# Patient Record
Sex: FEMALE | Race: WHITE | URBAN - METROPOLITAN AREA
[De-identification: names, ages, dates, MRNs, and addresses within clinical notes are randomized per-mention and may not be internally consistent; named-entity substitution may affect disease eponyms.]

---

## 2021-04-27 ENCOUNTER — INPATIENT (INPATIENT)
Facility: HOSPITAL | Age: 87
LOS: 5 days | Discharge: HOME HEALTH | DRG: 175 | End: 2021-05-03
Attending: HOSPITALIST | Admitting: FAMILY MEDICINE
Payer: MEDICARE

## 2021-04-27 VITALS
HEART RATE: 91 BPM | TEMPERATURE: 97 F | DIASTOLIC BLOOD PRESSURE: 62 MMHG | WEIGHT: 138.89 LBS | SYSTOLIC BLOOD PRESSURE: 113 MMHG | HEIGHT: 61 IN | RESPIRATION RATE: 18 BRPM | OXYGEN SATURATION: 91 %

## 2021-04-27 DIAGNOSIS — I26.99 OTHER PULMONARY EMBOLISM WITHOUT ACUTE COR PULMONALE: ICD-10-CM

## 2021-04-27 DIAGNOSIS — R77.8 OTHER SPECIFIED ABNORMALITIES OF PLASMA PROTEINS: ICD-10-CM

## 2021-04-27 LAB
ALBUMIN SERPL ELPH-MCNC: 3 G/DL — LOW (ref 3.3–5)
ALP SERPL-CCNC: 57 U/L — SIGNIFICANT CHANGE UP (ref 40–120)
ALT FLD-CCNC: 16 U/L — SIGNIFICANT CHANGE UP (ref 12–78)
ANION GAP SERPL CALC-SCNC: 8 MMOL/L — SIGNIFICANT CHANGE UP (ref 5–17)
APPEARANCE UR: CLEAR — SIGNIFICANT CHANGE UP
APTT BLD: 22.7 SEC — LOW (ref 27.5–35.5)
APTT BLD: >200 SEC — CRITICAL HIGH (ref 27.5–35.5)
AST SERPL-CCNC: 23 U/L — SIGNIFICANT CHANGE UP (ref 15–37)
BASOPHILS # BLD AUTO: 0.04 K/UL — SIGNIFICANT CHANGE UP (ref 0–0.2)
BASOPHILS NFR BLD AUTO: 0.4 % — SIGNIFICANT CHANGE UP (ref 0–2)
BILIRUB SERPL-MCNC: 0.4 MG/DL — SIGNIFICANT CHANGE UP (ref 0.2–1.2)
BILIRUB UR-MCNC: NEGATIVE — SIGNIFICANT CHANGE UP
BUN SERPL-MCNC: 31 MG/DL — HIGH (ref 7–23)
CALCIUM SERPL-MCNC: 10.1 MG/DL — SIGNIFICANT CHANGE UP (ref 8.5–10.1)
CHLORIDE SERPL-SCNC: 107 MMOL/L — SIGNIFICANT CHANGE UP (ref 96–108)
CO2 SERPL-SCNC: 27 MMOL/L — SIGNIFICANT CHANGE UP (ref 22–31)
COLOR SPEC: YELLOW — SIGNIFICANT CHANGE UP
CREAT SERPL-MCNC: 1.45 MG/DL — HIGH (ref 0.5–1.3)
DIFF PNL FLD: ABNORMAL
EOSINOPHIL # BLD AUTO: 0.13 K/UL — SIGNIFICANT CHANGE UP (ref 0–0.5)
EOSINOPHIL NFR BLD AUTO: 1.4 % — SIGNIFICANT CHANGE UP (ref 0–6)
GLUCOSE SERPL-MCNC: 105 MG/DL — HIGH (ref 70–99)
GLUCOSE UR QL: NEGATIVE MG/DL — SIGNIFICANT CHANGE UP
HCT VFR BLD CALC: 38.2 % — SIGNIFICANT CHANGE UP (ref 34.5–45)
HCT VFR BLD CALC: 39.6 % — SIGNIFICANT CHANGE UP (ref 34.5–45)
HGB BLD-MCNC: 12.5 G/DL — SIGNIFICANT CHANGE UP (ref 11.5–15.5)
HGB BLD-MCNC: 13 G/DL — SIGNIFICANT CHANGE UP (ref 11.5–15.5)
IMM GRANULOCYTES NFR BLD AUTO: 0.4 % — SIGNIFICANT CHANGE UP (ref 0–1.5)
INR BLD: 1.11 RATIO — SIGNIFICANT CHANGE UP (ref 0.88–1.16)
KETONES UR-MCNC: NEGATIVE — SIGNIFICANT CHANGE UP
LEUKOCYTE ESTERASE UR-ACNC: NEGATIVE — SIGNIFICANT CHANGE UP
LYMPHOCYTES # BLD AUTO: 3.49 K/UL — HIGH (ref 1–3.3)
LYMPHOCYTES # BLD AUTO: 37.6 % — SIGNIFICANT CHANGE UP (ref 13–44)
MAGNESIUM SERPL-MCNC: 1.8 MG/DL — SIGNIFICANT CHANGE UP (ref 1.6–2.6)
MCHC RBC-ENTMCNC: 32.5 PG — SIGNIFICANT CHANGE UP (ref 27–34)
MCHC RBC-ENTMCNC: 32.6 PG — SIGNIFICANT CHANGE UP (ref 27–34)
MCHC RBC-ENTMCNC: 32.7 GM/DL — SIGNIFICANT CHANGE UP (ref 32–36)
MCHC RBC-ENTMCNC: 32.8 GM/DL — SIGNIFICANT CHANGE UP (ref 32–36)
MCV RBC AUTO: 99 FL — SIGNIFICANT CHANGE UP (ref 80–100)
MCV RBC AUTO: 99.7 FL — SIGNIFICANT CHANGE UP (ref 80–100)
MONOCYTES # BLD AUTO: 0.72 K/UL — SIGNIFICANT CHANGE UP (ref 0–0.9)
MONOCYTES NFR BLD AUTO: 7.8 % — SIGNIFICANT CHANGE UP (ref 2–14)
NEUTROPHILS # BLD AUTO: 4.87 K/UL — SIGNIFICANT CHANGE UP (ref 1.8–7.4)
NEUTROPHILS NFR BLD AUTO: 52.4 % — SIGNIFICANT CHANGE UP (ref 43–77)
NITRITE UR-MCNC: NEGATIVE — SIGNIFICANT CHANGE UP
PH UR: 5 — SIGNIFICANT CHANGE UP (ref 5–8)
PLATELET # BLD AUTO: 124 K/UL — LOW (ref 150–400)
PLATELET # BLD AUTO: 127 K/UL — LOW (ref 150–400)
POTASSIUM SERPL-MCNC: 4.1 MMOL/L — SIGNIFICANT CHANGE UP (ref 3.5–5.3)
POTASSIUM SERPL-SCNC: 4.1 MMOL/L — SIGNIFICANT CHANGE UP (ref 3.5–5.3)
PROT SERPL-MCNC: 6.1 GM/DL — SIGNIFICANT CHANGE UP (ref 6–8.3)
PROT UR-MCNC: NEGATIVE MG/DL — SIGNIFICANT CHANGE UP
PROTHROM AB SERPL-ACNC: 12.9 SEC — SIGNIFICANT CHANGE UP (ref 10.6–13.6)
RAPID RVP RESULT: SIGNIFICANT CHANGE UP
RBC # BLD: 3.83 M/UL — SIGNIFICANT CHANGE UP (ref 3.8–5.2)
RBC # BLD: 4 M/UL — SIGNIFICANT CHANGE UP (ref 3.8–5.2)
RBC # FLD: 12.9 % — SIGNIFICANT CHANGE UP (ref 10.3–14.5)
RBC # FLD: 12.9 % — SIGNIFICANT CHANGE UP (ref 10.3–14.5)
SARS-COV-2 RNA SPEC QL NAA+PROBE: SIGNIFICANT CHANGE UP
SODIUM SERPL-SCNC: 142 MMOL/L — SIGNIFICANT CHANGE UP (ref 135–145)
SP GR SPEC: 1.01 — SIGNIFICANT CHANGE UP (ref 1.01–1.02)
TROPONIN I SERPL-MCNC: 0.13 NG/ML — HIGH (ref 0.01–0.04)
TROPONIN I SERPL-MCNC: 0.98 NG/ML — HIGH (ref 0.01–0.04)
TROPONIN I SERPL-MCNC: 1.06 NG/ML — HIGH (ref 0.01–0.04)
TROPONIN I SERPL-MCNC: 1.22 NG/ML — HIGH (ref 0.01–0.04)
TSH SERPL-MCNC: 4.32 UU/ML — SIGNIFICANT CHANGE UP (ref 0.34–4.82)
UROBILINOGEN FLD QL: NEGATIVE MG/DL — SIGNIFICANT CHANGE UP
WBC # BLD: 12.56 K/UL — HIGH (ref 3.8–10.5)
WBC # BLD: 9.29 K/UL — SIGNIFICANT CHANGE UP (ref 3.8–10.5)
WBC # FLD AUTO: 12.56 K/UL — HIGH (ref 3.8–10.5)
WBC # FLD AUTO: 9.29 K/UL — SIGNIFICANT CHANGE UP (ref 3.8–10.5)

## 2021-04-27 PROCEDURE — 99223 1ST HOSP IP/OBS HIGH 75: CPT

## 2021-04-27 PROCEDURE — 86140 C-REACTIVE PROTEIN: CPT

## 2021-04-27 PROCEDURE — 85652 RBC SED RATE AUTOMATED: CPT

## 2021-04-27 PROCEDURE — 99291 CRITICAL CARE FIRST HOUR: CPT | Mod: CS

## 2021-04-27 PROCEDURE — 85027 COMPLETE CBC AUTOMATED: CPT

## 2021-04-27 PROCEDURE — 85730 THROMBOPLASTIN TIME PARTIAL: CPT

## 2021-04-27 PROCEDURE — 97163 PT EVAL HIGH COMPLEX 45 MIN: CPT | Mod: GP

## 2021-04-27 PROCEDURE — 97110 THERAPEUTIC EXERCISES: CPT | Mod: GP

## 2021-04-27 PROCEDURE — 84484 ASSAY OF TROPONIN QUANT: CPT

## 2021-04-27 PROCEDURE — U0005: CPT

## 2021-04-27 PROCEDURE — 93970 EXTREMITY STUDY: CPT

## 2021-04-27 PROCEDURE — 85025 COMPLETE CBC W/AUTO DIFF WBC: CPT

## 2021-04-27 PROCEDURE — 73560 X-RAY EXAM OF KNEE 1 OR 2: CPT | Mod: LT

## 2021-04-27 PROCEDURE — 97116 GAIT TRAINING THERAPY: CPT | Mod: GP

## 2021-04-27 PROCEDURE — 93306 TTE W/DOPPLER COMPLETE: CPT

## 2021-04-27 PROCEDURE — 85018 HEMOGLOBIN: CPT

## 2021-04-27 PROCEDURE — 71275 CT ANGIOGRAPHY CHEST: CPT | Mod: 26

## 2021-04-27 PROCEDURE — 93005 ELECTROCARDIOGRAM TRACING: CPT

## 2021-04-27 PROCEDURE — 86769 SARS-COV-2 COVID-19 ANTIBODY: CPT

## 2021-04-27 PROCEDURE — 85014 HEMATOCRIT: CPT

## 2021-04-27 PROCEDURE — 71045 X-RAY EXAM CHEST 1 VIEW: CPT | Mod: 26

## 2021-04-27 PROCEDURE — 80048 BASIC METABOLIC PNL TOTAL CA: CPT

## 2021-04-27 PROCEDURE — 80053 COMPREHEN METABOLIC PANEL: CPT

## 2021-04-27 PROCEDURE — 36415 COLL VENOUS BLD VENIPUNCTURE: CPT

## 2021-04-27 PROCEDURE — U0003: CPT

## 2021-04-27 RX ORDER — TRAVOPROST 0.04 MG/ML
1 SOLUTION/ DROPS OPHTHALMIC
Qty: 0 | Refills: 0 | DISCHARGE

## 2021-04-27 RX ORDER — SIMVASTATIN 20 MG/1
20 TABLET, FILM COATED ORAL AT BEDTIME
Refills: 0 | Status: DISCONTINUED | OUTPATIENT
Start: 2021-04-27 | End: 2021-05-03

## 2021-04-27 RX ORDER — ACETAMINOPHEN 500 MG
650 TABLET ORAL EVERY 6 HOURS
Refills: 0 | Status: DISCONTINUED | OUTPATIENT
Start: 2021-04-27 | End: 2021-05-03

## 2021-04-27 RX ORDER — PANTOPRAZOLE SODIUM 20 MG/1
1 TABLET, DELAYED RELEASE ORAL
Qty: 0 | Refills: 0 | DISCHARGE

## 2021-04-27 RX ORDER — SIMVASTATIN 20 MG/1
1 TABLET, FILM COATED ORAL
Qty: 0 | Refills: 0 | DISCHARGE

## 2021-04-27 RX ORDER — ASPIRIN/CALCIUM CARB/MAGNESIUM 324 MG
325 TABLET ORAL ONCE
Refills: 0 | Status: COMPLETED | OUTPATIENT
Start: 2021-04-27 | End: 2021-04-27

## 2021-04-27 RX ORDER — HEPARIN SODIUM 5000 [USP'U]/ML
5000 INJECTION INTRAVENOUS; SUBCUTANEOUS EVERY 6 HOURS
Refills: 0 | Status: DISCONTINUED | OUTPATIENT
Start: 2021-04-27 | End: 2021-04-29

## 2021-04-27 RX ORDER — METOPROLOL TARTRATE 50 MG
1 TABLET ORAL
Qty: 0 | Refills: 0 | DISCHARGE

## 2021-04-27 RX ORDER — ASPIRIN/CALCIUM CARB/MAGNESIUM 324 MG
81 TABLET ORAL DAILY
Refills: 0 | Status: DISCONTINUED | OUTPATIENT
Start: 2021-04-28 | End: 2021-04-30

## 2021-04-27 RX ORDER — HEPARIN SODIUM 5000 [USP'U]/ML
5000 INJECTION INTRAVENOUS; SUBCUTANEOUS ONCE
Refills: 0 | Status: COMPLETED | OUTPATIENT
Start: 2021-04-27 | End: 2021-04-27

## 2021-04-27 RX ORDER — CHOLECALCIFEROL (VITAMIN D3) 125 MCG
1000 CAPSULE ORAL DAILY
Refills: 0 | Status: DISCONTINUED | OUTPATIENT
Start: 2021-04-27 | End: 2021-05-03

## 2021-04-27 RX ORDER — METOPROLOL TARTRATE 50 MG
25 TABLET ORAL
Refills: 0 | Status: DISCONTINUED | OUTPATIENT
Start: 2021-04-27 | End: 2021-05-03

## 2021-04-27 RX ORDER — POTASSIUM CHLORIDE 20 MEQ
10 PACKET (EA) ORAL DAILY
Refills: 0 | Status: DISCONTINUED | OUTPATIENT
Start: 2021-04-27 | End: 2021-04-28

## 2021-04-27 RX ORDER — MELOXICAM 15 MG/1
1 TABLET ORAL
Qty: 0 | Refills: 0 | DISCHARGE

## 2021-04-27 RX ORDER — HEPARIN SODIUM 5000 [USP'U]/ML
2500 INJECTION INTRAVENOUS; SUBCUTANEOUS EVERY 6 HOURS
Refills: 0 | Status: DISCONTINUED | OUTPATIENT
Start: 2021-04-27 | End: 2021-04-29

## 2021-04-27 RX ORDER — TAMOXIFEN CITRATE 20 MG/1
10 TABLET, FILM COATED ORAL
Refills: 0 | Status: DISCONTINUED | OUTPATIENT
Start: 2021-04-27 | End: 2021-04-28

## 2021-04-27 RX ORDER — POTASSIUM CHLORIDE 20 MEQ
1 PACKET (EA) ORAL
Qty: 0 | Refills: 0 | DISCHARGE

## 2021-04-27 RX ORDER — PANTOPRAZOLE SODIUM 20 MG/1
40 TABLET, DELAYED RELEASE ORAL
Refills: 0 | Status: DISCONTINUED | OUTPATIENT
Start: 2021-04-27 | End: 2021-05-03

## 2021-04-27 RX ORDER — SENNA PLUS 8.6 MG/1
2 TABLET ORAL AT BEDTIME
Refills: 0 | Status: DISCONTINUED | OUTPATIENT
Start: 2021-04-27 | End: 2021-05-03

## 2021-04-27 RX ORDER — LATANOPROST 0.05 MG/ML
1 SOLUTION/ DROPS OPHTHALMIC; TOPICAL AT BEDTIME
Refills: 0 | Status: DISCONTINUED | OUTPATIENT
Start: 2021-04-27 | End: 2021-05-03

## 2021-04-27 RX ORDER — HEPARIN SODIUM 5000 [USP'U]/ML
INJECTION INTRAVENOUS; SUBCUTANEOUS
Qty: 25000 | Refills: 0 | Status: DISCONTINUED | OUTPATIENT
Start: 2021-04-27 | End: 2021-04-29

## 2021-04-27 RX ORDER — CHOLECALCIFEROL (VITAMIN D3) 125 MCG
1 CAPSULE ORAL
Qty: 0 | Refills: 0 | DISCHARGE

## 2021-04-27 RX ADMIN — Medication 650 MILLIGRAM(S): at 19:03

## 2021-04-27 RX ADMIN — Medication 325 MILLIGRAM(S): at 11:37

## 2021-04-27 RX ADMIN — Medication 650 MILLIGRAM(S): at 18:14

## 2021-04-27 RX ADMIN — TAMOXIFEN CITRATE 10 MILLIGRAM(S): 20 TABLET, FILM COATED ORAL at 22:14

## 2021-04-27 RX ADMIN — HEPARIN SODIUM 0 UNIT(S)/HR: 5000 INJECTION INTRAVENOUS; SUBCUTANEOUS at 20:50

## 2021-04-27 RX ADMIN — LATANOPROST 1 DROP(S): 0.05 SOLUTION/ DROPS OPHTHALMIC; TOPICAL at 22:14

## 2021-04-27 RX ADMIN — HEPARIN SODIUM 5000 UNIT(S): 5000 INJECTION INTRAVENOUS; SUBCUTANEOUS at 12:01

## 2021-04-27 RX ADMIN — HEPARIN SODIUM 1100 UNIT(S)/HR: 5000 INJECTION INTRAVENOUS; SUBCUTANEOUS at 12:01

## 2021-04-27 RX ADMIN — SIMVASTATIN 20 MILLIGRAM(S): 20 TABLET, FILM COATED ORAL at 21:55

## 2021-04-27 RX ADMIN — HEPARIN SODIUM 900 UNIT(S)/HR: 5000 INJECTION INTRAVENOUS; SUBCUTANEOUS at 22:02

## 2021-04-27 NOTE — ED PROVIDER NOTE - CARE PLAN
Principal Discharge DX:	NSTEMI (non-ST elevated myocardial infarction)   Principal Discharge DX:	Pulmonary embolism

## 2021-04-27 NOTE — ED ADULT NURSE REASSESSMENT NOTE - NS ED NURSE REASSESS COMMENT FT1
Pt alert and oriented with periods of forgetfulness- pt unaware she is admitted and asking if she will need surgery. Pt reoriented. Vitals taken and stable. Heparin drip infusing at correct rate. Dinner ordered for patient. All needs addressed-call bell in reach. Bed assignment pending.

## 2021-04-27 NOTE — PHARMACOTHERAPY INTERVENTION NOTE - COMMENTS
med history complete, reviewed medications with patient and confirmed with doctor first med profile, all medications related questions answered med history complete, patient unable to provide history, reviewed with patient provided med list and confirmed with doctor first med profile, all medications related questions answered

## 2021-04-27 NOTE — H&P ADULT - HISTORY OF PRESENT ILLNESS
CC:  Patient is a 89y old  Female who presents with a chief complaint of chest pain.    HPI:  89F.  admitted 04/27/21.  presented to ED c/o CP.  sudden onset.  a/w weakness and dizzy as if she was going to pass out.  patient's son lowered her to the ground.  LOC or other seizure like activity was not observed.     in ED, patient's O2 sat 89-90% RA.  O2 via NC @ 2LPM was placed.  CTA (+) saddle PE w/ R heart strain.  [facility] was contacted and advised against TPA, start UFH gtt and admit to telemetry.  Cardiology consulted.  pulse-ox-100 on 2L    ROS:      all other review of systems are negative unless indicated above.    PAST MEDICAL & SURGICAL HISTORY:  ·	HTN.  ·	HLD.  ·	colon CA-partial colectomy w/ colostomy.  ·	ovarian CA-ORA.  ·	glaucoma.  ·	subthyroidectomy.    Allergies    No Known Drug Allergies  Originally Entered as [Rash] reaction to [adhesive tape] (Unknown)    Intolerances        Home Medications:  LORazepam 1 mg oral tablet: 1 tab(s) orally 2 times a day, As Needed (27 Apr 2021 13:15)  meloxicam 7.5 mg oral tablet: 1 tab(s) orally once a day, As Needed (27 Apr 2021 13:15)  Metoprolol Tartrate 25 mg oral tablet: 1 tab(s) orally 2 times a day (27 Apr 2021 13:15)  pantoprazole 40 mg oral delayed release tablet: 1 tab(s) orally once a day (27 Apr 2021 13:15)  potassium chloride 10 mEq oral tablet, extended release: 1 tab(s) orally 2 times a day (27 Apr 2021 13:15)  simvastatin 20 mg oral tablet: 1 tab(s) orally once a day (at bedtime) (27 Apr 2021 13:15)  tamoxifen 10 mg oral tablet: 1 tab(s) orally 2 times a day (27 Apr 2021 13:15)  Travatan Z 0.004% ophthalmic solution: 1 drop(s) in the right eye once a day (in the evening) (27 Apr 2021 13:15)  Vitamin D3 1000 intl units (25 mcg) oral tablet: 1 tab(s) orally once a day (27 Apr 2021 13:15)      Social History:  lives in the community in her own home.    FAMILY HISTORY:  no significant hx.    Vital Signs Last 24 Hrs  T(C): 36.6 (27 Apr 2021 11:07), Max: 36.6 (27 Apr 2021 11:07)  T(F): 97.8 (27 Apr 2021 11:07), Max: 97.8 (27 Apr 2021 11:07)  HR: 81 (27 Apr 2021 13:00) (81 - 91)  BP: 152/82 (27 Apr 2021 13:00) (113/62 - 163/90)  BP(mean): 107 (27 Apr 2021 12:02) (99 - 107)  RR: 22 (27 Apr 2021 13:00) (18 - 23)  SpO2: 100% (27 Apr 2021 13:00) (91% - 100%)    Constitutional: NAD.   HEENT: PERRL, EOMI, MMM.  Neck: Soft and supple, No carotid bruit, No JVD  Respiratory: Breath sounds are clear bilaterally, No wheezing, rales or rhonchi  Cardiovascular: S1 and S2, regular rate and rhythm, no murmur, rub or gallop.  Gastrointestinal: Bowel Sounds present, soft, nontender, nondistended, no guarding, no rebound, no mass.  Extremities: No peripheral edema  Vascular: 2+ peripheral pulses  Neurological: A/O x 3, no focal deficits  Musculoskeletal: 5/5 strength b/l upper and lower extremities  Skin:  no visible rashes.                             12.5   9.29  )-----------( 127      ( 27 Apr 2021 08:53 )             38.2       PT/INR - ( 27 Apr 2021 08:53 )   PT: 12.9 sec;   INR: 1.11 ratio         PTT - ( 27 Apr 2021 08:53 )  PTT:22.7 sec    04-27    142  |  107  |  31<H>  ----------------------------<  105<H>  4.1   |  27  |  1.45<H>    Ca    10.1      27 Apr 2021 08:53  Mg     1.8     04-27    TPro  6.1  /  Alb  3.0<L>  /  TBili  0.4  /  DBili  x   /  AST  23  /  ALT  16  /  AlkPhos  57  04-27      LIVER FUNCTIONS - ( 27 Apr 2021 08:53 )  Alb: 3.0 g/dL / Pro: 6.1 gm/dL / ALK PHOS: 57 U/L / ALT: 16 U/L / AST: 23 U/L / GGT: x             CARDIAC MARKERS ( 27 Apr 2021 14:34 )  1.220 ng/mL / x     / x     / x     / x      CARDIAC MARKERS ( 27 Apr 2021 11:48 )  0.980 ng/mL / x     / x     / x     / x      CARDIAC MARKERS ( 27 Apr 2021 08:53 )  0.128 ng/mL / x     / x     / x     / x        < from: CT Angio Chest PE Protocol w/ IV Cont (04.27.21 @ 11:22) >  IMPRESSION:  Massive pulmonary embolism and evidence of right heart strain.    Pleural-based opacity in the left lower lobe suspect for pulmonary infarction. CT follow-up to resolution is recommended to exclude the possibility of mas    < end of copied text >  < from: Xray Chest 1 View- PORTABLE-Urgent (Xray Chest 1 View- PORTABLE-Urgent .) (04.27.21 @ 09:12) >  IMPRESSION: No acute finding or change.    < end of copied text >  < from: 12 Lead ECG (04.27.21 @ 09:57) >    Diagnosis Line Sinus rhythm with 1st degree A-V block  Minimal voltage criteria for LVH, may be normal variant    Interference obscures  Otherwise normal limits    When compared with ECG of 27-APR-2021 08:25, Similar to prior ekg    < end of copied text >

## 2021-04-27 NOTE — ED PROVIDER NOTE - PROGRESS NOTE DETAILS
Ashia Lugo for attending Dr. Lane: Pt found to be hypoxic, 89 to 90% on RA. Placed on 2L O2 by nursing staff. Ashia Lugo for attending Dr. Lane: Spoke to cardio, Dr. Palla on call for Dr. Soto. Will come see pt. Ariana WILHELM: Patient with saddle PE, right heart strain- I spoke to [facility]- Dr. Friedman- no indication for TPA at this time; recommends admitting to [place]- anticoagulation- heparin gtt/bolus ordered; Dr. D'Amico made aware.

## 2021-04-27 NOTE — CONSULT NOTE ADULT - SUBJECTIVE AND OBJECTIVE BOX
89 y/o w/ h/o HTN, HLP  no prior hx of CAD    came to ED after feeling weak & lightheaded  then had loss of consciousness.  Seconds in duration.  Son brought her to ED. Despite prior admitting records  which describe chest pain, pt denies any chest pain on my history.  Denies dyspnea, palptiations, slightheadness.    CT in ED positive for saddle embolus w/ RV strain.  [facility] contacted, advised against TPA  & reccomended Hep gtt (possibly 2/2 advanced age?)  Trop mildly elevated at 1.4.  ECG w/ no ischemic changes.    PAST MEDICAL AND SURGICAL HISTORY:  	HTN.  ·	HLD.  ·	colon CA-partial colectomy w/ colostomy.  ·	ovarian CA-ORA.  ·	glaucoma.  ·	subthyroidectomy.        ALLERGIES:  Allergies    No Known Drug Allergies  Originally Entered as [Rash] reaction to [adhesive tape] (Unknown)Intolerances    SOCIAL HISTORY:  neg x 3      FAMILY  HISTORY:  noncontributory given age.      MEDICATIONS:  OUTPATIENT:  Home Medications:  LORazepam 1 mg oral tablet: 1 tab(s) orally 2 times a day, As Needed (27 Apr 2021 13:15)  meloxicam 7.5 mg oral tablet: 1 tab(s) orally once a day, As Needed (27 Apr 2021 13:15)  Metoprolol Tartrate 25 mg oral tablet: 1 tab(s) orally 2 times a day (27 Apr 2021 13:15)  pantoprazole 40 mg oral delayed release tablet: 1 tab(s) orally once a day (27 Apr 2021 13:15)  potassium chloride 10 mEq oral tablet, extended release: 1 tab(s) orally 2 times a day (27 Apr 2021 13:15)  simvastatin 20 mg oral tablet: 1 tab(s) orally once a day (at bedtime) (27 Apr 2021 13:15)  tamoxifen 10 mg oral tablet: 1 tab(s) orally 2 times a day (27 Apr 2021 13:15)  Travatan Z 0.004% ophthalmic solution: 1 drop(s) in the right eye once a day (in the evening) (27 Apr 2021 13:15)  Vitamin D3 1000 intl units (25 mcg) oral tablet: 1 tab(s) orally once a day (27 Apr 2021 13:15)        INPATIENT:  MEDICATIONS  (STANDING):  cholecalciferol 1000 Unit(s) Oral daily  heparin  Infusion.  Unit(s)/Hr (11 mL/Hr) IV Continuous <Continuous>  latanoprost 0.005% Ophthalmic Solution 1 Drop(s) Right EYE at bedtime  metoprolol tartrate 25 milliGRAM(s) Oral two times a day  pantoprazole    Tablet 40 milliGRAM(s) Oral before breakfast  potassium chloride    Tablet ER 10 milliEquivalent(s) Oral daily  simvastatin 20 milliGRAM(s) Oral at bedtime  tamoxifen 10 milliGRAM(s) Oral two times a day    MEDICATIONS  (PRN):  acetaminophen   Tablet .. 650 milliGRAM(s) Oral every 6 hours PRN Mild Pain (1 - 3)  heparin   Injectable 5000 Unit(s) IV Push every 6 hours PRN For aPTT less than 40  heparin   Injectable 2500 Unit(s) IV Push every 6 hours PRN For aPTT between 40 - 57  LORazepam     Tablet 1 milliGRAM(s) Oral two times a day PRN Anxiety  senna 2 Tablet(s) Oral at bedtime PRN Constipation    MEDICATIONS  (PRN):  acetaminophen   Tablet .. 650 milliGRAM(s) Oral every 6 hours PRN Mild Pain (1 - 3)  heparin   Injectable 5000 Unit(s) IV Push every 6 hours PRN For aPTT less than 40  heparin   Injectable 2500 Unit(s) IV Push every 6 hours PRN For aPTT between 40 - 57  LORazepam     Tablet 1 milliGRAM(s) Oral two times a day PRN Anxiety  senna 2 Tablet(s) Oral at bedtime PRN Constipation    REVIEW OF SYSTEMS:    CONSTITUTIONAL: No weakness, fevers or chills  EYES/ENT: No visual changes;  No vertigo or throat pain   NECK: No pain or stiffness  RESPIRATORY: No cough, wheezing, hemoptysis; No shortness of breath  CARDIOVASCULAR: No chest pain or palpitations  GASTROINTESTINAL: No abdominal or epigastric pain. No nausea, vomiting, or hematemesis; No diarrhea or constipation. No melena or hematochezia.  GENITOURINARY: No dysuria, frequency or hematuria  NEUROLOGICAL: No numbness or weakness  SKIN: No itching, burning, rashes, or lesions   All other review of systems is negative unless indicated above    Vital Signs Last 24 Hrs  T(C): 36.4 (27 Apr 2021 17:20), Max: 36.7 (27 Apr 2021 16:02)  T(F): 97.6 (27 Apr 2021 17:20), Max: 98 (27 Apr 2021 16:02)  HR: 83 (27 Apr 2021 18:00) (81 - 91)  BP: 146/68 (27 Apr 2021 18:00) (113/62 - 166/77)  BP(mean): 88 (27 Apr 2021 18:00) (88 - 107)  RR: 19 (27 Apr 2021 18:00) (18 - 23)  SpO2: 95% (27 Apr 2021 18:00) (91% - 100%)    I&O's Summary    27 Apr 2021 07:01  -  27 Apr 2021 18:42  --------------------------------------------------------  IN: 67 mL / OUT: 0 mL / NET: 67 mL        I&O's Detail    27 Apr 2021 07:01  -  27 Apr 2021 18:42  --------------------------------------------------------  IN:    Heparin Infusion: 67 mL  Total IN: 67 mL    OUT:  Total OUT: 0 mL    Total NET: 67 mL          PHYSICAL EXAM:    Constitutional: NAD, awake and alert, frail  HEENT: PERR, EOMI,  No oral cyananosis.  Neck:  supple,  No JVD  Respiratory: Breath sounds are clear bilaterally, No wheezing, rales or rhonchi  Cardiovascular: S1 and S2, regular rate and rhythm, no Murmurs, gallops or rubs  Gastrointestinal: Bowel Sounds present, soft, nontender.   Extremities: No peripheral edema. No clubbing or cyanosis.  Vascular: 2+ peripheral pulses  Neurological: A/O x 3, no focal deficits      LABS: All Labs Reviewed:                        13.0   12.56 )-----------( 124      ( 27 Apr 2021 18:05 )             39.6                         12.5   9.29  )-----------( 127      ( 27 Apr 2021 08:53 )             38.2     27 Apr 2021 08:53    142    |  107    |  31     ----------------------------<  105    4.1     |  27     |  1.45     Ca    10.1       27 Apr 2021 08:53  Mg     1.8       27 Apr 2021 08:53    TPro  6.1    /  Alb  3.0    /  TBili  0.4    /  DBili  x      /  AST  23     /  ALT  16     /  AlkPhos  57     27 Apr 2021 08:53    PT/INR - ( 27 Apr 2021 08:53 )   PT: 12.9 sec;   INR: 1.11 ratio         PTT - ( 27 Apr 2021 08:53 )  PTT:22.7 sec  CARDIAC MARKERS ( 27 Apr 2021 14:34 )  1.220 ng/mL / x     / x     / x     / x      CARDIAC MARKERS ( 27 Apr 2021 11:48 )  0.980 ng/mL / x     / x     / x     / x      CARDIAC MARKERS ( 27 Apr 2021 08:53 )  0.128 ng/mL / x     / x     / x     / x          Blood Culture:     04-27 @ 08:53  TSH: 4.32    ===============================  EKG:  NSR, no ischemic changes.  ===============================  RADIOLOGY:  < from: CT Angio Chest PE Protocol w/ IV Cont (04.27.21 @ 11:22) >  FINDINGS:    LUNGS AND AIRWAYS: Patent central airways.  Posterior left lower lobe pleural based opacity.  PLEURA: No pleural effusion.  MEDIASTINUM AND RASTA: No lymphadenopathy.  VESSELS:  *  Sagittal pulmonary embolism extending into the right interlobar pulmonary artery, lobar arteries of all lobes and multiple subsegmental and segmental vessels.  *  Atherosclerotic aortic calcifications. 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  HEART: Cardiomegaly. Right ventricular dilatation and slight posterior bowing of the interventricular septum No pericardial effusion.  CHEST WALL AND LOWER NECK: Within normal limits.  VISUALIZED UPPER ABDOMEN: Cholelithiasis.  BONES: No acute bony abnormality.        IMPRESSION:  Massive pulmonary embolism and evidence of right heart strain.    Pleural-based opacity in the left lower lobe suspect for pulmonary infarction. CT follow-up to resolution is recommended to exclude the possibility of mass.     Findings were discussed with Dr. Lane on April 27, 2021, 11:30 AM.    ===============================    Sunny Cox M.D.  Cardiology, [facility]  Cell: [phone]  Office:   [phone] ([facility] Office)  [phone] ([facility] Office)

## 2021-04-27 NOTE — ED PROVIDER NOTE - OBJECTIVE STATEMENT
89 y/o female with no significant PMHx presents to the ED c/o CP. Pt was at home with son when she started to feel weak and like she was going to pass out. Pt's son lowered her to the ground. No LOC. Pt was c/o CP after. Pt still with CP upon arrival. Denies SOB. No other complaints at this time.

## 2021-04-27 NOTE — ED ADULT NURSE NOTE - OBJECTIVE STATEMENT
Pt presents to er with complaints of mid-sternal chest pain radiating to left anterior chest wall, pt reports increased chest pain with deep inspiration and palpation  only to anterior left chest wall, pt RA pulse-ox-90, respirations unlabored and able to speak in clear/full sentences, alert to person, place, situation, disoriented to time, abd soft, denies pain upon urination, states she passed out this morning and fell backwards, denies loc, bruises use of blood thinners, stretcher in lowest position, call bell at side, will continue to monitor.

## 2021-04-27 NOTE — ED ADULT NURSE REASSESSMENT NOTE - NS ED NURSE REASSESS COMMENT FT1
RN called and updated daughter Berta-made aware pt was being transferred to SICU. Receiving floor given contact info for daughter and son. RN inquired of patient about her code status-pt noticeably forgetful and confused about question and discussion of DNR/DNI. Dr. Damico made aware.

## 2021-04-27 NOTE — ED ADULT NURSE REASSESSMENT NOTE - NS ED NURSE REASSESS COMMENT FT1
Heparin administered as per MD order as per , dose checked with Maddie BOOGIE R.N, pt still complains of chest pain at this time, hr-84 nsr on cm, pulse-ox-100 on 2L at this time, respirations unlabored, bleeding precautions maintained, will continue to monitor.

## 2021-04-27 NOTE — ED ADULT TRIAGE NOTE - CHIEF COMPLAINT QUOTE
Patient comes to ED  for near syncope at home. patient began feeling lightheaded, was lowered to ground by son, - injury. patient was reporting chest pain 4/10. EKG in progress

## 2021-04-27 NOTE — CONSULT NOTE ADULT - PROBLEM SELECTOR RECOMMENDATION 9
Massive pulmonary embolus w/ R heart strain on CT scan.  Not candidate for T-lytics or catheter based therapy.  cont. Hep gtt x 24 hrs, consider changing to eliquis tommorrow (loading dose).  Check US of LE bilat.  Check Echo.  Monitor in SICU for 48 hrs.

## 2021-04-27 NOTE — CONSULT NOTE ADULT - PROBLEM SELECTOR RECOMMENDATION 2
Mild elevation in the setting of massive PE.  No chest pain or ECG changes.  Trop elevation likely due to PE not ACS.

## 2021-04-27 NOTE — ED ADULT NURSE REASSESSMENT NOTE - NS ED NURSE REASSESS COMMENT FT1
Received report from Bailey RN @ 1000. Patient comfortable in bed, tele monitored. Patient to be admitted, awaiting orders and bed placement. Assisted patient in changing of ostomy bag, patient has no complaints at this time, will continue to monitor

## 2021-04-27 NOTE — H&P ADULT - ASSESSMENT
89F.  admitted 04/27/21.  presented to ED c/o CP.  sudden onset.      saddle PE w/ R heart strain.  - ED physician d/w [facility] and advised against transfer, thrombectomy or TPA and to start AC.  - telemetry and O2 saturation monitoring.  - Dx:  b/l LE venous dopplers, r/o DVT;  TTE to further evaluate wall motion abnormalities.  - AC:  UFH gtt.  - Pulmonology.    NSTEMI.  - telemetry monitoring.  - serial TnI and EKGs.  - AC:  UFH gtt.  - AP:  ASA 325mg po x 1, then 81mg po qd.  - BB:  metoprolol tartrate 25mg po bid.  - ST:  simvastatin 20mg po qd.  - Cardiology.    advanced directive.  - GOC discussed w/ patient.  - no limits set at current time.    disposition.  - IS.    communication.  - ED RN.  - ED physician.

## 2021-04-28 LAB
ANION GAP SERPL CALC-SCNC: 3 MMOL/L — LOW (ref 5–17)
APTT BLD: 107.3 SEC — HIGH (ref 27.5–35.5)
APTT BLD: 68.7 SEC — HIGH (ref 27.5–35.5)
APTT BLD: > 200 SEC (ref 27.5–35.5)
BASOPHILS # BLD AUTO: 0.05 K/UL — SIGNIFICANT CHANGE UP (ref 0–0.2)
BASOPHILS NFR BLD AUTO: 0.5 % — SIGNIFICANT CHANGE UP (ref 0–2)
BUN SERPL-MCNC: 29 MG/DL — HIGH (ref 7–23)
CALCIUM SERPL-MCNC: 9.5 MG/DL — SIGNIFICANT CHANGE UP (ref 8.5–10.1)
CHLORIDE SERPL-SCNC: 108 MMOL/L — SIGNIFICANT CHANGE UP (ref 96–108)
CO2 SERPL-SCNC: 29 MMOL/L — SIGNIFICANT CHANGE UP (ref 22–31)
CREAT SERPL-MCNC: 1.31 MG/DL — HIGH (ref 0.5–1.3)
CULTURE RESULTS: NO GROWTH — SIGNIFICANT CHANGE UP
EOSINOPHIL # BLD AUTO: 0.12 K/UL — SIGNIFICANT CHANGE UP (ref 0–0.5)
EOSINOPHIL NFR BLD AUTO: 1.1 % — SIGNIFICANT CHANGE UP (ref 0–6)
GLUCOSE SERPL-MCNC: 106 MG/DL — HIGH (ref 70–99)
HCT VFR BLD CALC: 38.4 % — SIGNIFICANT CHANGE UP (ref 34.5–45)
HGB BLD-MCNC: 12.7 G/DL — SIGNIFICANT CHANGE UP (ref 11.5–15.5)
IMM GRANULOCYTES NFR BLD AUTO: 0.3 % — SIGNIFICANT CHANGE UP (ref 0–1.5)
LYMPHOCYTES # BLD AUTO: 27.9 % — SIGNIFICANT CHANGE UP (ref 13–44)
LYMPHOCYTES # BLD AUTO: 3.02 K/UL — SIGNIFICANT CHANGE UP (ref 1–3.3)
MCHC RBC-ENTMCNC: 32.6 PG — SIGNIFICANT CHANGE UP (ref 27–34)
MCHC RBC-ENTMCNC: 33.1 GM/DL — SIGNIFICANT CHANGE UP (ref 32–36)
MCV RBC AUTO: 98.7 FL — SIGNIFICANT CHANGE UP (ref 80–100)
MONOCYTES # BLD AUTO: 0.82 K/UL — SIGNIFICANT CHANGE UP (ref 0–0.9)
MONOCYTES NFR BLD AUTO: 7.6 % — SIGNIFICANT CHANGE UP (ref 2–14)
NEUTROPHILS # BLD AUTO: 6.8 K/UL — SIGNIFICANT CHANGE UP (ref 1.8–7.4)
NEUTROPHILS NFR BLD AUTO: 62.6 % — SIGNIFICANT CHANGE UP (ref 43–77)
PLATELET # BLD AUTO: 116 K/UL — LOW (ref 150–400)
POTASSIUM SERPL-MCNC: 4.8 MMOL/L — SIGNIFICANT CHANGE UP (ref 3.5–5.3)
POTASSIUM SERPL-SCNC: 4.8 MMOL/L — SIGNIFICANT CHANGE UP (ref 3.5–5.3)
RBC # BLD: 3.89 M/UL — SIGNIFICANT CHANGE UP (ref 3.8–5.2)
RBC # FLD: 13.2 % — SIGNIFICANT CHANGE UP (ref 10.3–14.5)
SODIUM SERPL-SCNC: 140 MMOL/L — SIGNIFICANT CHANGE UP (ref 135–145)
SPECIMEN SOURCE: SIGNIFICANT CHANGE UP
WBC # BLD: 10.84 K/UL — HIGH (ref 3.8–10.5)
WBC # FLD AUTO: 10.84 K/UL — HIGH (ref 3.8–10.5)

## 2021-04-28 PROCEDURE — 93306 TTE W/DOPPLER COMPLETE: CPT | Mod: 26

## 2021-04-28 PROCEDURE — 99233 SBSQ HOSP IP/OBS HIGH 50: CPT

## 2021-04-28 PROCEDURE — 93010 ELECTROCARDIOGRAM REPORT: CPT

## 2021-04-28 PROCEDURE — 93970 EXTREMITY STUDY: CPT | Mod: 26

## 2021-04-28 RX ADMIN — HEPARIN SODIUM 0 UNIT(S)/HR: 5000 INJECTION INTRAVENOUS; SUBCUTANEOUS at 04:43

## 2021-04-28 RX ADMIN — PANTOPRAZOLE SODIUM 40 MILLIGRAM(S): 20 TABLET, DELAYED RELEASE ORAL at 05:54

## 2021-04-28 RX ADMIN — Medication 30 MILLILITER(S): at 08:56

## 2021-04-28 RX ADMIN — Medication 1000 UNIT(S): at 09:45

## 2021-04-28 RX ADMIN — Medication 10 MILLIEQUIVALENT(S): at 09:45

## 2021-04-28 RX ADMIN — LATANOPROST 1 DROP(S): 0.05 SOLUTION/ DROPS OPHTHALMIC; TOPICAL at 21:18

## 2021-04-28 RX ADMIN — HEPARIN SODIUM 700 UNIT(S)/HR: 5000 INJECTION INTRAVENOUS; SUBCUTANEOUS at 05:54

## 2021-04-28 RX ADMIN — TAMOXIFEN CITRATE 10 MILLIGRAM(S): 20 TABLET, FILM COATED ORAL at 09:45

## 2021-04-28 RX ADMIN — Medication 25 MILLIGRAM(S): at 09:45

## 2021-04-28 RX ADMIN — HEPARIN SODIUM 600 UNIT(S)/HR: 5000 INJECTION INTRAVENOUS; SUBCUTANEOUS at 22:00

## 2021-04-28 RX ADMIN — SIMVASTATIN 20 MILLIGRAM(S): 20 TABLET, FILM COATED ORAL at 21:18

## 2021-04-28 RX ADMIN — HEPARIN SODIUM 600 UNIT(S)/HR: 5000 INJECTION INTRAVENOUS; SUBCUTANEOUS at 13:03

## 2021-04-28 RX ADMIN — Medication 81 MILLIGRAM(S): at 09:45

## 2021-04-28 RX ADMIN — Medication 650 MILLIGRAM(S): at 17:49

## 2021-04-28 RX ADMIN — Medication 25 MILLIGRAM(S): at 21:19

## 2021-04-28 NOTE — PHARMACOTHERAPY INTERVENTION NOTE - COMMENTS
Pt at home on tamoxifen 10 mg BID. She developed DVT and massive PE started on anticoagulation. Recommend to stop tamoxifen as it carry significant risk of developing VTE while pt on therapy

## 2021-04-28 NOTE — CONSULT NOTE ADULT - SUBJECTIVE AND OBJECTIVE BOX
HPI:  CC:  Patient is a 89y old  Female who presents with a chief complaint of chest pain.    HPI:  89F.  admitted 21.  presented to ED c/o CP.  sudden onset.  a/w weakness and dizzy as if she was going to pass out.  patient's son lowered her to the ground.  LOC or other seizure like activity was not observed.     in ED, patient's O2 sat 89-90% RA.  O2 via NC @ 2LPM was placed.  CTA (+) saddle PE w/ R heart strain.  [facility] was contacted and advised against TPA, start UFH gtt and admit to telemetry.  Cardiology consulted.  pulse-ox-100 on 2L    ROS:      all other review of systems are negative unless indicated above.    PAST MEDICAL & SURGICAL HISTORY:  ·	HTN.  ·	HLD.  ·	colon CA-partial colectomy w/ colostomy.  ·	ovarian CA-ORA.  ·	glaucoma.  ·	subthyroidectomy.    Allergies    No Known Drug Allergies  Originally Entered as [Rash] reaction to [adhesive tape] (Unknown)    Intolerances        Home Medications:  LORazepam 1 mg oral tablet: 1 tab(s) orally 2 times a day, As Needed (2021 13:15)  meloxicam 7.5 mg oral tablet: 1 tab(s) orally once a day, As Needed (2021 13:15)  Metoprolol Tartrate 25 mg oral tablet: 1 tab(s) orally 2 times a day (2021 13:15)  pantoprazole 40 mg oral delayed release tablet: 1 tab(s) orally once a day (2021 13:15)  potassium chloride 10 mEq oral tablet, extended release: 1 tab(s) orally 2 times a day (2021 13:15)  simvastatin 20 mg oral tablet: 1 tab(s) orally once a day (at bedtime) (2021 13:15)  tamoxifen 10 mg oral tablet: 1 tab(s) orally 2 times a day (2021 13:15)  Travatan Z 0.004% ophthalmic solution: 1 drop(s) in the right eye once a day (in the evening) (2021 13:15)  Vitamin D3 1000 intl units (25 mcg) oral tablet: 1 tab(s) orally once a day (2021 13:15)      Social History:  lives in the community in her own home.    FAMILY HISTORY:  no significant hx.    Vital Signs Last 24 Hrs  T(C): 36.6 (2021 11:07), Max: 36.6 (2021 11:07)  T(F): 97.8 (2021 11:07), Max: 97.8 (2021 11:07)  HR: 81 (2021 13:00) (81 - 91)  BP: 152/82 (2021 13:00) (113/62 - 163/90)  BP(mean): 107 (2021 12:02) (99 - 107)  RR: 22 (2021 13:00) (18 - 23)  SpO2: 100% (2021 13:00) (91% - 100%)    Constitutional: NAD.   HEENT: PERRL, EOMI, MMM.  Neck: Soft and supple, No carotid bruit, No JVD  Respiratory: Breath sounds are clear bilaterally, No wheezing, rales or rhonchi  Cardiovascular: S1 and S2, regular rate and rhythm, no murmur, rub or gallop.  Gastrointestinal: Bowel Sounds present, soft, nontender, nondistended, no guarding, no rebound, no mass.  Extremities: No peripheral edema  Vascular: 2+ peripheral pulses  Neurological: A/O x 3, no focal deficits  Musculoskeletal: 5/5 strength b/l upper and lower extremities  Skin:  no visible rashes.                             12.5   9.29  )-----------( 127      ( 2021 08:53 )             38.2       PT/INR - ( 2021 08:53 )   PT: 12.9 sec;   INR: 1.11 ratio         PTT - ( 2021 08:53 )  PTT:22.7 sec        142  |  107  |  31<H>  ----------------------------<  105<H>  4.1   |  27  |  1.45<H>    Ca    10.1      2021 08:53  Mg     1.8         TPro  6.1  /  Alb  3.0<L>  /  TBili  0.4  /  DBili  x   /  AST  23  /  ALT  16  /  AlkPhos  57        LIVER FUNCTIONS - ( 2021 08:53 )  Alb: 3.0 g/dL / Pro: 6.1 gm/dL / ALK PHOS: 57 U/L / ALT: 16 U/L / AST: 23 U/L / GGT: x             CARDIAC MARKERS ( 2021 14:34 )  1.220 ng/mL / x     / x     / x     / x      CARDIAC MARKERS ( 2021 11:48 )  0.980 ng/mL / x     / x     / x     / x      CARDIAC MARKERS ( 2021 08:53 )  0.128 ng/mL / x     / x     / x     / x        < from: CT Angio Chest PE Protocol w/ IV Cont (21 @ 11:22) >  IMPRESSION:  Massive pulmonary embolism and evidence of right heart strain.    Pleural-based opacity in the left lower lobe suspect for pulmonary infarction. CT follow-up to resolution is recommended to exclude the possibility of mas    < end of copied text >  < from: Xray Chest 1 View- PORTABLE-Urgent (Xray Chest 1 View- PORTABLE-Urgent .) (21 @ 09:12) >  IMPRESSION: No acute finding or change.    < end of copied text >  < from: 12 Lead ECG (21 @ 09:57) >    Diagnosis Line Sinus rhythm with 1st degree A-V block  Minimal voltage criteria for LVH, may be normal variant    Interference obscures  Otherwise normal limits    When compared with ECG of 2021 08:25, Similar to prior ekg    < end of copied text >       (2021 15:36)      PAST MEDICAL & SURGICAL HISTORY:  No pertinent past medical history        Home Medications:  LORazepam 1 mg oral tablet: 1 tab(s) orally 2 times a day, As Needed (2021 13:15)  meloxicam 7.5 mg oral tablet: 1 tab(s) orally once a day, As Needed (2021 13:15)  Metoprolol Tartrate 25 mg oral tablet: 1 tab(s) orally 2 times a day (2021 13:15)  pantoprazole 40 mg oral delayed release tablet: 1 tab(s) orally once a day (2021 13:15)  potassium chloride 10 mEq oral tablet, extended release: 1 tab(s) orally 2 times a day (2021 13:15)  simvastatin 20 mg oral tablet: 1 tab(s) orally once a day (at bedtime) (2021 13:15)  tamoxifen 10 mg oral tablet: 1 tab(s) orally 2 times a day (2021 13:15)  Travatan Z 0.004% ophthalmic solution: 1 drop(s) in the right eye once a day (in the evening) (2021 13:15)  Vitamin D3 1000 intl units (25 mcg) oral tablet: 1 tab(s) orally once a day (2021 13:15)      MEDICATIONS  (STANDING):  aluminum hydroxide/magnesium hydroxide/simethicone Suspension 30 milliLiter(s) Oral once  aspirin  chewable 81 milliGRAM(s) Oral daily  cholecalciferol 1000 Unit(s) Oral daily  heparin  Infusion.  Unit(s)/Hr (11 mL/Hr) IV Continuous <Continuous>  latanoprost 0.005% Ophthalmic Solution 1 Drop(s) Right EYE at bedtime  metoprolol tartrate 25 milliGRAM(s) Oral two times a day  pantoprazole    Tablet 40 milliGRAM(s) Oral before breakfast  potassium chloride    Tablet ER 10 milliEquivalent(s) Oral daily  simvastatin 20 milliGRAM(s) Oral at bedtime  tamoxifen 10 milliGRAM(s) Oral two times a day    MEDICATIONS  (PRN):  acetaminophen   Tablet .. 650 milliGRAM(s) Oral every 6 hours PRN Mild Pain (1 - 3)  heparin   Injectable 5000 Unit(s) IV Push every 6 hours PRN For aPTT less than 40  heparin   Injectable 2500 Unit(s) IV Push every 6 hours PRN For aPTT between 40 - 57  LORazepam     Tablet 1 milliGRAM(s) Oral two times a day PRN Anxiety  senna 2 Tablet(s) Oral at bedtime PRN Constipation      Allergies    No Known Drug Allergies  Originally Entered as [Rash] reaction to [adhesive tape] (Unknown)    Intolerances        SOCIAL HISTORY: Denies tobacco, etoh abuse or illicit drug use    FAMILY HISTORY:      Vital Signs Last 24 Hrs  T(C): 36.4 (2021 05:47), Max: 36.7 (2021 16:02)  T(F): 97.6 (2021 05:47), Max: 98 (2021 16:02)  HR: 72 (2021 06:00) (65 - 90)  BP: 125/70 (2021 06:00) (95/53 - 166/77)  BP(mean): 84 (2021 06:00) (64 - 107)  RR: 16 (2021 06:00) (16 - 26)  SpO2: 99% (2021 06:00) (93% - 100%)        REVIEW OF SYSTEMS:    CONSTITUTIONAL:  As per HPI.  HEENT:  Eyes:  No diplopia or blurred vision. ENT:  No earache, sore throat or runny nose.  CARDIOVASCULAR:  No pressure, squeezing, tightness, heaviness or aching about the chest, neck, axilla or epigastrium.  RESPIRATORY:  No cough, shortness of breath, PND or orthopnea.  GASTROINTESTINAL:  No nausea, vomiting or diarrhea.  GENITOURINARY:  No dysuria, frequency or urgency.  MUSCULOSKELETAL:  As per HPI.  SKIN:  No change in skin, hair or nails.  NEUROLOGIC:  No paresthesias, fasciculations, seizures or weakness.  PSYCHIATRIC:  No disorder of thought or mood.  ENDOCRINE:  No heat or cold intolerance, polyuria or polydipsia.  HEMATOLOGICAL:  No easy bruising or bleedings:  .     PHYSICAL EXAMINATION:    GENERAL APPEARANCE:  Pt. is not currently dyspneic, in no distress. Pt. is alert, oriented, and pleasant.  HEENT:  Pupils are normal and react normally. No icterus. Mucous membranes well colored.  NECK:  Supple. No lymphadenopathy. Jugular venous pressure not elevated. Carotids equal.   HEART:   The cardiac impulse has a normal quality. Regular. Normal S1 and S2. There are no murmurs, rubs or gallops noted  CHEST:  Chest is clear to auscultation. Normal respiratory effort.  ABDOMEN:  Soft and nontender.   EXTREMITIES:  There is no cyanosis, clubbing or edema.   SKIN:  No rash or significant lesions are noted.    LABS:                        12.7   10.84 )-----------( 116      ( 2021 04:00 )             38.4     04-28    140  |  108  |  29<H>  ----------------------------<  106<H>  4.8   |  29  |  1.31<H>    Ca    9.5      2021 04:00  Mg     1.8     04-    TPro  6.1  /  Alb  3.0<L>  /  TBili  0.4  /  DBili  x   /  AST  23  /  ALT  16  /  AlkPhos  57      LIVER FUNCTIONS - ( 2021 08:53 )  Alb: 3.0 g/dL / Pro: 6.1 gm/dL / ALK PHOS: 57 U/L / ALT: 16 U/L / AST: 23 U/L / GGT: x           PT/INR - ( 2021 08:53 )   PT: 12.9 sec;   INR: 1.11 ratio         PTT - ( 2021 04:00 )  PTT:> 200 sec  CARDIAC MARKERS ( 2021 18:05 )  1.060 ng/mL / x     / x     / x     / x      CARDIAC MARKERS ( 2021 14:34 )  1.220 ng/mL / x     / x     / x     / x      CARDIAC MARKERS ( 2021 11:48 )  0.980 ng/mL / x     / x     / x     / x      CARDIAC MARKERS ( 2021 08:53 )  0.128 ng/mL / x     / x     / x     / x          Urinalysis Basic - ( 2021 08:53 )    Color: Yellow / Appearance: Clear / S.015 / pH: x  Gluc: x / Ketone: Negative  / Bili: Negative / Urobili: Negative mg/dL   Blood: x / Protein: Negative mg/dL / Nitrite: Negative   Leuk Esterase: Negative / RBC: 3-5 /HPF / WBC 0-2   Sq Epi: x / Non Sq Epi: Occasional / Bacteria: Few            RADIOLOGY & ADDITIONAL STUDIES:     CT Angio Chest PE Protocol w/ IV Cont (21 @ 11:22) >  IMPRESSION:  Massive pulmonary embolism and evidence of right heart strain.    Pleural-based opacity in the left lower lobe suspect for pulmonary infarction. CT follow-up to resolution is recommended to exclude the possibility of mass.           HPI:    89 y.o.f 21 admitted with sudden onset  weakness and dizzy as if she was going to pass out.  patient's son lowered her to the ground.  No LOC or other seizure like activity, In ED, patient's O2 sat 89-90% RA.  O2 via NC @ 2LPM was placed.  CTA (+) saddle PE w/ R heart strain.  [facility] was contacted and advised against TPA, start UFH gtt and admit to telemetry.  Cardiology consulted.  pulse-ox-100 on 2L, pat seen for pulmonary eval.    ROS:      all other review of systems are negative unless indicated above.    PAST MEDICAL & SURGICAL HISTORY:  ·	HTN.  ·	HLD.  ·	colon CA-partial colectomy w/ colostomy.  ·	ovarian CA-ORA.  ·	glaucoma.  ·	subthyroidectomy.    Allergies    No Known Drug Allergies  Originally Entered as [Rash] reaction to [adhesive tape] (Unknown)    Intolerances        Home Medications:  LORazepam 1 mg oral tablet: 1 tab(s) orally 2 times a day, As Needed (2021 13:15)  meloxicam 7.5 mg oral tablet: 1 tab(s) orally once a day, As Needed (2021 13:15)  Metoprolol Tartrate 25 mg oral tablet: 1 tab(s) orally 2 times a day (2021 13:15)  pantoprazole 40 mg oral delayed release tablet: 1 tab(s) orally once a day (2021 13:15)  potassium chloride 10 mEq oral tablet, extended release: 1 tab(s) orally 2 times a day (2021 13:15)  simvastatin 20 mg oral tablet: 1 tab(s) orally once a day (at bedtime) (2021 13:15)  tamoxifen 10 mg oral tablet: 1 tab(s) orally 2 times a day (2021 13:15)  Travatan Z 0.004% ophthalmic solution: 1 drop(s) in the right eye once a day (in the evening) (2021 13:15)  Vitamin D3 1000 intl units (25 mcg) oral tablet: 1 tab(s) orally once a day (2021 13:15)      Social History:  lives in the community in her own home.    FAMILY HISTORY:  no significant hx.    Vital Signs Last 24 Hrs  T(C): 36.6 (2021 11:07), Max: 36.6 (2021 11:07)  T(F): 97.8 (2021 11:07), Max: 97.8 (2021 11:07)  HR: 81 (2021 13:00) (81 - 91)  BP: 152/82 (2021 13:00) (113/62 - 163/90)  BP(mean): 107 (2021 12:02) (99 - 107)  RR: 22 (2021 13:00) (18 - 23)  SpO2: 100% (2021 13:00) (91% - 100%)    Constitutional: NAD.   HEENT: PERRL, EOMI, MMM.  Neck: Soft and supple, No carotid bruit, No JVD  Respiratory: Breath sounds are clear bilaterally, No wheezing, rales or rhonchi  Cardiovascular: S1 and S2, regular rate and rhythm, no murmur, rub or gallop.  Gastrointestinal: Bowel Sounds present, soft, nontender, nondistended, no guarding, no rebound, no mass.  Extremities: No peripheral edema  Vascular: 2+ peripheral pulses  Neurological: A/O x 3, no focal deficits  Musculoskeletal: 5/5 strength b/l upper and lower extremities  Skin:  no visible rashes.                             12.5   9.29  )-----------( 127      ( 2021 08:53 )             38.2       PT/INR - ( 2021 08:53 )   PT: 12.9 sec;   INR: 1.11 ratio         PTT - ( 2021 08:53 )  PTT:22.7 sec        142  |  107  |  31<H>  ----------------------------<  105<H>  4.1   |  27  |  1.45<H>    Ca    10.1      2021 08:53  Mg     1.8         TPro  6.1  /  Alb  3.0<L>  /  TBili  0.4  /  DBili  x   /  AST  23  /  ALT  16  /  AlkPhos  57        LIVER FUNCTIONS - ( 2021 08:53 )  Alb: 3.0 g/dL / Pro: 6.1 gm/dL / ALK PHOS: 57 U/L / ALT: 16 U/L / AST: 23 U/L / GGT: x             CARDIAC MARKERS ( 2021 14:34 )  1.220 ng/mL / x     / x     / x     / x      CARDIAC MARKERS ( 2021 11:48 )  0.980 ng/mL / x     / x     / x     / x      CARDIAC MARKERS ( 2021 08:53 )  0.128 ng/mL / x     / x     / x     / x        < from: CT Angio Chest PE Protocol w/ IV Cont (21 @ 11:22) >  IMPRESSION:  Massive pulmonary embolism and evidence of right heart strain.    Pleural-based opacity in the left lower lobe suspect for pulmonary infarction. CT follow-up to resolution is recommended to exclude the possibility of mas    < end of copied text >  < from: Xray Chest 1 View- PORTABLE-Urgent (Xray Chest 1 View- PORTABLE-Urgent .) (21 @ 09:12) >  IMPRESSION: No acute finding or change.    < end of copied text >  < from: 12 Lead ECG (21 @ 09:57) >    Diagnosis Line Sinus rhythm with 1st degree A-V block  Minimal voltage criteria for LVH, may be normal variant    Interference obscures  Otherwise normal limits    When compared with ECG of 2021 08:25, Similar to prior ekg    < end of copied text >       (2021 15:36)      PAST MEDICAL & SURGICAL HISTORY:  No pertinent past medical history        Home Medications:  LORazepam 1 mg oral tablet: 1 tab(s) orally 2 times a day, As Needed (2021 13:15)  meloxicam 7.5 mg oral tablet: 1 tab(s) orally once a day, As Needed (2021 13:15)  Metoprolol Tartrate 25 mg oral tablet: 1 tab(s) orally 2 times a day (2021 13:15)  pantoprazole 40 mg oral delayed release tablet: 1 tab(s) orally once a day (2021 13:15)  potassium chloride 10 mEq oral tablet, extended release: 1 tab(s) orally 2 times a day (2021 13:15)  simvastatin 20 mg oral tablet: 1 tab(s) orally once a day (at bedtime) (2021 13:15)  tamoxifen 10 mg oral tablet: 1 tab(s) orally 2 times a day (2021 13:15)  Travatan Z 0.004% ophthalmic solution: 1 drop(s) in the right eye once a day (in the evening) (2021 13:15)  Vitamin D3 1000 intl units (25 mcg) oral tablet: 1 tab(s) orally once a day (2021 13:15)      MEDICATIONS  (STANDING):  aluminum hydroxide/magnesium hydroxide/simethicone Suspension 30 milliLiter(s) Oral once  aspirin  chewable 81 milliGRAM(s) Oral daily  cholecalciferol 1000 Unit(s) Oral daily  heparin  Infusion.  Unit(s)/Hr (11 mL/Hr) IV Continuous <Continuous>  latanoprost 0.005% Ophthalmic Solution 1 Drop(s) Right EYE at bedtime  metoprolol tartrate 25 milliGRAM(s) Oral two times a day  pantoprazole    Tablet 40 milliGRAM(s) Oral before breakfast  potassium chloride    Tablet ER 10 milliEquivalent(s) Oral daily  simvastatin 20 milliGRAM(s) Oral at bedtime  tamoxifen 10 milliGRAM(s) Oral two times a day    MEDICATIONS  (PRN):  acetaminophen   Tablet .. 650 milliGRAM(s) Oral every 6 hours PRN Mild Pain (1 - 3)  heparin   Injectable 5000 Unit(s) IV Push every 6 hours PRN For aPTT less than 40  heparin   Injectable 2500 Unit(s) IV Push every 6 hours PRN For aPTT between 40 - 57  LORazepam     Tablet 1 milliGRAM(s) Oral two times a day PRN Anxiety  senna 2 Tablet(s) Oral at bedtime PRN Constipation      Allergies    No Known Drug Allergies  Originally Entered as [Rash] reaction to [adhesive tape] (Unknown)    Intolerances        SOCIAL HISTORY: Denies tobacco, etoh abuse or illicit drug use    FAMILY HISTORY:      Vital Signs Last 24 Hrs  T(C): 36.4 (2021 05:47), Max: 36.7 (2021 16:02)  T(F): 97.6 (2021 05:47), Max: 98 (2021 16:02)  HR: 72 (2021 06:00) (65 - 90)  BP: 125/70 (2021 06:00) (95/53 - 166/77)  BP(mean): 84 (2021 06:00) (64 - 107)  RR: 16 (2021 06:00) (16 - 26)  SpO2: 99% (2021 06:00) (93% - 100%)        REVIEW OF SYSTEMS:    CONSTITUTIONAL:  As per HPI.  HEENT:  Eyes:  No diplopia or blurred vision. ENT:  No earache, sore throat or runny nose.  CARDIOVASCULAR:  No pressure, squeezing, tightness, heaviness or aching about the chest, neck, axilla or epigastrium.  RESPIRATORY:  No cough, shortness of breath, PND or orthopnea.  GASTROINTESTINAL:  No nausea, vomiting or diarrhea.  GENITOURINARY:  No dysuria, frequency or urgency.  MUSCULOSKELETAL:  As per HPI.  SKIN:  No change in skin, hair or nails.  NEUROLOGIC:  No paresthesias, fasciculations, seizures or weakness.  PSYCHIATRIC:  No disorder of thought or mood.  ENDOCRINE:  No heat or cold intolerance, polyuria or polydipsia.  HEMATOLOGICAL:  No easy bruising or bleedings:  .     PHYSICAL EXAMINATION:    GENERAL APPEARANCE:  Pt. is not currently dyspneic, in no distress. Pt. is alert, oriented, and pleasant.  HEENT:  Pupils are normal and react normally. No icterus. Mucous membranes well colored.  NECK:  Supple. No lymphadenopathy. Jugular venous pressure not elevated. Carotids equal.   HEART:   The cardiac impulse has a normal quality. Regular. Normal S1 and S2. There are no murmurs, rubs or gallops noted  CHEST:  Chest is clear to auscultation. Normal respiratory effort.  ABDOMEN:  Soft and nontender.   EXTREMITIES:  There is no cyanosis, clubbing or edema.   SKIN:  No rash or significant lesions are noted.    LABS:                        12.7   10.84 )-----------( 116      ( 2021 04:00 )             38.4     04-28    140  |  108  |  29<H>  ----------------------------<  106<H>  4.8   |  29  |  1.31<H>    Ca    9.5      2021 04:00  Mg     1.8     04-27    TPro  6.1  /  Alb  3.0<L>  /  TBili  0.4  /  DBili  x   /  AST  23  /  ALT  16  /  AlkPhos  57  04-27    LIVER FUNCTIONS - ( 2021 08:53 )  Alb: 3.0 g/dL / Pro: 6.1 gm/dL / ALK PHOS: 57 U/L / ALT: 16 U/L / AST: 23 U/L / GGT: x           PT/INR - ( 2021 08:53 )   PT: 12.9 sec;   INR: 1.11 ratio         PTT - ( 2021 04:00 )  PTT:> 200 sec  CARDIAC MARKERS ( 2021 18:05 )  1.060 ng/mL / x     / x     / x     / x      CARDIAC MARKERS ( 2021 14:34 )  1.220 ng/mL / x     / x     / x     / x      CARDIAC MARKERS ( 2021 11:48 )  0.980 ng/mL / x     / x     / x     / x      CARDIAC MARKERS ( 2021 08:53 )  0.128 ng/mL / x     / x     / x     / x          Urinalysis Basic - ( 2021 08:53 )    Color: Yellow / Appearance: Clear / S.015 / pH: x  Gluc: x / Ketone: Negative  / Bili: Negative / Urobili: Negative mg/dL   Blood: x / Protein: Negative mg/dL / Nitrite: Negative   Leuk Esterase: Negative / RBC: 3-5 /HPF / WBC 0-2   Sq Epi: x / Non Sq Epi: Occasional / Bacteria: Few            RADIOLOGY & ADDITIONAL STUDIES:     CT Angio Chest PE Protocol w/ IV Cont (21 @ 11:22) >  IMPRESSION:  Massive pulmonary embolism and evidence of right heart strain.    Pleural-based opacity in the left lower lobe suspect for pulmonary infarction. CT follow-up to resolution is recommended to exclude the possibility of mass.

## 2021-04-28 NOTE — CONSULT NOTE ADULT - ASSESSMENT
89F.  admitted 04/27/21.  presented to ED c/o CP.  sudden onset.      PROBLEMS;    Saddle PE w/ R heart strain  NSTEMI.     PLAN:    d/w [facility] and advised against transfer, thrombectomy or TPA and to start AC.  Telemetry and O2 saturation monitoring.  HEPARIN-UFH gtt  SUPPORTIVE CARE     89F.  admitted 04/27/21.  presented to ED c/o CP.  sudden onset.      PROBLEMS;    Saddle PE w/ R heart strain  NSTEMI.     PLAN:    d/w [facility] and advised against transfer, thrombectomy or TPA and to start AC.  Telemetry and O2 saturation monitoring.  HEPARIN-UFH gtt  SUPPORTIVE CARE  D/W staff

## 2021-04-28 NOTE — PROGRESS NOTE ADULT - SUBJECTIVE AND OBJECTIVE BOX
89 y/o female with no significant PMHx presents to the ED c/o CP. Pt was at home with son when she started to feel weak and like she was going to pass out. Pt's son lowered her to the ground. No LOC. Pt was c/o CP after. Pt still with CP upon arrival. Denies SOB. No other complaints at this time. Adm for saddle PE not a candidate for tpa; no c/o this am    Vital Signs Last 24 Hrs  T(C): 36.4 (28 Apr 2021 05:47), Max: 36.7 (27 Apr 2021 16:02)  T(F): 97.6 (28 Apr 2021 05:47), Max: 98 (27 Apr 2021 16:02)  HR: 65 (28 Apr 2021 12:00) (65 - 90)  BP: 108/66 (28 Apr 2021 12:00) (95/53 - 166/77)  BP(mean): 77 (28 Apr 2021 12:00) (64 - 104)  RR: 16 (28 Apr 2021 12:00) (16 - 26)  SpO2: 93% (28 Apr 2021 12:00) (93% - 100%)      Constitutional: NAD, awake and alert, frail  HEENT: PERR, EOMI,  No oral cyananosis.  Neck:  supple,  No JVD  Respiratory: Breath sounds are clear bilaterally, No wheezing, rales or rhonchi  Cardiovascular: S1 and S2, regular rate and rhythm, no Murmurs, gallops or rubs  Gastrointestinal: Bowel Sounds present, soft, nontender.   Extremities: No peripheral edema. No clubbing or cyanosis.  Vascular: 2+ peripheral pulses  Neurological: A/O x 3, no focal deficits                          12.7   10.84 )-----------( 116      ( 28 Apr 2021 04:00 )             38.4   04-28    140  |  108  |  29<H>  ----------------------------<  106<H>  4.8   |  29  |  1.31<H>    Ca    9.5      28 Apr 2021 04:00  Mg     1.8     04-27    TPro  6.1  /  Alb  3.0<L>  /  TBili  0.4  /  DBili  x   /  AST  23  /  ALT  16  /  AlkPhos  57  04-27      * saddle PE w/ R heart strain.  stable  change to LMWH  dvt LLE noted not a candidate for IVC- pt is stable I believe just AC should be enough, I will d/w IR    advanced directive.  DNR DNI        89 y/o female with no significant PMHx presents to the ED c/o CP. Pt was at home with son when she started to feel weak and like she was going to pass out. Pt's son lowered her to the ground. No LOC. Pt was c/o CP after. Pt still with CP upon arrival. Denies SOB. No other complaints at this time. Adm for saddle PE not a candidate for tpa; no c/o this am    Vital Signs Last 24 Hrs  T(C): 36.4 (28 Apr 2021 05:47), Max: 36.7 (27 Apr 2021 16:02)  T(F): 97.6 (28 Apr 2021 05:47), Max: 98 (27 Apr 2021 16:02)  HR: 65 (28 Apr 2021 12:00) (65 - 90)  BP: 108/66 (28 Apr 2021 12:00) (95/53 - 166/77)  BP(mean): 77 (28 Apr 2021 12:00) (64 - 104)  RR: 16 (28 Apr 2021 12:00) (16 - 26)  SpO2: 93% (28 Apr 2021 12:00) (93% - 100%)      Constitutional: NAD, awake and alert, frail  HEENT: PERR, EOMI,  No oral cyananosis.  Neck:  supple,  No JVD  Respiratory: Breath sounds are clear bilaterally, No wheezing, rales or rhonchi  Cardiovascular: S1 and S2, regular rate and rhythm, no Murmurs, gallops or rubs  Gastrointestinal: Bowel Sounds present, soft, nontender.   Extremities: No peripheral edema. No clubbing or cyanosis.  Vascular: 2+ peripheral pulses  Neurological: A/O x 3, no focal deficits                          12.7   10.84 )-----------( 116      ( 28 Apr 2021 04:00 )             38.4   04-28    140  |  108  |  29<H>  ----------------------------<  106<H>  4.8   |  29  |  1.31<H>    Ca    9.5      28 Apr 2021 04:00  Mg     1.8     04-27    TPro  6.1  /  Alb  3.0<L>  /  TBili  0.4  /  DBili  x   /  AST  23  /  ALT  16  /  AlkPhos  57  04-27      * saddle PE w/ R heart strain.  stable  c/w UFH borderline creatinine  dvt LLE noted not a candidate for IVC- pt is stable I believe just AC should be enough, I will d/w IR    advanced directive.  DNR DNI

## 2021-04-28 NOTE — PROGRESS NOTE ADULT - SUBJECTIVE AND OBJECTIVE BOX
PCP:    REQUESTING PHYSICIAN:    REASON FOR CONSULT:    CHIEF COMPLAINT:    HPI:  89 y/o w/ h/o HTN, HLP  no prior hx of CAD    came to ED after feeling weak & lightheaded  then had loss of consciousness.  Seconds in duration.  Son brought her to ED. Despite prior admitting records  which describe chest pain, pt denies any chest pain on my history.  Denies dyspnea, palptiations, slightheadness.    CT in ED positive for saddle embolus w/ RV strain.  [facility] contacted, advised against TPA  & reccomended Hep gtt (possibly 2/2 advanced age?)  Trop mildly elevated at 1.4.  ECG w/ no ischemic changes.  4/28/21: Pt awake and alert. No chest pain. She feels improved.             PAST MEDICAL & SURGICAL HISTORY:  No pertinent past medical history        SOCIAL HISTORY:    FAMILY HISTORY:      ALLERGIES:  Allergies    No Known Drug Allergies  Originally Entered as [Rash] reaction to [adhesive tape] (Unknown)    Intolerances        MEDICATIONS:    MEDICATIONS  (STANDING):  aspirin  chewable 81 milliGRAM(s) Oral daily  cholecalciferol 1000 Unit(s) Oral daily  heparin  Infusion.  Unit(s)/Hr (11 mL/Hr) IV Continuous <Continuous>  latanoprost 0.005% Ophthalmic Solution 1 Drop(s) Right EYE at bedtime  metoprolol tartrate 25 milliGRAM(s) Oral two times a day  pantoprazole    Tablet 40 milliGRAM(s) Oral before breakfast  simvastatin 20 milliGRAM(s) Oral at bedtime    MEDICATIONS  (PRN):  acetaminophen   Tablet .. 650 milliGRAM(s) Oral every 6 hours PRN Mild Pain (1 - 3)  heparin   Injectable 5000 Unit(s) IV Push every 6 hours PRN For aPTT less than 40  heparin   Injectable 2500 Unit(s) IV Push every 6 hours PRN For aPTT between 40 - 57  LORazepam     Tablet 1 milliGRAM(s) Oral two times a day PRN Anxiety  senna 2 Tablet(s) Oral at bedtime PRN Constipation      Vital Signs Last 24 Hrs  T(C): 37 (28 Apr 2021 12:18), Max: 37 (28 Apr 2021 12:18)  T(F): 98.6 (28 Apr 2021 12:18), Max: 98.6 (28 Apr 2021 12:18)  HR: 67 (28 Apr 2021 14:00) (65 - 90)  BP: 102/61 (28 Apr 2021 14:00) (95/53 - 166/77)  BP(mean): 71 (28 Apr 2021 14:00) (64 - 104)  RR: 24 (28 Apr 2021 14:00) (16 - 26)  SpO2: 96% (28 Apr 2021 14:00) (93% - 100%)Daily     Daily I&O's Summary    27 Apr 2021 07:01  -  28 Apr 2021 07:00  --------------------------------------------------------  IN: 189.9 mL / OUT: 875 mL / NET: -685.1 mL    28 Apr 2021 07:01  -  28 Apr 2021 15:52  --------------------------------------------------------  IN: 0 mL / OUT: 250 mL / NET: -250 mL        PHYSICAL EXAM:    Constitutional: NAD, awake and alert, well-developed  HEENT: PERR, EOMI,  No oral cyananosis.  Neck:  supple,  No JVD  Respiratory: Breath sounds are clear bilaterally, No wheezing, rales or rhonchi  Cardiovascular: S1 and S2, regular rate and rhythm, no Murmurs, gallops or rubs  Gastrointestinal: Bowel Sounds present, soft, nontender.   Extremities: No peripheral edema. No clubbing or cyanosis.  Vascular: 2+ peripheral pulses  Neurological: A/O x 3, no focal deficits  Musculoskeletal: no calf tenderness.  Skin: No rashes.      LABS: All Labs Reviewed:                        12.7   10.84 )-----------( 116      ( 28 Apr 2021 04:00 )             38.4                         13.0   12.56 )-----------( 124      ( 27 Apr 2021 18:05 )             39.6                         12.5   9.29  )-----------( 127      ( 27 Apr 2021 08:53 )             38.2     28 Apr 2021 04:00    140    |  108    |  29     ----------------------------<  106    4.8     |  29     |  1.31   27 Apr 2021 08:53    142    |  107    |  31     ----------------------------<  105    4.1     |  27     |  1.45     Ca    9.5        28 Apr 2021 04:00  Ca    10.1       27 Apr 2021 08:53  Mg     1.8       27 Apr 2021 08:53    TPro  6.1    /  Alb  3.0    /  TBili  0.4    /  DBili  x      /  AST  23     /  ALT  16     /  AlkPhos  57     27 Apr 2021 08:53    PT/INR - ( 27 Apr 2021 08:53 )   PT: 12.9 sec;   INR: 1.11 ratio         PTT - ( 28 Apr 2021 12:05 )  PTT:107.3 sec  CARDIAC MARKERS ( 27 Apr 2021 18:05 )  1.060 ng/mL / x     / x     / x     / x      CARDIAC MARKERS ( 27 Apr 2021 14:34 )  1.220 ng/mL / x     / x     / x     / x      CARDIAC MARKERS ( 27 Apr 2021 11:48 )  0.980 ng/mL / x     / x     / x     / x      CARDIAC MARKERS ( 27 Apr 2021 08:53 )  0.128 ng/mL / x     / x     / x     / x          Blood Culture: Organism --  Gram Stain Blood -- Gram Stain --  Specimen Source .Urine Clean Catch (Midstream)  Culture-Blood --        04-27 @ 08:53  TSH: 4.32      RADIOLOGY/EKG:< from: 12 Lead ECG (04.28.21 @ 07:53) >  Diagnosis Line Unusual P axis, possible ectopic atrial rhythm with occasional Premature ventricular complexes  Cannot rule out Inferior infarct , age undetermined  ST & T wave abnormality, consider anterolateral ischemia intramural damage    widened Q-T  R/o decreased calcium/decreased potassium or med. effects    When compared with ECG of 27-APR-2021 09:57,  Ectopic atrial rhythm has replaced Sinus rhythm  T wave inversion now evident in Anterolateral leads  Confirmed by MAIKOL HUITRON, JAKI ([id number]) on 4/28/2021 1:47:19 PM    < end of copied text >        ECHO/CARDIAC CATHTERIZATION/STRESS TEST:

## 2021-04-28 NOTE — PROVIDER CONTACT NOTE (MEDICATION) - SITUATION
Called to make PA aware patient is on a heparin drip for a (+) PE on Ct Scan. Her last two Aptt values have been greater than 200. PA advised to continue protocol and inform her of next aptt value if still high. No new orders at this time, No S/S of bleeding noted. Will continue to monitor pt.

## 2021-04-28 NOTE — PROVIDER CONTACT NOTE (OTHER) - SITUATION
answering service aware of consult.
answering service aware of consult
 spoke with Dr.Palla
Spoke with Delores in the office to inform  that patient is in [facility].  Please fax discharge papers to [phone].

## 2021-04-29 ENCOUNTER — TRANSCRIPTION ENCOUNTER (OUTPATIENT)
Age: 87
End: 2021-04-29

## 2021-04-29 LAB
APTT BLD: 58.8 SEC — HIGH (ref 27.5–35.5)
HCT VFR BLD CALC: 33.2 % — LOW (ref 34.5–45)
HGB BLD-MCNC: 11 G/DL — LOW (ref 11.5–15.5)
MCHC RBC-ENTMCNC: 32.6 PG — SIGNIFICANT CHANGE UP (ref 27–34)
MCHC RBC-ENTMCNC: 33.1 GM/DL — SIGNIFICANT CHANGE UP (ref 32–36)
MCV RBC AUTO: 98.5 FL — SIGNIFICANT CHANGE UP (ref 80–100)
PLATELET # BLD AUTO: 122 K/UL — LOW (ref 150–400)
RBC # BLD: 3.37 M/UL — LOW (ref 3.8–5.2)
RBC # FLD: 13.2 % — SIGNIFICANT CHANGE UP (ref 10.3–14.5)
WBC # BLD: 10.84 K/UL — HIGH (ref 3.8–10.5)
WBC # FLD AUTO: 10.84 K/UL — HIGH (ref 3.8–10.5)

## 2021-04-29 PROCEDURE — 99233 SBSQ HOSP IP/OBS HIGH 50: CPT

## 2021-04-29 RX ORDER — TRAMADOL HYDROCHLORIDE 50 MG/1
50 TABLET ORAL EVERY 12 HOURS
Refills: 0 | Status: DISCONTINUED | OUTPATIENT
Start: 2021-04-29 | End: 2021-05-03

## 2021-04-29 RX ORDER — SODIUM CHLORIDE 9 MG/ML
1000 INJECTION INTRAMUSCULAR; INTRAVENOUS; SUBCUTANEOUS
Refills: 0 | Status: DISCONTINUED | OUTPATIENT
Start: 2021-04-29 | End: 2021-04-30

## 2021-04-29 RX ORDER — APIXABAN 2.5 MG/1
10 TABLET, FILM COATED ORAL EVERY 12 HOURS
Refills: 0 | Status: DISCONTINUED | OUTPATIENT
Start: 2021-04-29 | End: 2021-05-03

## 2021-04-29 RX ADMIN — Medication 650 MILLIGRAM(S): at 11:36

## 2021-04-29 RX ADMIN — Medication 25 MILLIGRAM(S): at 21:52

## 2021-04-29 RX ADMIN — Medication 650 MILLIGRAM(S): at 18:05

## 2021-04-29 RX ADMIN — APIXABAN 10 MILLIGRAM(S): 2.5 TABLET, FILM COATED ORAL at 17:29

## 2021-04-29 RX ADMIN — TRAMADOL HYDROCHLORIDE 50 MILLIGRAM(S): 50 TABLET ORAL at 22:30

## 2021-04-29 RX ADMIN — TRAMADOL HYDROCHLORIDE 50 MILLIGRAM(S): 50 TABLET ORAL at 21:53

## 2021-04-29 RX ADMIN — HEPARIN SODIUM 600 UNIT(S)/HR: 5000 INJECTION INTRAVENOUS; SUBCUTANEOUS at 06:47

## 2021-04-29 RX ADMIN — LATANOPROST 1 DROP(S): 0.05 SOLUTION/ DROPS OPHTHALMIC; TOPICAL at 21:52

## 2021-04-29 RX ADMIN — Medication 81 MILLIGRAM(S): at 09:10

## 2021-04-29 RX ADMIN — SODIUM CHLORIDE 80 MILLILITER(S): 9 INJECTION INTRAMUSCULAR; INTRAVENOUS; SUBCUTANEOUS at 17:33

## 2021-04-29 RX ADMIN — Medication 1000 UNIT(S): at 09:10

## 2021-04-29 RX ADMIN — Medication 650 MILLIGRAM(S): at 10:36

## 2021-04-29 RX ADMIN — PANTOPRAZOLE SODIUM 40 MILLIGRAM(S): 20 TABLET, DELAYED RELEASE ORAL at 06:41

## 2021-04-29 RX ADMIN — SIMVASTATIN 20 MILLIGRAM(S): 20 TABLET, FILM COATED ORAL at 21:52

## 2021-04-29 NOTE — PROGRESS NOTE ADULT - SUBJECTIVE AND OBJECTIVE BOX
REASON FOR VISIT: PE    HPI:  89 y/o woman with a history of HTN, HLD, ovarian and colon cancer admitted on 4/27/21 with massive (saddle) PE.    4/28/21: Pt awake and alert. No chest pain. She feels improved.   4/20/21:  L leg pain and concern about arm ecchymoses; breathing comfortably.    MEDICATIONS  (STANDING):  aspirin  chewable 81 milliGRAM(s) Oral daily  cholecalciferol 1000 Unit(s) Oral daily  heparin  Infusion.  Unit(s)/Hr (11 mL/Hr) IV Continuous <Continuous>  latanoprost 0.005% Ophthalmic Solution 1 Drop(s) Right EYE at bedtime  metoprolol tartrate 25 milliGRAM(s) Oral two times a day  pantoprazole    Tablet 40 milliGRAM(s) Oral before breakfast  simvastatin 20 milliGRAM(s) Oral at bedtime    MEDICATIONS  (PRN):  acetaminophen   Tablet .. 650 milliGRAM(s) Oral every 6 hours PRN Mild Pain (1 - 3)  heparin   Injectable 5000 Unit(s) IV Push every 6 hours PRN For aPTT less than 40  heparin   Injectable 2500 Unit(s) IV Push every 6 hours PRN For aPTT between 40 - 57  LORazepam     Tablet 1 milliGRAM(s) Oral two times a day PRN Anxiety  senna 2 Tablet(s) Oral at bedtime PRN Constipation    Vital Signs Last 24 Hrs  T(C): 37 (28 Apr 2021 12:18), Max: 37 (28 Apr 2021 12:18)  T(F): 98.6 (28 Apr 2021 12:18), Max: 98.6 (28 Apr 2021 12:18)  HR: 67 (28 Apr 2021 14:00) (65 - 90)  BP: 102/61 (28 Apr 2021 14:00) (95/53 - 166/77)  BP(mean): 71 (28 Apr 2021 14:00) (64 - 104)  RR: 24 (28 Apr 2021 14:00) (16 - 26)  SpO2: 96% (28 Apr 2021 14:00) (93% - 100%)Daily       PHYSICAL EXAM:  Constitutional: NAD, awake and alert  Respiratory: Breath sounds are clear bilaterally, Nonlabored  Cardiovascular: S1 and S2, regular rate and rhythm,  Gastrointestinal: Bowel Sounds present, soft, nontender.   Skin: Large ecchymoses on arms; warm, dry    LABS:  CARDIAC MARKERS ( 27 Apr 2021 18:05 ) 1.060 ng/mL / x     / x     / x     / x      CARDIAC MARKERS ( 27 Apr 2021 14:34 ) 1.220 ng/mL / x     / x     / x     / x                   11.0   10.84 )-----------( 122      ( 29 Apr 2021 05:51 )             33.2     140  |  108  |  29<H>  ----------------------------<  106<H>  4.8   |  29  |  1.31<H>    Ca    9.5      28 Apr 2021 04:00    CT Angio Chest PE Protocol w/ IV Cont (04.27.21 @ 11:22):  Massive pulmonary embolism and evidence of right heart strain.  Pleural-based opacity in the left lower lobe suspect for pulmonary infarction.     US Duplex Venous Lower Ext Complete, Bilateral (04.28.21 @ 09:20):  Nonocclusive DVT venous thrombosis is noted within the LEFT femoral, popliteal and peroneal veins.    Tele:  SR, Occasional PVC    TTE Echo Complete w/o Contrast w/ Doppler (04.28.21 @ 11:59):     EA reversal of the mitral inflow consistent with reduced compliance of the left ventricle.   Trace mitral regurgitation is present.   Mild (1+) aortic regurgitation is present.   The ascending aorta appears to be mildly dilated.   The left ventricle size is normal, mild hypokinesis, and mildly reduced function. Estimated left ventricular ejection fraction is 45-50 %.   Normal appearing right atrium.   The right ventricle is normal in size. Endocardium not well visualized, likely normal function.

## 2021-04-29 NOTE — PROGRESS NOTE ADULT - SUBJECTIVE AND OBJECTIVE BOX
89 y/o female with no significant PMHx presents to the ED c/o CP. Pt was at home with son when she started to feel weak and like she was going to pass out. Pt's son lowered her to the ground. No LOC. Pt was c/o CP after. Pt still with CP upon arrival. Denies SOB. No other complaints at this time. Adm for saddle PE not a candidate for tpa; no c/o this am    Vital Signs Last 24 Hrs  T(C): 36.4 (29 Apr 2021 14:35), Max: 36.8 (28 Apr 2021 21:26)  T(F): 97.5 (29 Apr 2021 14:35), Max: 98.3 (28 Apr 2021 21:26)  HR: 70 (29 Apr 2021 12:00) (53 - 87)  BP: 110/55 (29 Apr 2021 12:00) (100/56 - 157/59)  BP(mean): 69 (29 Apr 2021 12:00) (56 - 111)  RR: 23 (29 Apr 2021 12:00) (17 - 27)  SpO2: 95% (29 Apr 2021 12:00) (91% - 100%)      Constitutional: NAD, awake and alert, frail  HEENT: PERR, EOMI,  No oral cyananosis.  Neck:  supple,  No JVD  Respiratory: Breath sounds are clear bilaterally, No wheezing, rales or rhonchi  Cardiovascular: S1 and S2, regular rate and rhythm, no Murmurs, gallops or rubs  Gastrointestinal: Bowel Sounds present, soft, nontender.   Extremities: No peripheral edema. No clubbing or cyanosis.  Vascular: 2+ peripheral pulses  Neurological: A/O x 3, no focal deficits                                            11.0   10.84 )-----------( 122      ( 29 Apr 2021 05:51 )             33.2     04-28    140  |  108  |  29<H>  ----------------------------<  106<H>  4.8   |  29  |  1.31<H>    Ca    9.5      28 Apr 2021 04:00  Mg     1.8     04-27    TPro  6.1  /  Alb  3.0<L>  /  TBili  0.4  /  DBili  x   /  AST  23  /  ALT  16  /  AlkPhos  57  04-27      * saddle PE w/ R heart strain.  stable  change to Eliquis  dvt LLE noted not a candidate for IVC- pt is stable I believe just AC should be enough, I will d/w IR    advanced directive.  DNR DNI

## 2021-04-29 NOTE — DISCHARGE NOTE PROVIDER - NSDCCPCAREPLAN_GEN_ALL_CORE_FT
PRINCIPAL DISCHARGE DIAGNOSIS  Diagnosis: Pulmonary embolism  Assessment and Plan of Treatment: on blood thinner now . Off Tamoxifen       PRINCIPAL DISCHARGE DIAGNOSIS  Diagnosis: Pulmonary embolism  Assessment and Plan of Treatment: on blood thinner now . Off Tamoxifen  return to ER if chest pain or trouble rbeathing

## 2021-04-29 NOTE — PROGRESS NOTE ADULT - SUBJECTIVE AND OBJECTIVE BOX
`Subjective:    Home Medications:  LORazepam 1 mg oral tablet: 1 tab(s) orally 2 times a day, As Needed (27 Apr 2021 13:15)  meloxicam 7.5 mg oral tablet: 1 tab(s) orally once a day, As Needed (27 Apr 2021 13:15)  Metoprolol Tartrate 25 mg oral tablet: 1 tab(s) orally 2 times a day (27 Apr 2021 13:15)  pantoprazole 40 mg oral delayed release tablet: 1 tab(s) orally once a day (27 Apr 2021 13:15)  potassium chloride 10 mEq oral tablet, extended release: 1 tab(s) orally 2 times a day (27 Apr 2021 13:15)  simvastatin 20 mg oral tablet: 1 tab(s) orally once a day (at bedtime) (27 Apr 2021 13:15)  tamoxifen 10 mg oral tablet: 1 tab(s) orally 2 times a day (27 Apr 2021 13:15)  Travatan Z 0.004% ophthalmic solution: 1 drop(s) in the right eye once a day (in the evening) (27 Apr 2021 13:15)  Vitamin D3 1000 intl units (25 mcg) oral tablet: 1 tab(s) orally once a day (27 Apr 2021 13:15)    MEDICATIONS  (STANDING):  aspirin  chewable 81 milliGRAM(s) Oral daily  cholecalciferol 1000 Unit(s) Oral daily  heparin  Infusion.  Unit(s)/Hr (11 mL/Hr) IV Continuous <Continuous>  latanoprost 0.005% Ophthalmic Solution 1 Drop(s) Right EYE at bedtime  metoprolol tartrate 25 milliGRAM(s) Oral two times a day  pantoprazole    Tablet 40 milliGRAM(s) Oral before breakfast  simvastatin 20 milliGRAM(s) Oral at bedtime    MEDICATIONS  (PRN):  acetaminophen   Tablet .. 650 milliGRAM(s) Oral every 6 hours PRN Mild Pain (1 - 3)  heparin   Injectable 5000 Unit(s) IV Push every 6 hours PRN For aPTT less than 40  heparin   Injectable 2500 Unit(s) IV Push every 6 hours PRN For aPTT between 40 - 57  LORazepam     Tablet 1 milliGRAM(s) Oral two times a day PRN Anxiety  senna 2 Tablet(s) Oral at bedtime PRN Constipation      Allergies    No Known Drug Allergies  Originally Entered as [Rash] reaction to [adhesive tape] (Unknown)    Intolerances        Vital Signs Last 24 Hrs  T(C): 36.5 (29 Apr 2021 05:48), Max: 37 (28 Apr 2021 12:18)  T(F): 97.7 (29 Apr 2021 05:48), Max: 98.6 (28 Apr 2021 12:18)  HR: 60 (29 Apr 2021 08:00) (53 - 86)  BP: 151/63 (29 Apr 2021 08:00) (100/56 - 157/59)  BP(mean): 85 (29 Apr 2021 08:00) (56 - 111)  RR: 23 (29 Apr 2021 08:00) (16 - 27)  SpO2: 100% (29 Apr 2021 07:00) (91% - 100%)      PHYSICAL EXAMINATION:    NECK:  Supple. No lymphadenopathy. Jugular venous pressure not elevated. Carotids equal.   HEART:   The cardiac impulse has a normal quality. Reg., Nl S1 and S2.  There are no murmurs, rubs or gallops noted  CHEST:  Chest is clear to auscultation. Normal respiratory effort.  ABDOMEN:  Soft and nontender.   EXTREMITIES:  There is no edema.       LABS:                        11.0   10.84 )-----------( 122      ( 29 Apr 2021 05:51 )             33.2     04-28    140  |  108  |  29<H>  ----------------------------<  106<H>  4.8   |  29  |  1.31<H>    Ca    9.5      28 Apr 2021 04:00      PTT - ( 29 Apr 2021 05:51 )  PTT:58.8 sec           Subjective:    pat better, sitting in bed, no new complaint.    Home Medications:  LORazepam 1 mg oral tablet: 1 tab(s) orally 2 times a day, As Needed (27 Apr 2021 13:15)  meloxicam 7.5 mg oral tablet: 1 tab(s) orally once a day, As Needed (27 Apr 2021 13:15)  Metoprolol Tartrate 25 mg oral tablet: 1 tab(s) orally 2 times a day (27 Apr 2021 13:15)  pantoprazole 40 mg oral delayed release tablet: 1 tab(s) orally once a day (27 Apr 2021 13:15)  potassium chloride 10 mEq oral tablet, extended release: 1 tab(s) orally 2 times a day (27 Apr 2021 13:15)  simvastatin 20 mg oral tablet: 1 tab(s) orally once a day (at bedtime) (27 Apr 2021 13:15)  tamoxifen 10 mg oral tablet: 1 tab(s) orally 2 times a day (27 Apr 2021 13:15)  Travatan Z 0.004% ophthalmic solution: 1 drop(s) in the right eye once a day (in the evening) (27 Apr 2021 13:15)  Vitamin D3 1000 intl units (25 mcg) oral tablet: 1 tab(s) orally once a day (27 Apr 2021 13:15)    MEDICATIONS  (STANDING):  aspirin  chewable 81 milliGRAM(s) Oral daily  cholecalciferol 1000 Unit(s) Oral daily  heparin  Infusion.  Unit(s)/Hr (11 mL/Hr) IV Continuous <Continuous>  latanoprost 0.005% Ophthalmic Solution 1 Drop(s) Right EYE at bedtime  metoprolol tartrate 25 milliGRAM(s) Oral two times a day  pantoprazole    Tablet 40 milliGRAM(s) Oral before breakfast  simvastatin 20 milliGRAM(s) Oral at bedtime    MEDICATIONS  (PRN):  acetaminophen   Tablet .. 650 milliGRAM(s) Oral every 6 hours PRN Mild Pain (1 - 3)  heparin   Injectable 5000 Unit(s) IV Push every 6 hours PRN For aPTT less than 40  heparin   Injectable 2500 Unit(s) IV Push every 6 hours PRN For aPTT between 40 - 57  LORazepam     Tablet 1 milliGRAM(s) Oral two times a day PRN Anxiety  senna 2 Tablet(s) Oral at bedtime PRN Constipation      Allergies    No Known Drug Allergies  Originally Entered as [Rash] reaction to [adhesive tape] (Unknown)    Intolerances        Vital Signs Last 24 Hrs  T(C): 36.5 (29 Apr 2021 05:48), Max: 37 (28 Apr 2021 12:18)  T(F): 97.7 (29 Apr 2021 05:48), Max: 98.6 (28 Apr 2021 12:18)  HR: 60 (29 Apr 2021 08:00) (53 - 86)  BP: 151/63 (29 Apr 2021 08:00) (100/56 - 157/59)  BP(mean): 85 (29 Apr 2021 08:00) (56 - 111)  RR: 23 (29 Apr 2021 08:00) (16 - 27)  SpO2: 100% (29 Apr 2021 07:00) (91% - 100%)      PHYSICAL EXAMINATION:    NECK:  Supple. No lymphadenopathy. Jugular venous pressure not elevated. Carotids equal.   HEART:   The cardiac impulse has a normal quality. Reg., Nl S1 and S2.  There are no murmurs, rubs or gallops noted  CHEST:  Chest is clear to auscultation. Normal respiratory effort.  ABDOMEN:  Soft and nontender.   EXTREMITIES:  There is no edema.       LABS:                        11.0   10.84 )-----------( 122      ( 29 Apr 2021 05:51 )             33.2     04-28    140  |  108  |  29<H>  ----------------------------<  106<H>  4.8   |  29  |  1.31<H>    Ca    9.5      28 Apr 2021 04:00      PTT - ( 29 Apr 2021 05:51 )  PTT:58.8 sec        US Duplex Venous Lower Ext Complete, Bilateral (04.28.21 @ 09:20) >  IMPRESSION:  Nonocclusive DVT venous thrombosis is noted within the LEFT femoral, popliteal and peroneal veins..

## 2021-04-29 NOTE — DISCHARGE NOTE PROVIDER - CARE PROVIDER_API CALL
Adan Schneider)  Family Medicine  [address] [place] [address]  Phone: [phone]  Fax: [phone]  Follow Up Time:    Adan Schneider)  Family Medicine  [address] [place] [address]  Phone: [phone]  Fax: [phone]  Follow Up Time:     Julien Smith)  Orthopaedic Surgery  [address] [place] [address], [place] [address]  Phone: [phone]  Fax: [phone]  Follow Up Time:

## 2021-04-29 NOTE — PROGRESS NOTE ADULT - ASSESSMENT
89F.  admitted 04/27/21.  presented to ED c/o CP.  sudden onset.      PROBLEMS;    Saddle PE w/ R heart strain  NSTEMI.     PLAN:    d/w [facility] and advised against transfer, thrombectomy or TPA and to start AC.  Telemetry and O2 saturation monitoring.  HEPARIN-UFH gtt  SUPPORTIVE CARE  D/W staff   89F.  admitted 04/27/21.  presented to ED c/o CP.  sudden onset.      PROBLEMS;    Saddle PE w/ R heart strain  NSTEMI.     PLAN:    titrate fio2 to decd  oob to chair  HEPARIN-UFH gtt  SUPPORTIVE CARE  D/W staff

## 2021-04-29 NOTE — DISCHARGE NOTE PROVIDER - NSDCMRMEDTOKEN_GEN_ALL_CORE_FT
LORazepam 1 mg oral tablet: 1 tab(s) orally 2 times a day, As Needed  meloxicam 7.5 mg oral tablet: 1 tab(s) orally once a day, As Needed  Metoprolol Tartrate 25 mg oral tablet: 1 tab(s) orally 2 times a day  pantoprazole 40 mg oral delayed release tablet: 1 tab(s) orally once a day  potassium chloride 10 mEq oral tablet, extended release: 1 tab(s) orally 2 times a day  simvastatin 20 mg oral tablet: 1 tab(s) orally once a day (at bedtime)  tamoxifen 10 mg oral tablet: 1 tab(s) orally 2 times a day  Travatan Z 0.004% ophthalmic solution: 1 drop(s) in the right eye once a day (in the evening)  Vitamin D3 1000 intl units (25 mcg) oral tablet: 1 tab(s) orally once a day   acetaminophen 325 mg oral tablet: 2 tab(s) orally every 6 hours, As needed, Mild Pain (1 - 3)  apixaban 5 mg oral tablet: 2 tab(s) orally every 12 hours til 5/4/21.  change to 1 tab every 12 hrs starting on 5/5/21  ferrous sulfate 325 mg (65 mg elemental iron) oral tablet: 1 tab(s) orally once a day  LORazepam 1 mg oral tablet: 1 tab(s) orally 2 times a day, As Needed  meloxicam 7.5 mg oral tablet: 1 tab(s) orally once a day, As Needed  Metoprolol Tartrate 25 mg oral tablet: 1 tab(s) orally 2 times a day  pantoprazole 40 mg oral delayed release tablet: 1 tab(s) orally once a day  potassium chloride 10 mEq oral tablet, extended release: 1 tab(s) orally 2 times a day  senna oral tablet: 2 tab(s) orally once a day (at bedtime), As needed, Constipation  simvastatin 20 mg oral tablet: 1 tab(s) orally once a day (at bedtime)  tamoxifen 10 mg oral tablet: 1 tab(s) orally 2 times a day  traMADol 50 mg oral tablet: 1 tab(s) orally every 12 hours, As needed, Moderate Pain (4 - 6)  Travatan Z 0.004% ophthalmic solution: 1 drop(s) in the right eye once a day (in the evening)  Vitamin D3 1000 intl units (25 mcg) oral tablet: 1 tab(s) orally once a day   acetaminophen 325 mg oral tablet: 2 tab(s) orally every 6 hours, As needed, Mild Pain (1 - 3)  apixaban 5 mg oral tablet: 2 tab(s) orally every 12 hours til 5/4/21.  change to 1 tab every 12 hrs starting on 5/5/21  ferrous sulfate 325 mg (65 mg elemental iron) oral tablet: 1 tab(s) orally once a day  LORazepam 1 mg oral tablet: 1 tab(s) orally 2 times a day, As Needed  meloxicam 7.5 mg oral tablet: 1 tab(s) orally once a day, As Needed  Metoprolol Tartrate 25 mg oral tablet: 1 tab(s) orally 2 times a day  pantoprazole 40 mg oral delayed release tablet: 1 tab(s) orally once a day  potassium chloride 10 mEq oral tablet, extended release: 1 tab(s) orally 2 times a day  senna oral tablet: 2 tab(s) orally once a day (at bedtime), As needed, Constipation  simvastatin 20 mg oral tablet: 1 tab(s) orally once a day (at bedtime)  traMADol 50 mg oral tablet: 1 tab(s) orally every 12 hours, As needed, Moderate Pain (4 - 6)  Travatan Z 0.004% ophthalmic solution: 1 drop(s) in the right eye once a day (in the evening)  Vitamin D3 1000 intl units (25 mcg) oral tablet: 1 tab(s) orally once a day

## 2021-04-29 NOTE — DISCHARGE NOTE PROVIDER - HOSPITAL COURSE
PCP Dr Schneider called the office today unable to get in touch will try 4/30    89 y/o female with no significant PMHx presents to the ED c/o CP. Pt was at home with son when she started to feel weak and like she was going to pass out. Pt's son lowered her to the ground. No LOC. Pt was c/o CP after. Pt still with CP upon arrival. Denies SOB. No other complaints at this time. Adm for saddle PE not a candidate for tpa; no c/o this am        Constitutional: NAD, awake and alert, frail  HEENT: PERR, EOMI,  No oral cyananosis.  Neck:  supple,  No JVD  Respiratory: Breath sounds are clear bilaterally, No wheezing, rales or rhonchi  Cardiovascular: S1 and S2, regular rate and rhythm, no Murmurs, gallops or rubs  Gastrointestinal: Bowel Sounds present, soft, nontender.   Extremities: No peripheral edema. No clubbing or cyanosis.  Vascular: 2+ peripheral pulses  Neurological: A/O x 3, no focal deficits                                   * saddle PE w/ R heart strain.  stable  change to Eliquis; off Tamoxifen   dvt LLE noted not a candidate for IVC- pt is stable I believe just AC should be enough, case  d/w IR    advanced directive.  DNR DNI    Son aware he will hel her f/up with the Onc that Rx Tamoxifen, I suspect she is been taking this for >10 years         PCP Dr Schneider called the office today unable to get in touch   ortho - dr mendoza     89 y/o female with no significant PMHx presents to the ED c/o CP. Pt was at home with son when she started to feel weak and like she was going to pass out. Pt's son lowered her to the ground. No LOC. Pt was c/o CP after. Pt still with CP upon arrival. Denies SOB. No other complaints at this time. Adm for saddle PE not a candidate for tpa; no c/o this am        < from: CT Angio Chest PE Protocol w/ IV Cont (04.27.21 @ 11:22) >  IMPRESSION:  Massive pulmonary embolism and evidence of right heart strain.    Pleural-based opacity in the left lower lobe suspect for pulmonary infarction. CT follow-up to resolution is recommended to exclude the possibility of mass.    < end of copied text >  < from: Xray Knee 1 or 2 Views, Left (04.30.21 @ 13:33) >  IMPRESSION:  Large effusion, partially calcified.. Further evaluation with MRI recommended to differentiate hemarthrosis versus PVNS.    < end of copied text >  < from: US Duplex Venous Lower Ext Complete, Bilateral (04.28.21 @ 09:20) >  IMPRESSION:  Nonocclusive DVT venous thrombosis is noted within the LEFT femoral, popliteal and peroneal veins..    < end of copied text >  < from: TTE Echo Complete w/o Contrast w/ Doppler (04.28.21 @ 11:59) >  Impression     Summary     Fibrocalcific changes noted to the mitral valve leaflets with preserved   leaflet excursion.   EA reversal of the mitral inflow consistent with reduced compliance of the   left ventricle.   Trace mitral regurgitation is present.   Fibrocalcific changes noted to the Aortic valve leaflets with preserved   leaflet excursion.   Mild (1+) aortic regurgitation is present.   The ascending aorta appears to be mildly dilated.   The tricuspid valve leaflets are thin and pliable; valve motion is normal.   The left atrium appears normal.   The left ventricle size is normal, mild hypokinesis, and mildly reduced   function.   Estimated left ventricular ejection fraction is 45-50 %.   Normal appearing right atrium.   The right ventricle is normal in size. Endocardium not well visualized,   likely normal function.                         #saddle PE w/ R heart strain.  stable  -dvt LLE noted not a candidate for IVC- pt is stable I believe just AC should be enough, case d/w IR  -TTE 45-50% EF   -Cardiology consult appreciated   -Recommended outpatient f/u for mild LV systolic dysfunction   -continue Eliquis 10 mg BID till may 4th, switch to 5mg BID  -Continue Metoprolol   -Pulmonary consult appreciated, pt stable from pulm standpoint     #left knee pain r/o hemarthrosis with ABL anemia.    -hgb stable at ~9.8, monitor periodically in SNF to ensure stability   -xray knee showed large effusion, calcified   -Ortho consulted, majority of effusion will self resolve  -Pt. declined therapeutic aspiration   -F/u with Dr. Mendoza's office for knee pain and swelling within 2 weeks      advanced directive.  DNR DNI    Son aware he will hel her f/up with the Onc that Rx Tamoxifen, I suspect she is been taking this for >10 years

## 2021-04-29 NOTE — DISCHARGE NOTE PROVIDER - PROVIDER TOKENS
PROVIDER:[TOKEN:[[id number]]] PROVIDER:[TOKEN:[[id number]]],PROVIDER:[TOKEN:[[id number]]]

## 2021-04-30 ENCOUNTER — TRANSCRIPTION ENCOUNTER (OUTPATIENT)
Age: 87
End: 2021-04-30

## 2021-04-30 LAB
APTT BLD: 24.9 SEC — LOW (ref 27.5–35.5)
CRP SERPL-MCNC: 170 MG/L — HIGH
ERYTHROCYTE [SEDIMENTATION RATE] IN BLOOD: 21 MM/HR — HIGH (ref 0–20)
SARS-COV-2 RNA SPEC QL NAA+PROBE: SIGNIFICANT CHANGE UP

## 2021-04-30 PROCEDURE — 73560 X-RAY EXAM OF KNEE 1 OR 2: CPT | Mod: 26,LT

## 2021-04-30 PROCEDURE — 99233 SBSQ HOSP IP/OBS HIGH 50: CPT

## 2021-04-30 RX ADMIN — Medication 650 MILLIGRAM(S): at 18:37

## 2021-04-30 RX ADMIN — APIXABAN 10 MILLIGRAM(S): 2.5 TABLET, FILM COATED ORAL at 06:09

## 2021-04-30 RX ADMIN — Medication 1000 UNIT(S): at 09:32

## 2021-04-30 RX ADMIN — Medication 25 MILLIGRAM(S): at 21:05

## 2021-04-30 RX ADMIN — PANTOPRAZOLE SODIUM 40 MILLIGRAM(S): 20 TABLET, DELAYED RELEASE ORAL at 05:25

## 2021-04-30 RX ADMIN — TRAMADOL HYDROCHLORIDE 50 MILLIGRAM(S): 50 TABLET ORAL at 11:28

## 2021-04-30 RX ADMIN — Medication 81 MILLIGRAM(S): at 09:31

## 2021-04-30 RX ADMIN — Medication 25 MILLIGRAM(S): at 09:31

## 2021-04-30 RX ADMIN — SODIUM CHLORIDE 80 MILLILITER(S): 9 INJECTION INTRAMUSCULAR; INTRAVENOUS; SUBCUTANEOUS at 06:10

## 2021-04-30 RX ADMIN — APIXABAN 10 MILLIGRAM(S): 2.5 TABLET, FILM COATED ORAL at 18:37

## 2021-04-30 RX ADMIN — SIMVASTATIN 20 MILLIGRAM(S): 20 TABLET, FILM COATED ORAL at 21:05

## 2021-04-30 RX ADMIN — LATANOPROST 1 DROP(S): 0.05 SOLUTION/ DROPS OPHTHALMIC; TOPICAL at 21:05

## 2021-04-30 NOTE — PHYSICAL THERAPY INITIAL EVALUATION ADULT - MODALITIES TREATMENT COMMENTS
Pt left OOB in chair with all above SICU Lines and tubes intact; CBIR; Pt instructed to not get up alone; RN Marlee present and aware

## 2021-04-30 NOTE — CONSULT NOTE ADULT - SUBJECTIVE AND OBJECTIVE BOX
Patient is a 89yFemale community ambulator with assistive devices who presents with left knee pain. Patient states she has a history a few years of intermittent back and knee pain for which uses a cane. She was admitted to the hospital for PE with heart strain and started on a heparin drip followed by therapeutic anticoagulation. After this time she complained of increasing left knee pain and swelling. She denies trauma. States hasnt been out of bed during her hospital stay. Denies any numbness or tingling. Denies having any other pain elsewhere. Denies any previous orthopedic history. No other orthopedic concerns at this time.    No pertinent past medical history            No Known Drug Allergies  Originally Entered as [Rash] reaction to [adhesive tape] (Unknown)      PHYSICAL EXAM:  T(C): 37.3 (04-29-21 @ 21:26), Max: 37.4 (04-29-21 @ 16:44)  HR: 80 (04-30-21 @ 12:00) (68 - 95)  BP: 109/55 (04-30-21 @ 12:00) (109/55 - 147/67)  RR: 24 (04-30-21 @ 12:00) (18 - 26)  SpO2: 97% (04-30-21 @ 12:00) (90% - 100%)    Gen: NAD, Resting comfortably    LLE:  Skin intact, no erythema or ecchymosis, palpable suprapatellar effusion  No bony tenderness to palpation  +EHL/FHL/TA/GSC  +SILT L3-S1  + DP  Compartments soft and compressible  No calf tenderness  able to SLR  negative heel strike  negative log roll    Secondary Exam: Benign, Skin intact, NTTP along axial spine, SILT throughout, motor grossly intact throughout, no other orthopedic injuries at this time, compartments soft and compressible      XR: 2 view left knee show tricompartmental degenerative changes and suprapatellar effusion    A/P: 89F w/ left knee effusion    likely 2/2 preexisting arthritis now with hemarthrosis exacerbated by recent start of therapeutic anticoagulation  offered therapeutic aspiration to pt, discussed risk and benefits of pain relief vs infection, pt declining aspiration at thi time  explained majority of this will self resolve, advised to ambulate as tolerated and range knee as much as tolerable  Analgesia  WBAT  continue therapeutic AC per primary team  PT/OT  Ice and elevate as tolerated  can follow up in Dr Smith's office as needed for knee pain, recommend making appointment within 2 weeks if continues to have pain and swelling  No acute orthopedic surgical intervention indicated at this time  Orthopedically stable, to SO  Discussed with attending who is in agreement with above plan

## 2021-04-30 NOTE — PROGRESS NOTE ADULT - ASSESSMENT
89F.  admitted 04/27/21.  presented to ED c/o CP.  sudden onset.      PROBLEMS;    Hypoxaemia  Saddle PE w/ R heart strain  NSTEMI.     PLAN:    Eliquis 10mg bid  titrate fio2 to decd  oob to chair  SUPPORTIVE CARE  D/W staff   89F.  admitted 04/27/21.  presented to ED c/o CP.  sudden onset.      PROBLEMS;    Hypoxaemia  Saddle PE w/ R heart strain  NSTEMI.     PLAN:    pulmonary stable  Eliquis 10mg bid  titrate fio2 to decd  oob to chair  SUPPORTIVE CARE  D/W staff

## 2021-04-30 NOTE — PHYSICAL THERAPY INITIAL EVALUATION ADULT - GENERAL OBSERVATIONS, REHAB EVAL
Pt rec'd supine in bed with HM, BP Cuff, Pulse Oxym, IV all intact; Pt reported 9/10 BLE Pain at rest

## 2021-04-30 NOTE — PROGRESS NOTE ADULT - SUBJECTIVE AND OBJECTIVE BOX
89 y/o female with no significant PMHx presents to the ED c/o CP. Pt was at home with son when she started to feel weak and like she was going to pass out. Pt's son lowered her to the ground. No LOC. Pt was c/o CP after. Pt still with CP upon arrival. Denies SOB. No other complaints at this time. Adm for saddle PE not a candidate for tpa  Pt was doing well but started c/o left knee pain; hemarthrosis suspected due to the acute onset and lack of pain before coming to hospital.     Vital Signs Last 24 Hrs  T(C): 37.3 (29 Apr 2021 21:26), Max: 37.4 (29 Apr 2021 16:44)  T(F): 99.2 (29 Apr 2021 21:26), Max: 99.4 (29 Apr 2021 16:44)  HR: 80 (30 Apr 2021 12:00) (68 - 95)  BP: 109/55 (30 Apr 2021 12:00) (109/55 - 147/67)  BP(mean): 68 (30 Apr 2021 12:00) (62 - 93)  RR: 24 (30 Apr 2021 12:00) (18 - 26)  SpO2: 97% (30 Apr 2021 12:00) (90% - 100%)      Constitutional: NAD, awake and alert, frail  HEENT: PERR, EOMI,  No oral cyananosis.  Neck:  supple,  No JVD  Respiratory: Breath sounds are clear bilaterally, No wheezing, rales or rhonchi  Cardiovascular: S1 and S2, regular rate and rhythm, no Murmurs, gallops or rubs  Gastrointestinal: Bowel Sounds present, soft, nontender.   Extremities: No peripheral edema. No clubbing or cyanosis. tender left knee mostly behind the knee  Vascular: 2+ peripheral pulses  Neurological: A/O x 3, no focal deficits unable to ambulate due to pain/ L knee                                                       11.8   13.62 )-----------( 112      ( 30 Apr 2021 08:06 )             34.8   04-30    137  |  107  |  24<H>  ----------------------------<  117<H>  4.4   |  25  |  1.20    Ca    8.7      30 Apr 2021 08:06          * saddle PE w/ R heart strain.  stable  changed to Eliquis  dvt LLE noted not a candidate for IVC- pt is stable I believe just AC should be enough, case d/w IR    * left knee pain r/o hemarthrosis  xray knee    advanced directive.  DNR DNI    plan for rehab case d/w son        89 y/o female with no significant PMHx presents to the ED c/o CP. Pt was at home with son when she started to feel weak and like she was going to pass out. Pt's son lowered her to the ground. No LOC. Pt was c/o CP after. Pt still with CP upon arrival. Denies SOB. No other complaints at this time. Adm for saddle PE not a candidate for tpa  Pt was doing well but started c/o left knee pain; hemarthrosis suspected due to the acute onset and lack of pain before coming to hospital.     Vital Signs Last 24 Hrs  T(C): 37.3 (29 Apr 2021 21:26), Max: 37.4 (29 Apr 2021 16:44)  T(F): 99.2 (29 Apr 2021 21:26), Max: 99.4 (29 Apr 2021 16:44)  HR: 80 (30 Apr 2021 12:00) (68 - 95)  BP: 109/55 (30 Apr 2021 12:00) (109/55 - 147/67)  BP(mean): 68 (30 Apr 2021 12:00) (62 - 93)  RR: 24 (30 Apr 2021 12:00) (18 - 26)  SpO2: 97% (30 Apr 2021 12:00) (90% - 100%)      Constitutional: NAD, awake and alert, frail  HEENT: PERR, EOMI,  No oral cyananosis.  Neck:  supple,  No JVD  Respiratory: Breath sounds are clear bilaterally, No wheezing, rales or rhonchi  Cardiovascular: S1 and S2, regular rate and rhythm, no Murmurs, gallops or rubs  Gastrointestinal: Bowel Sounds present, soft, nontender.   Extremities: No peripheral edema. No clubbing or cyanosis. tender left knee mostly behind the knee  Vascular: 2+ peripheral pulses  Neurological: A/O x 3, no focal deficits unable to ambulate due to pain/ L knee                                                       11.8   13.62 )-----------( 112      ( 30 Apr 2021 08:06 )             34.8   04-30    137  |  107  |  24<H>  ----------------------------<  117<H>  4.4   |  25  |  1.20    Ca    8.7      30 Apr 2021 08:06          * saddle PE w/ R heart strain.  stable  changed to Eliquis  dvt LLE noted not a candidate for IVC- pt is stable I believe just AC should be enough, case d/w IR    * left knee pain r/o hemarthrosis  xray knee  dc asa    advanced directive.  DNR DNI    plan for rehab case d/w son        89 y/o female with no significant PMHx presents to the ED c/o CP. Pt was at home with son when she started to feel weak and like she was going to pass out. Pt's son lowered her to the ground. No LOC. Pt was c/o CP after. Pt still with CP upon arrival. Denies SOB. No other complaints at this time. Adm for saddle PE not a candidate for tpa  Pt was doing well but started c/o left knee pain; hemarthrosis suspected due to the acute onset and lack of pain before coming to hospital.     Vital Signs Last 24 Hrs  T(C): 37.3 (29 Apr 2021 21:26), Max: 37.4 (29 Apr 2021 16:44)  T(F): 99.2 (29 Apr 2021 21:26), Max: 99.4 (29 Apr 2021 16:44)  HR: 80 (30 Apr 2021 12:00) (68 - 95)  BP: 109/55 (30 Apr 2021 12:00) (109/55 - 147/67)  BP(mean): 68 (30 Apr 2021 12:00) (62 - 93)  RR: 24 (30 Apr 2021 12:00) (18 - 26)  SpO2: 97% (30 Apr 2021 12:00) (90% - 100%)      Constitutional: NAD, awake and alert, frail  HEENT: PERR, EOMI,  No oral cyananosis.  Neck:  supple,  No JVD  Respiratory: Breath sounds are clear bilaterally, No wheezing, rales or rhonchi  Cardiovascular: S1 and S2, regular rate and rhythm, no Murmurs, gallops or rubs  Gastrointestinal: Bowel Sounds present, soft, nontender.   Extremities: No peripheral edema. No clubbing or cyanosis. tender left knee mostly behind the knee  Vascular: 2+ peripheral pulses  Neurological: A/O x 3, no focal deficits unable to ambulate due to pain/ L knee                                                       11.8   13.62 )-----------( 112      ( 30 Apr 2021 08:06 )             34.8   04-30    137  |  107  |  24<H>  ----------------------------<  117<H>  4.4   |  25  |  1.20    Ca    8.7      30 Apr 2021 08:06          * saddle PE w/ R heart strain.  stable  changed to Eliquis  dvt LLE noted not a candidate for IVC- pt is stable I believe just AC should be enough, case d/w IR    * left knee pain r/o hemarthrosis  xray knee  dc asa    * Urinary retention if persistent- Morgan    advanced directive.  DNR DNI    plan for rehab case d/w son

## 2021-04-30 NOTE — DISCHARGE NOTE NURSING/CASE MANAGEMENT/SOCIAL WORK - PATIENT PORTAL LINK FT
You can access the FollowMyHealth Patient Portal offered by [facility] by registering at the following website: [email or web address]. By joining [facility]’s FollowMyHealth portal, you will also be able to view your health information using other applications (apps) compatible with our system.

## 2021-04-30 NOTE — PHYSICAL THERAPY INITIAL EVALUATION ADULT - ACTIVE RANGE OF MOTION EXAMINATION, REHAB EVAL
Except BLE Hip Flex, Knee Flex and Ankle DF all limited due to BLE 9/10 Pain/bilateral upper extremity Active ROM was WFL (within functional limits)/bilateral  lower extremity Active ROM was WFL (within functional limits)

## 2021-04-30 NOTE — PROGRESS NOTE ADULT - SUBJECTIVE AND OBJECTIVE BOX
Subjective:    Home Medications:  LORazepam 1 mg oral tablet: 1 tab(s) orally 2 times a day, As Needed (27 Apr 2021 13:15)  meloxicam 7.5 mg oral tablet: 1 tab(s) orally once a day, As Needed (27 Apr 2021 13:15)  Metoprolol Tartrate 25 mg oral tablet: 1 tab(s) orally 2 times a day (27 Apr 2021 13:15)  pantoprazole 40 mg oral delayed release tablet: 1 tab(s) orally once a day (27 Apr 2021 13:15)  potassium chloride 10 mEq oral tablet, extended release: 1 tab(s) orally 2 times a day (27 Apr 2021 13:15)  simvastatin 20 mg oral tablet: 1 tab(s) orally once a day (at bedtime) (27 Apr 2021 13:15)  tamoxifen 10 mg oral tablet: 1 tab(s) orally 2 times a day (27 Apr 2021 13:15)  Travatan Z 0.004% ophthalmic solution: 1 drop(s) in the right eye once a day (in the evening) (27 Apr 2021 13:15)  Vitamin D3 1000 intl units (25 mcg) oral tablet: 1 tab(s) orally once a day (27 Apr 2021 13:15)    MEDICATIONS  (STANDING):  apixaban 10 milliGRAM(s) Oral every 12 hours  aspirin  chewable 81 milliGRAM(s) Oral daily  cholecalciferol 1000 Unit(s) Oral daily  latanoprost 0.005% Ophthalmic Solution 1 Drop(s) Right EYE at bedtime  metoprolol tartrate 25 milliGRAM(s) Oral two times a day  pantoprazole    Tablet 40 milliGRAM(s) Oral before breakfast  simvastatin 20 milliGRAM(s) Oral at bedtime  sodium chloride 0.9%. 1000 milliLiter(s) (80 mL/Hr) IV Continuous <Continuous>    MEDICATIONS  (PRN):  acetaminophen   Tablet .. 650 milliGRAM(s) Oral every 6 hours PRN Mild Pain (1 - 3)  LORazepam     Tablet 1 milliGRAM(s) Oral two times a day PRN Anxiety  senna 2 Tablet(s) Oral at bedtime PRN Constipation  traMADol 50 milliGRAM(s) Oral every 12 hours PRN Moderate Pain (4 - 6)      Allergies    No Known Drug Allergies  Originally Entered as [Rash] reaction to [adhesive tape] (Unknown)    Intolerances        Vital Signs Last 24 Hrs  T(C): 37.3 (29 Apr 2021 21:26), Max: 37.4 (29 Apr 2021 16:44)  T(F): 99.2 (29 Apr 2021 21:26), Max: 99.4 (29 Apr 2021 16:44)  HR: 78 (30 Apr 2021 03:20) (68 - 95)  BP: 147/57 (30 Apr 2021 03:20) (110/55 - 147/67)  BP(mean): 79 (30 Apr 2021 03:20) (69 - 90)  RR: 26 (30 Apr 2021 03:20) (18 - 26)  SpO2: 92% (30 Apr 2021 03:20) (90% - 100%)      PHYSICAL EXAMINATION:    NECK:  Supple. No lymphadenopathy. Jugular venous pressure not elevated. Carotids equal.   HEART:   The cardiac impulse has a normal quality. Reg., Nl S1 and S2.  There are no murmurs, rubs or gallops noted  CHEST:  Chest is clear to auscultation. Normal respiratory effort.  ABDOMEN:  Soft and nontender.   EXTREMITIES:  There is no edema.       LABS:                        11.8   13.62 )-----------( 112      ( 30 Apr 2021 08:06 )             34.8           PTT - ( 30 Apr 2021 06:13 )  PTT:24.9 sec           Subjective:    pat better, no new complaint, lying in bed.    Home Medications:  LORazepam 1 mg oral tablet: 1 tab(s) orally 2 times a day, As Needed (27 Apr 2021 13:15)  meloxicam 7.5 mg oral tablet: 1 tab(s) orally once a day, As Needed (27 Apr 2021 13:15)  Metoprolol Tartrate 25 mg oral tablet: 1 tab(s) orally 2 times a day (27 Apr 2021 13:15)  pantoprazole 40 mg oral delayed release tablet: 1 tab(s) orally once a day (27 Apr 2021 13:15)  potassium chloride 10 mEq oral tablet, extended release: 1 tab(s) orally 2 times a day (27 Apr 2021 13:15)  simvastatin 20 mg oral tablet: 1 tab(s) orally once a day (at bedtime) (27 Apr 2021 13:15)  tamoxifen 10 mg oral tablet: 1 tab(s) orally 2 times a day (27 Apr 2021 13:15)  Travatan Z 0.004% ophthalmic solution: 1 drop(s) in the right eye once a day (in the evening) (27 Apr 2021 13:15)  Vitamin D3 1000 intl units (25 mcg) oral tablet: 1 tab(s) orally once a day (27 Apr 2021 13:15)    MEDICATIONS  (STANDING):  apixaban 10 milliGRAM(s) Oral every 12 hours  aspirin  chewable 81 milliGRAM(s) Oral daily  cholecalciferol 1000 Unit(s) Oral daily  latanoprost 0.005% Ophthalmic Solution 1 Drop(s) Right EYE at bedtime  metoprolol tartrate 25 milliGRAM(s) Oral two times a day  pantoprazole    Tablet 40 milliGRAM(s) Oral before breakfast  simvastatin 20 milliGRAM(s) Oral at bedtime  sodium chloride 0.9%. 1000 milliLiter(s) (80 mL/Hr) IV Continuous <Continuous>    MEDICATIONS  (PRN):  acetaminophen   Tablet .. 650 milliGRAM(s) Oral every 6 hours PRN Mild Pain (1 - 3)  LORazepam     Tablet 1 milliGRAM(s) Oral two times a day PRN Anxiety  senna 2 Tablet(s) Oral at bedtime PRN Constipation  traMADol 50 milliGRAM(s) Oral every 12 hours PRN Moderate Pain (4 - 6)      Allergies    No Known Drug Allergies  Originally Entered as [Rash] reaction to [adhesive tape] (Unknown)    Intolerances        Vital Signs Last 24 Hrs  T(C): 37.3 (29 Apr 2021 21:26), Max: 37.4 (29 Apr 2021 16:44)  T(F): 99.2 (29 Apr 2021 21:26), Max: 99.4 (29 Apr 2021 16:44)  HR: 78 (30 Apr 2021 03:20) (68 - 95)  BP: 147/57 (30 Apr 2021 03:20) (110/55 - 147/67)  BP(mean): 79 (30 Apr 2021 03:20) (69 - 90)  RR: 26 (30 Apr 2021 03:20) (18 - 26)  SpO2: 92% (30 Apr 2021 03:20) (90% - 100%)      PHYSICAL EXAMINATION:    NECK:  Supple. No lymphadenopathy. Jugular venous pressure not elevated. Carotids equal.   HEART:   The cardiac impulse has a normal quality. Reg., Nl S1 and S2.  There are no murmurs, rubs or gallops noted  CHEST:  Chest crackles to auscultation. Normal respiratory effort.  ABDOMEN:  Soft and nontender.   EXTREMITIES:  There is no edema.       LABS:                        11.8   13.62 )-----------( 112      ( 30 Apr 2021 08:06 )             34.8           PTT - ( 30 Apr 2021 06:13 )  PTT:24.9 sec

## 2021-04-30 NOTE — PROGRESS NOTE ADULT - SUBJECTIVE AND OBJECTIVE BOX
REASON FOR VISIT: PE    HPI:  89 y/o woman with a history of HTN, HLD, ovarian and colon cancer admitted on 4/27/21 with massive (saddle) PE.    4/28/21: Pt awake and alert. No chest pain. She feels improved.   4/29/21:  L leg pain and concern about arm ecchymoses; breathing comfortably.  4/30/21: no complaints    MEDICATIONS:  OUTPATIENT  Home Medications:  LORazepam 1 mg oral tablet: 1 tab(s) orally 2 times a day, As Needed (27 Apr 2021 13:15)  meloxicam 7.5 mg oral tablet: 1 tab(s) orally once a day, As Needed (27 Apr 2021 13:15)  Metoprolol Tartrate 25 mg oral tablet: 1 tab(s) orally 2 times a day (27 Apr 2021 13:15)  pantoprazole 40 mg oral delayed release tablet: 1 tab(s) orally once a day (27 Apr 2021 13:15)  potassium chloride 10 mEq oral tablet, extended release: 1 tab(s) orally 2 times a day (27 Apr 2021 13:15)  simvastatin 20 mg oral tablet: 1 tab(s) orally once a day (at bedtime) (27 Apr 2021 13:15)  tamoxifen 10 mg oral tablet: 1 tab(s) orally 2 times a day (27 Apr 2021 13:15)  Travatan Z 0.004% ophthalmic solution: 1 drop(s) in the right eye once a day (in the evening) (27 Apr 2021 13:15)  Vitamin D3 1000 intl units (25 mcg) oral tablet: 1 tab(s) orally once a day (27 Apr 2021 13:15)      INPATIENT  MEDICATIONS  (STANDING):  apixaban 10 milliGRAM(s) Oral every 12 hours  cholecalciferol 1000 Unit(s) Oral daily  latanoprost 0.005% Ophthalmic Solution 1 Drop(s) Right EYE at bedtime  metoprolol tartrate 25 milliGRAM(s) Oral two times a day  pantoprazole    Tablet 40 milliGRAM(s) Oral before breakfast  simvastatin 20 milliGRAM(s) Oral at bedtime  sodium chloride 0.9%. 1000 milliLiter(s) (80 mL/Hr) IV Continuous <Continuous>    MEDICATIONS  (PRN):  acetaminophen   Tablet .. 650 milliGRAM(s) Oral every 6 hours PRN Mild Pain (1 - 3)  LORazepam     Tablet 1 milliGRAM(s) Oral two times a day PRN Anxiety  senna 2 Tablet(s) Oral at bedtime PRN Constipation  traMADol 50 milliGRAM(s) Oral every 12 hours PRN Moderate Pain (4 - 6)    Vital Signs Last 24 Hrs  T(C): 37.3 (29 Apr 2021 21:26), Max: 37.4 (29 Apr 2021 16:44)  T(F): 99.2 (29 Apr 2021 21:26), Max: 99.4 (29 Apr 2021 16:44)  HR: 80 (30 Apr 2021 12:00) (68 - 95)  BP: 109/55 (30 Apr 2021 12:00) (109/55 - 147/67)  BP(mean): 68 (30 Apr 2021 12:00) (62 - 93)  RR: 24 (30 Apr 2021 12:00) (18 - 26)  SpO2: 97% (30 Apr 2021 12:00) (90% - 100%)Daily     Daily I&O's Summary    29 Apr 2021 07:01  -  30 Apr 2021 07:00  --------------------------------------------------------  IN: 880 mL / OUT: 1100 mL / NET: -220 mL      PHYSICAL EXAM:  Constitutional: NAD, awake and alert  Respiratory: Breath sounds are clear bilaterally, Nonlabored  Cardiovascular: S1 and S2, regular rate and rhythm,  Gastrointestinal: Bowel Sounds present, soft, nontender.   Skin: Large ecchymoses on arms; warm, dry        LABS: All Labs Reviewed:                        11.8   13.62 )-----------( 112      ( 30 Apr 2021 08:06 )             34.8                         11.0   10.84 )-----------( 122      ( 29 Apr 2021 05:51 )             33.2                         12.7   10.84 )-----------( 116      ( 28 Apr 2021 04:00 )             38.4     30 Apr 2021 08:06    137    |  107    |  24     ----------------------------<  117    4.4     |  25     |  1.20   28 Apr 2021 04:00    140    |  108    |  29     ----------------------------<  106    4.8     |  29     |  1.31     Ca    8.7        30 Apr 2021 08:06  Ca    9.5        28 Apr 2021 04:00      PTT - ( 30 Apr 2021 06:13 )  PTT:24.9 sec      Blood Culture: Organism --  Gram Stain Blood -- Gram Stain --  Specimen Source .Urine Clean Catch (Midstream)  Culture-Blood --        CT Angio Chest PE Protocol w/ IV Cont (04.27.21 @ 11:22):  Massive pulmonary embolism and evidence of right heart strain.  Pleural-based opacity in the left lower lobe suspect for pulmonary infarction.     US Duplex Venous Lower Ext Complete, Bilateral (04.28.21 @ 09:20):  Nonocclusive DVT venous thrombosis is noted within the LEFT femoral, popliteal and peroneal veins.    Tele:  SR, Occasional PVC    TTE Echo Complete w/o Contrast w/ Doppler (04.28.21 @ 11:59):     EA reversal of the mitral inflow consistent with reduced compliance of the left ventricle.   Trace mitral regurgitation is present.   Mild (1+) aortic regurgitation is present.   The ascending aorta appears to be mildly dilated.   The left ventricle size is normal, mild hypokinesis, and mildly reduced function. Estimated left ventricular ejection fraction is 45-50 %.   Normal appearing right atrium.   The right ventricle is normal in size. Endocardium not well visualized, likely normal function.    Sunny Cox M.D.  Cardiology, [facility]  Cell: [phone]  Offices:   [phone] ([facility] Office)  [phone] ([facility] Office)

## 2021-05-01 LAB
ANION GAP SERPL CALC-SCNC: 5 MMOL/L — SIGNIFICANT CHANGE UP (ref 5–17)
BUN SERPL-MCNC: 22 MG/DL — SIGNIFICANT CHANGE UP (ref 7–23)
CALCIUM SERPL-MCNC: 9.1 MG/DL — SIGNIFICANT CHANGE UP (ref 8.5–10.1)
CHLORIDE SERPL-SCNC: 108 MMOL/L — SIGNIFICANT CHANGE UP (ref 96–108)
CO2 SERPL-SCNC: 25 MMOL/L — SIGNIFICANT CHANGE UP (ref 22–31)
COVID-19 SPIKE DOMAIN AB INTERP: NEGATIVE — SIGNIFICANT CHANGE UP
COVID-19 SPIKE DOMAIN ANTIBODY RESULT: 0.4 U/ML — SIGNIFICANT CHANGE UP
CREAT SERPL-MCNC: 1.2 MG/DL — SIGNIFICANT CHANGE UP (ref 0.5–1.3)
GLUCOSE SERPL-MCNC: 100 MG/DL — HIGH (ref 70–99)
HCT VFR BLD CALC: 29.2 % — LOW (ref 34.5–45)
HGB BLD-MCNC: 9.7 G/DL — LOW (ref 11.5–15.5)
MCHC RBC-ENTMCNC: 32.4 PG — SIGNIFICANT CHANGE UP (ref 27–34)
MCHC RBC-ENTMCNC: 33.2 GM/DL — SIGNIFICANT CHANGE UP (ref 32–36)
MCV RBC AUTO: 97.7 FL — SIGNIFICANT CHANGE UP (ref 80–100)
PLATELET # BLD AUTO: 122 K/UL — LOW (ref 150–400)
POTASSIUM SERPL-MCNC: 3.8 MMOL/L — SIGNIFICANT CHANGE UP (ref 3.5–5.3)
POTASSIUM SERPL-SCNC: 3.8 MMOL/L — SIGNIFICANT CHANGE UP (ref 3.5–5.3)
RBC # BLD: 2.99 M/UL — LOW (ref 3.8–5.2)
RBC # FLD: 13.4 % — SIGNIFICANT CHANGE UP (ref 10.3–14.5)
SARS-COV-2 IGG+IGM SERPL QL IA: 0.4 U/ML — SIGNIFICANT CHANGE UP
SARS-COV-2 IGG+IGM SERPL QL IA: NEGATIVE — SIGNIFICANT CHANGE UP
SODIUM SERPL-SCNC: 138 MMOL/L — SIGNIFICANT CHANGE UP (ref 135–145)
WBC # BLD: 11.83 K/UL — HIGH (ref 3.8–10.5)
WBC # FLD AUTO: 11.83 K/UL — HIGH (ref 3.8–10.5)

## 2021-05-01 PROCEDURE — 99232 SBSQ HOSP IP/OBS MODERATE 35: CPT

## 2021-05-01 PROCEDURE — 99233 SBSQ HOSP IP/OBS HIGH 50: CPT

## 2021-05-01 RX ADMIN — Medication 25 MILLIGRAM(S): at 09:16

## 2021-05-01 RX ADMIN — LATANOPROST 1 DROP(S): 0.05 SOLUTION/ DROPS OPHTHALMIC; TOPICAL at 21:00

## 2021-05-01 RX ADMIN — Medication 650 MILLIGRAM(S): at 06:06

## 2021-05-01 RX ADMIN — APIXABAN 10 MILLIGRAM(S): 2.5 TABLET, FILM COATED ORAL at 06:17

## 2021-05-01 RX ADMIN — APIXABAN 10 MILLIGRAM(S): 2.5 TABLET, FILM COATED ORAL at 18:12

## 2021-05-01 RX ADMIN — Medication 650 MILLIGRAM(S): at 19:50

## 2021-05-01 RX ADMIN — PANTOPRAZOLE SODIUM 40 MILLIGRAM(S): 20 TABLET, DELAYED RELEASE ORAL at 06:06

## 2021-05-01 RX ADMIN — SIMVASTATIN 20 MILLIGRAM(S): 20 TABLET, FILM COATED ORAL at 21:00

## 2021-05-01 RX ADMIN — Medication 1000 UNIT(S): at 09:16

## 2021-05-01 RX ADMIN — Medication 25 MILLIGRAM(S): at 21:00

## 2021-05-01 NOTE — PROGRESS NOTE ADULT - ASSESSMENT
89 y/o female with no significant PMHx presents to the ED c/o CP. Pt was at home with son when she started to feel weak and like she was going to pass out.    #saddle PE w/ R heart strain.  stable  changed to Eliquis  dvt LLE noted not a candidate for IVC- pt is stable I believe just AC should be enough, case d/w IR  -TTE 45-50% EF   -Cardiology consult appreciated   -Recommended outpatient f/u for mild LV systolic dysfunction   -Continue Metoprolol   -Pulmonary consult appreciated, pt stable from pulm standpoint     #left knee pain r/o hemarthrosis  xray knee showed large effusion, calcified   dc asa  -Ortho consulted, majority of effusion will self resolve  -Pt. declined therapeutic aspiration   -F/u with Dr. Smith's office for knee pain and swelling within 2 weeks     #Urinary retention if persistent- Morgan    #advanced directive.  DNR DNI    #plan for rehab case d/w son   89 y/o female with no significant PMHx presents to the ED c/o CP. Pt was at home with son when she started to feel weak and like she was going to pass out.    #saddle PE w/ R heart strain.  stable  changed to Eliquis  dvt LLE noted not a candidate for IVC- pt is stable I believe just AC should be enough, case d/w IR  -TTE 45-50% EF   -Cardiology consult appreciated   -Recommended outpatient f/u for mild LV systolic dysfunction   -Continue Metoprolol   -Pulmonary consult appreciated, pt stable from pulm standpoint     #left knee pain r/o hemarthrosis with ABL anemia.    - drop in h/h noted. will monitor over the weekend and if stabilizes will ambulate and dc to rehab.    xray knee showed large effusion, calcified   dc asa  -Ortho consulted, majority of effusion will self resolve  -Pt. declined therapeutic aspiration   -F/u with Dr. Smith's office for knee pain and swelling within 2 weeks     #Urinary retention if persistent- Morgan    #advanced directive.  DNR DNI    #plan for rehab on monday if h/h stable.    - called dtr at patient request.

## 2021-05-01 NOTE — PROGRESS NOTE ADULT - PROBLEM SELECTOR PLAN 1
Hemodynamically stable; tolerating unfractionated heparin - transition to DOAC.
Continue Eliquis. Hemodyamically compensated
Hemodynamically stable; now on loading dose eliquis s/p PE - cont. 10 mg BID for 7 days postPE until May 4th then reduce to 5 mg BID.
Hemodynamically stable; tolerating unfractionated heparin now on loading dose eliquis s/p PE.

## 2021-05-01 NOTE — PROGRESS NOTE ADULT - PROBLEM SELECTOR PLAN 2
Mild elevation of troponin is likely relate to PE; however, there appears to be mild LV systolic dysfunction -- recommend outpatient cardiology f/u after discharge with plans to repeat echo. Continue metoprolol.
Mild elevation of troponin is likely relate to PE; however, there appears to be mild LV systolic dysfunction -- recommend outpatient cardiology f/u after discharge with plans to repeat echo. Continue metoprolol.
Probable secondary to above.
Mild elevation of troponin is likely relate to PE; however, there appears to be mild LV systolic dysfunction -- recommend outpatient cardiology f/u after discharge with plans to repeat echo. Continue metoprolol.

## 2021-05-01 NOTE — PROGRESS NOTE ADULT - SUBJECTIVE AND OBJECTIVE BOX
REASON FOR VISIT: PE    HPI:  89 y/o woman with a history of HTN, HLD, ovarian and colon cancer admitted on 4/27/21 with massive (saddle) PE.    4/28/21: Pt awake and alert. No chest pain. She feels improved.   4/29/21:  L leg pain and concern about arm ecchymoses; breathing comfortably.  4/30/21: no complaints  5/1/'21: no dyspnea ambulating to bathroom.    Organism --  Gram Stain Blood -- Gram Stain --  Specimen Source .Urine Clean Catch (Midstream)  Culture-Blood --      MEDICATIONS:  OUTPATIENT  Home Medications:  LORazepam 1 mg oral tablet: 1 tab(s) orally 2 times a day, As Needed (27 Apr 2021 13:15)  meloxicam 7.5 mg oral tablet: 1 tab(s) orally once a day, As Needed (27 Apr 2021 13:15)  Metoprolol Tartrate 25 mg oral tablet: 1 tab(s) orally 2 times a day (27 Apr 2021 13:15)  pantoprazole 40 mg oral delayed release tablet: 1 tab(s) orally once a day (27 Apr 2021 13:15)  potassium chloride 10 mEq oral tablet, extended release: 1 tab(s) orally 2 times a day (27 Apr 2021 13:15)  simvastatin 20 mg oral tablet: 1 tab(s) orally once a day (at bedtime) (27 Apr 2021 13:15)  tamoxifen 10 mg oral tablet: 1 tab(s) orally 2 times a day (27 Apr 2021 13:15)  Travatan Z 0.004% ophthalmic solution: 1 drop(s) in the right eye once a day (in the evening) (27 Apr 2021 13:15)  Vitamin D3 1000 intl units (25 mcg) oral tablet: 1 tab(s) orally once a day (27 Apr 2021 13:15)      INPATIENT  MEDICATIONS  (STANDING):  apixaban 10 milliGRAM(s) Oral every 12 hours  cholecalciferol 1000 Unit(s) Oral daily  latanoprost 0.005% Ophthalmic Solution 1 Drop(s) Right EYE at bedtime  metoprolol tartrate 25 milliGRAM(s) Oral two times a day  pantoprazole    Tablet 40 milliGRAM(s) Oral before breakfast  simvastatin 20 milliGRAM(s) Oral at bedtime    MEDICATIONS  (PRN):  acetaminophen   Tablet .. 650 milliGRAM(s) Oral every 6 hours PRN Mild Pain (1 - 3)  LORazepam     Tablet 1 milliGRAM(s) Oral two times a day PRN Anxiety  senna 2 Tablet(s) Oral at bedtime PRN Constipation  traMADol 50 milliGRAM(s) Oral every 12 hours PRN Moderate Pain (4 - 6)    Vital Signs Last 24 Hrs  T(C): 36.9 (01 May 2021 08:16), Max: 37.2 (30 Apr 2021 20:51)  T(F): 98.5 (01 May 2021 08:16), Max: 98.9 (30 Apr 2021 20:51)  HR: 91 (01 May 2021 08:16) (87 - 94)  BP: 118/56 (01 May 2021 08:16) (113/61 - 136/117)  BP(mean): 122 (30 Apr 2021 16:00) (75 - 122)  RR: 18 (01 May 2021 08:16) (18 - 23)  SpO2: 95% (01 May 2021 08:16) (94% - 98%)Daily     Daily I&O's Summary    30 Apr 2021 07:01  -  01 May 2021 07:00  --------------------------------------------------------  IN: 0 mL / OUT: 375 mL / NET: -375 mL    01 May 2021 07:01  -  01 May 2021 13:51  --------------------------------------------------------  IN: 120 mL / OUT: 0 mL / NET: 120 mL      PHYSICAL EXAM:  Constitutional: NAD, awake and alert  Respiratory: Breath sounds are clear bilaterally, Nonlabored  Cardiovascular: S1 and S2, regular rate and rhythm,  Gastrointestinal: Bowel Sounds present, soft, nontender.       LABS: All Labs Reviewed:                        9.7    11.83 )-----------( 122      ( 01 May 2021 06:54 )             29.2                         11.8   13.62 )-----------( 112      ( 30 Apr 2021 08:06 )             34.8                         11.0   10.84 )-----------( 122      ( 29 Apr 2021 05:51 )             33.2     01 May 2021 06:54    138    |  108    |  22     ----------------------------<  100    3.8     |  25     |  1.20   30 Apr 2021 08:06    137    |  107    |  24     ----------------------------<  117    4.4     |  25     |  1.20     Ca    9.1        01 May 2021 06:54  Ca    8.7        30 Apr 2021 08:06      PTT - ( 30 Apr 2021 06:13 )  PTT:24.9 sec      Blood Culture: Organism --  Gram Stain Blood -- Gram Stain --  Specimen Source .Urine Clean Catch (Midstream)  Culture-Blood --            CT Angio Chest PE Protocol w/ IV Cont (04.27.21 @ 11:22):  Massive pulmonary embolism and evidence of right heart strain.  Pleural-based opacity in the left lower lobe suspect for pulmonary infarction.     US Duplex Venous Lower Ext Complete, Bilateral (04.28.21 @ 09:20):  Nonocclusive DVT venous thrombosis is noted within the LEFT femoral, popliteal and peroneal veins.    Tele:  SR, Occasional PVC    TTE Echo Complete w/o Contrast w/ Doppler (04.28.21 @ 11:59):     EA reversal of the mitral inflow consistent with reduced compliance of the left ventricle.   Trace mitral regurgitation is present.   Mild (1+) aortic regurgitation is present.   The ascending aorta appears to be mildly dilated.   The left ventricle size is normal, mild hypokinesis, and mildly reduced function. Estimated left ventricular ejection fraction is 45-50 %.   Normal appearing right atrium.   The right ventricle is normal in size. Endocardium not well visualized, likely normal function.    Sunny Cox M.D.  Cardiology, [facility]  Cell: [phone]  Offices:   [phone] ([facility] Office)  [phone] ([facility] Office)

## 2021-05-01 NOTE — PROGRESS NOTE ADULT - SUBJECTIVE AND OBJECTIVE BOX
Subjective:    pat better, sitting in bed, no new complaint.    Home Medications:  LORazepam 1 mg oral tablet: 1 tab(s) orally 2 times a day, As Needed (27 Apr 2021 13:15)  meloxicam 7.5 mg oral tablet: 1 tab(s) orally once a day, As Needed (27 Apr 2021 13:15)  Metoprolol Tartrate 25 mg oral tablet: 1 tab(s) orally 2 times a day (27 Apr 2021 13:15)  pantoprazole 40 mg oral delayed release tablet: 1 tab(s) orally once a day (27 Apr 2021 13:15)  potassium chloride 10 mEq oral tablet, extended release: 1 tab(s) orally 2 times a day (27 Apr 2021 13:15)  simvastatin 20 mg oral tablet: 1 tab(s) orally once a day (at bedtime) (27 Apr 2021 13:15)  tamoxifen 10 mg oral tablet: 1 tab(s) orally 2 times a day (27 Apr 2021 13:15)  Travatan Z 0.004% ophthalmic solution: 1 drop(s) in the right eye once a day (in the evening) (27 Apr 2021 13:15)  Vitamin D3 1000 intl units (25 mcg) oral tablet: 1 tab(s) orally once a day (27 Apr 2021 13:15)    MEDICATIONS  (STANDING):  apixaban 10 milliGRAM(s) Oral every 12 hours  cholecalciferol 1000 Unit(s) Oral daily  latanoprost 0.005% Ophthalmic Solution 1 Drop(s) Right EYE at bedtime  metoprolol tartrate 25 milliGRAM(s) Oral two times a day  pantoprazole    Tablet 40 milliGRAM(s) Oral before breakfast  simvastatin 20 milliGRAM(s) Oral at bedtime    MEDICATIONS  (PRN):  acetaminophen   Tablet .. 650 milliGRAM(s) Oral every 6 hours PRN Mild Pain (1 - 3)  LORazepam     Tablet 1 milliGRAM(s) Oral two times a day PRN Anxiety  senna 2 Tablet(s) Oral at bedtime PRN Constipation  traMADol 50 milliGRAM(s) Oral every 12 hours PRN Moderate Pain (4 - 6)      Allergies    No Known Drug Allergies  Originally Entered as [Rash] reaction to [adhesive tape] (Unknown)    Intolerances        Vital Signs Last 24 Hrs  T(C): 36.9 (01 May 2021 08:16), Max: 37.2 (30 Apr 2021 20:51)  T(F): 98.5 (01 May 2021 08:16), Max: 98.9 (30 Apr 2021 20:51)  HR: 91 (01 May 2021 08:16) (91 - 92)  BP: 118/56 (01 May 2021 08:16) (117/55 - 118/56)  BP(mean): --  RR: 18 (01 May 2021 08:16) (18 - 18)  SpO2: 95% (01 May 2021 08:16) (95% - 95%)      PHYSICAL EXAMINATION:    NECK:  Supple. No lymphadenopathy. Jugular venous pressure not elevated. Carotids equal.   HEART:   The cardiac impulse has a normal quality. Reg., Nl S1 and S2.  There are no murmurs, rubs or gallops noted  CHEST:  Chest crackles to auscultation. Normal respiratory effort.  ABDOMEN:  Soft and nontender.   EXTREMITIES:  There is no edema.       LABS:                        9.7    11.83 )-----------( 122      ( 01 May 2021 06:54 )             29.2     05-01    138  |  108  |  22  ----------------------------<  100<H>  3.8   |  25  |  1.20    Ca    9.1      01 May 2021 06:54      PTT - ( 30 Apr 2021 06:13 )  PTT:24.9 sec

## 2021-05-01 NOTE — PROGRESS NOTE ADULT - SUBJECTIVE AND OBJECTIVE BOX
89 y/o female with no significant PMHx presents to the ED c/o CP. Pt was at home with son when she started to feel weak and like she was going to pass out. Pt's son lowered her to the ground. No LOC. Pt was c/o CP after. Pt still with CP upon arrival. Denies SOB. No other complaints at this time. Adm for saddle PE not a candidate for tpa  Pt was doing well but started c/o left knee pain; hemarthrosis suspected due to the acute onset and lack of pain before coming to hospital.     Vital Signs Last 24 Hrs  T(C): 36.9 (01 May 2021 08:16), Max: 37.2 (30 Apr 2021 20:51)  T(F): 98.5 (01 May 2021 08:16), Max: 98.9 (30 Apr 2021 20:51)  HR: 91 (01 May 2021 08:16) (75 - 94)  BP: 118/56 (01 May 2021 08:16) (109/55 - 136/117)  BP(mean): 122 (30 Apr 2021 16:00) (62 - 122)  RR: 18 (01 May 2021 08:16) (18 - 24)  SpO2: 95% (01 May 2021 08:16) (94% - 98%)    Constitutional: NAD, awake and alert, frail  HEENT: PERR, EOMI,  No oral cyananosis.  Neck:  supple,  No JVD  Respiratory: Breath sounds are clear bilaterally, No wheezing, rales or rhonchi  Cardiovascular: S1 and S2, regular rate and rhythm, no Murmurs, gallops or rubs  Gastrointestinal: Bowel Sounds present, soft, nontender.   Extremities: No peripheral edema. No clubbing or cyanosis. tender left knee mostly behind the knee  Vascular: 2+ peripheral pulses  Neurological: A/O x 3, no focal deficits unable to ambulate due to pain/ L knee               Labs reviewed:                        9.7    11.83 )-----------( 122      ( 01 May 2021 06:54 )             29.2     05-01    138  |  108  |  22  ----------------------------<  100<H>  3.8   |  25  |  1.20    Ca    9.1      01 May 2021 06:54    PTT - ( 30 Apr 2021 06:13 )  PTT:24.9 sec    Images reviewed:    < from: CT Angio Chest PE Protocol w/ IV Cont (04.27.21 @ 11:22) >  IMPRESSION:  Massive pulmonary embolism and evidence of right heart strain.    Pleural-based opacity in the left lower lobe suspect for pulmonary infarction. CT follow-up to resolution is recommended to exclude the possibility of mass.    < end of copied text >  < from: Xray Knee 1 or 2 Views, Left (04.30.21 @ 13:33) >  IMPRESSION:  Large effusion, partially calcified.. Further evaluation with MRI recommended to differentiate hemarthrosis versus PVNS.    < end of copied text >  < from: US Duplex Venous Lower Ext Complete, Bilateral (04.28.21 @ 09:20) >  IMPRESSION:  Nonocclusive DVT venous thrombosis is noted within the LEFT femoral, popliteal and peroneal veins..    < end of copied text >  < from: TTE Echo Complete w/o Contrast w/ Doppler (04.28.21 @ 11:59) >  Impression     Summary     Fibrocalcific changes noted to the mitral valve leaflets with preserved   leaflet excursion.   EA reversal of the mitral inflow consistent with reduced compliance of the   left ventricle.   Trace mitral regurgitation is present.   Fibrocalcific changes noted to the Aortic valve leaflets with preserved   leaflet excursion.   Mild (1+) aortic regurgitation is present.   The ascending aorta appears to be mildly dilated.   The tricuspid valve leaflets are thin and pliable; valve motion is normal.   The left atrium appears normal.   The left ventricle size is normal, mild hypokinesis, and mildly reduced   function.   Estimated left ventricular ejection fraction is 45-50 %.   Normal appearing right atrium.   The right ventricle is normal in size. Endocardium not well visualized,   likely normal function.    < end of copied text >                                          89 y/o female with no significant PMHx presents to the ED c/o CP. Pt was at home with son when she started to feel weak and like she was going to pass out. Pt's son lowered her to the ground. No LOC. Pt was c/o CP after. Pt still with CP upon arrival. Denies SOB. No other complaints at this time. Adm for saddle PE not a candidate for tpa  Pt was doing well but started c/o left knee pain; hemarthrosis suspected due to the acute onset and lack of pain before coming to hospital.   5/1 - pain well controlled. no pleuritic cp. minimal SOb with exertion     Vital Signs Last 24 Hrs  T(C): 36.9 (01 May 2021 08:16), Max: 37.2 (30 Apr 2021 20:51)  T(F): 98.5 (01 May 2021 08:16), Max: 98.9 (30 Apr 2021 20:51)  HR: 91 (01 May 2021 08:16) (75 - 94)  BP: 118/56 (01 May 2021 08:16) (109/55 - 136/117)  BP(mean): 122 (30 Apr 2021 16:00) (62 - 122)  RR: 18 (01 May 2021 08:16) (18 - 24)  SpO2: 95% (01 May 2021 08:16) (94% - 98%)    GEN: lying in bed, NAD  HEENT:   NC/AT, pupils equal and reactive, EOMI  CV:  +S1, +S2, RRR  RESP:   lungs clear to auscultation bilaterally, no wheeze, rales, rhonchi   BREAST:  not examined  GI:  abdomen soft, non-tender, non-distended, normoactive BS  RECTAL:  not examined  :  not examined  MSK:   normal muscle tone  EXT: left knee pain with tenderness behind knee.  left knee larger than right   NEURO:  AAOX3, no focal neurological deficits  SKIN:  no rashes               Labs reviewed:                        9.7 11.83 )-----------( 122      ( 01 May 2021 06:54 )             29.2     05-01    138  |  108  |  22  ----------------------------<  100<H>  3.8   |  25  |  1.20    Ca    9.1      01 May 2021 06:54    PTT - ( 30 Apr 2021 06:13 )  PTT:24.9 sec    Images reviewed:    < from: CT Angio Chest PE Protocol w/ IV Cont (04.27.21 @ 11:22) >  IMPRESSION:  Massive pulmonary embolism and evidence of right heart strain.    Pleural-based opacity in the left lower lobe suspect for pulmonary infarction. CT follow-up to resolution is recommended to exclude the possibility of mass.    < end of copied text >  < from: Xray Knee 1 or 2 Views, Left (04.30.21 @ 13:33) >  IMPRESSION:  Large effusion, partially calcified.. Further evaluation with MRI recommended to differentiate hemarthrosis versus PVNS.    < end of copied text >  < from: US Duplex Venous Lower Ext Complete, Bilateral (04.28.21 @ 09:20) >  IMPRESSION:  Nonocclusive DVT venous thrombosis is noted within the LEFT femoral, popliteal and peroneal veins..    < end of copied text >  < from: TTE Echo Complete w/o Contrast w/ Doppler (04.28.21 @ 11:59) >  Impression     Summary     Fibrocalcific changes noted to the mitral valve leaflets with preserved   leaflet excursion.   EA reversal of the mitral inflow consistent with reduced compliance of the   left ventricle.   Trace mitral regurgitation is present.   Fibrocalcific changes noted to the Aortic valve leaflets with preserved   leaflet excursion.   Mild (1+) aortic regurgitation is present.   The ascending aorta appears to be mildly dilated.   The tricuspid valve leaflets are thin and pliable; valve motion is normal.   The left atrium appears normal.   The left ventricle size is normal, mild hypokinesis, and mildly reduced   function.   Estimated left ventricular ejection fraction is 45-50 %.   Normal appearing right atrium.   The right ventricle is normal in size. Endocardium not well visualized,   likely normal function.    < end of copied text >

## 2021-05-01 NOTE — PROGRESS NOTE ADULT - ATTENDING COMMENTS
patient seen and examined with PA student Angie June,  I  was physically present for the key portions of the evaluation and management (E/M) service provided.  I agree with the above history, physical, and plan which I have reviewed and edited where appropriate.  - drop in h/h noted  - given saddle embolus will not stop AC and monitor cbc in AM  - ortho input noted  - if h/h stable paln for dc monday ?rehab patient seen and examined with PA student Angie June,  I  was physically present for the key portions of the evaluation and management (E/M) service provided.  I agree with the above history, physical, and plan which I have reviewed and edited where appropriate.  - drop in h/h noted  - given saddle embolus will not stop AC and monitor cbc in AM  - ortho input noted  - if h/h stable paln for dc monday ?rehab    called [phone]

## 2021-05-02 LAB
ALBUMIN SERPL ELPH-MCNC: 2.2 G/DL — LOW (ref 3.3–5)
ALP SERPL-CCNC: 58 U/L — SIGNIFICANT CHANGE UP (ref 40–120)
ALT FLD-CCNC: 18 U/L — SIGNIFICANT CHANGE UP (ref 12–78)
ANION GAP SERPL CALC-SCNC: 7 MMOL/L — SIGNIFICANT CHANGE UP (ref 5–17)
AST SERPL-CCNC: 25 U/L — SIGNIFICANT CHANGE UP (ref 15–37)
BILIRUB SERPL-MCNC: 0.6 MG/DL — SIGNIFICANT CHANGE UP (ref 0.2–1.2)
BUN SERPL-MCNC: 22 MG/DL — SIGNIFICANT CHANGE UP (ref 7–23)
CALCIUM SERPL-MCNC: 9.1 MG/DL — SIGNIFICANT CHANGE UP (ref 8.5–10.1)
CHLORIDE SERPL-SCNC: 106 MMOL/L — SIGNIFICANT CHANGE UP (ref 96–108)
CO2 SERPL-SCNC: 24 MMOL/L — SIGNIFICANT CHANGE UP (ref 22–31)
CREAT SERPL-MCNC: 1.3 MG/DL — SIGNIFICANT CHANGE UP (ref 0.5–1.3)
GLUCOSE SERPL-MCNC: 125 MG/DL — HIGH (ref 70–99)
HCT VFR BLD CALC: 29.8 % — LOW (ref 34.5–45)
HGB BLD-MCNC: 10.1 G/DL — LOW (ref 11.5–15.5)
MCHC RBC-ENTMCNC: 33.3 PG — SIGNIFICANT CHANGE UP (ref 27–34)
MCHC RBC-ENTMCNC: 33.9 GM/DL — SIGNIFICANT CHANGE UP (ref 32–36)
MCV RBC AUTO: 98.3 FL — SIGNIFICANT CHANGE UP (ref 80–100)
PLATELET # BLD AUTO: 143 K/UL — LOW (ref 150–400)
POTASSIUM SERPL-MCNC: 3.7 MMOL/L — SIGNIFICANT CHANGE UP (ref 3.5–5.3)
POTASSIUM SERPL-SCNC: 3.7 MMOL/L — SIGNIFICANT CHANGE UP (ref 3.5–5.3)
PROT SERPL-MCNC: 5.9 GM/DL — LOW (ref 6–8.3)
RBC # BLD: 3.03 M/UL — LOW (ref 3.8–5.2)
RBC # FLD: 13.2 % — SIGNIFICANT CHANGE UP (ref 10.3–14.5)
SARS-COV-2 RNA SPEC QL NAA+PROBE: SIGNIFICANT CHANGE UP
SODIUM SERPL-SCNC: 137 MMOL/L — SIGNIFICANT CHANGE UP (ref 135–145)
WBC # BLD: 10.2 K/UL — SIGNIFICANT CHANGE UP (ref 3.8–10.5)
WBC # FLD AUTO: 10.2 K/UL — SIGNIFICANT CHANGE UP (ref 3.8–10.5)

## 2021-05-02 PROCEDURE — 99232 SBSQ HOSP IP/OBS MODERATE 35: CPT

## 2021-05-02 RX ORDER — FERROUS SULFATE 325(65) MG
325 TABLET ORAL DAILY
Refills: 0 | Status: DISCONTINUED | OUTPATIENT
Start: 2021-05-02 | End: 2021-05-03

## 2021-05-02 RX ADMIN — PANTOPRAZOLE SODIUM 40 MILLIGRAM(S): 20 TABLET, DELAYED RELEASE ORAL at 05:56

## 2021-05-02 RX ADMIN — Medication 1000 UNIT(S): at 09:16

## 2021-05-02 RX ADMIN — Medication 25 MILLIGRAM(S): at 09:16

## 2021-05-02 RX ADMIN — SIMVASTATIN 20 MILLIGRAM(S): 20 TABLET, FILM COATED ORAL at 21:55

## 2021-05-02 RX ADMIN — Medication 25 MILLIGRAM(S): at 21:55

## 2021-05-02 RX ADMIN — Medication 650 MILLIGRAM(S): at 19:42

## 2021-05-02 RX ADMIN — Medication 650 MILLIGRAM(S): at 20:15

## 2021-05-02 RX ADMIN — TRAMADOL HYDROCHLORIDE 50 MILLIGRAM(S): 50 TABLET ORAL at 21:55

## 2021-05-02 RX ADMIN — LATANOPROST 1 DROP(S): 0.05 SOLUTION/ DROPS OPHTHALMIC; TOPICAL at 21:56

## 2021-05-02 RX ADMIN — APIXABAN 10 MILLIGRAM(S): 2.5 TABLET, FILM COATED ORAL at 16:30

## 2021-05-02 RX ADMIN — APIXABAN 10 MILLIGRAM(S): 2.5 TABLET, FILM COATED ORAL at 05:54

## 2021-05-02 NOTE — PROGRESS NOTE ADULT - SUBJECTIVE AND OBJECTIVE BOX
89 y/o female with no significant PMHx presents to the ED c/o CP. Pt was at home with son when she started to feel weak and like she was going to pass out. Pt's son lowered her to the ground. No LOC. Pt was c/o CP after. Pt still with CP upon arrival. Denies SOB. No other complaints at this time. Adm for saddle PE not a candidate for tpa  Pt was doing well but started c/o left knee pain; hemarthrosis suspected due to the acute onset and lack of pain before coming to hospital.   5/1 - pain well controlled. no pleuritic cp. minimal SOb with exertion   5/2- pt. assessed at [facility]. Denies complaints of pain or discomfort.     Vital Signs Last 24 Hrs  T(C): 36.7 (02 May 2021 07:39), Max: 37 (01 May 2021 23:34)  T(F): 98.1 (02 May 2021 07:39), Max: 98.6 (01 May 2021 23:34)  HR: 74 (02 May 2021 07:39) (74 - 86)  BP: 124/56 (02 May 2021 07:39) (106/62 - 124/56)  BP(mean): --  RR: 18 (02 May 2021 07:39) (18 - 18)  SpO2: 96% (02 May 2021 07:39) (96% - 96%)    GEN: lying in bed, NAD  HEENT:   NC/AT, pupils equal and reactive, EOMI  CV:  +S1, +S2, RRR  RESP:   lungs clear to auscultation bilaterally, no wheeze, rales, rhonchi   BREAST:  not examined  GI:  abdomen soft, non-tender, non-distended, normoactive BS  RECTAL:  not examined  :  not examined  MSK:   normal muscle tone  EXT: left knee pain with tenderness behind knee.  left knee larger than right   NEURO:  AAOX3, no focal neurological deficits  SKIN:  no rashes               Labs reviewed:             10.1   10.20 )-----------( 143      ( 02 May 2021 08:32 )             29.8     05-02    137  |  106  |  22  ----------------------------<  125<H>  3.7   |  24  |  1.30    Ca    9.1      02 May 2021 08:32    TPro  5.9<L>  /  Alb  2.2<L>  /  TBili  0.6  /  DBili  x   /  AST  25  /  ALT  18  /  AlkPhos  58  05-02        LIVER FUNCTIONS - ( 02 May 2021 08:32 )  Alb: 2.2 g/dL / Pro: 5.9 gm/dL / ALK PHOS: 58 U/L / ALT: 18 U/L / AST: 25 U/L / GGT: x           Images reviewed:    < from: CT Angio Chest PE Protocol w/ IV Cont (04.27.21 @ 11:22) >  IMPRESSION:  Massive pulmonary embolism and evidence of right heart strain.    Pleural-based opacity in the left lower lobe suspect for pulmonary infarction. CT follow-up to resolution is recommended to exclude the possibility of mass.    < end of copied text >  < from: Xray Knee 1 or 2 Views, Left (04.30.21 @ 13:33) >  IMPRESSION:  Large effusion, partially calcified.. Further evaluation with MRI recommended to differentiate hemarthrosis versus PVNS.    < end of copied text >  < from: US Duplex Venous Lower Ext Complete, Bilateral (04.28.21 @ 09:20) >  IMPRESSION:  Nonocclusive DVT venous thrombosis is noted within the LEFT femoral, popliteal and peroneal veins..    < end of copied text >  < from: TTE Echo Complete w/o Contrast w/ Doppler (04.28.21 @ 11:59) >  Impression     Summary     Fibrocalcific changes noted to the mitral valve leaflets with preserved   leaflet excursion.   EA reversal of the mitral inflow consistent with reduced compliance of the   left ventricle.   Trace mitral regurgitation is present.   Fibrocalcific changes noted to the Aortic valve leaflets with preserved   leaflet excursion.   Mild (1+) aortic regurgitation is present.   The ascending aorta appears to be mildly dilated.   The tricuspid valve leaflets are thin and pliable; valve motion is normal.   The left atrium appears normal.   The left ventricle size is normal, mild hypokinesis, and mildly reduced   function.   Estimated left ventricular ejection fraction is 45-50 %.   Normal appearing right atrium.   The right ventricle is normal in size. Endocardium not well visualized,   likely normal function.    < end of copied text >

## 2021-05-02 NOTE — PROGRESS NOTE ADULT - ATTENDING COMMENTS
patient seen and examined with PA student Angie June,  I  was physically present for the key portions of the evaluation and management (E/M) service provided.  I agree with the above history, physical, and plan which I have reviewed and edited where appropriate.  - hgb improving  - check cbc in AM and if stable will dc to [facility]

## 2021-05-02 NOTE — PROGRESS NOTE ADULT - SUBJECTIVE AND OBJECTIVE BOX
Subjective:    pat sitting in chair, no respiratory complaint.    Home Medications:  LORazepam 1 mg oral tablet: 1 tab(s) orally 2 times a day, As Needed (27 Apr 2021 13:15)  meloxicam 7.5 mg oral tablet: 1 tab(s) orally once a day, As Needed (27 Apr 2021 13:15)  Metoprolol Tartrate 25 mg oral tablet: 1 tab(s) orally 2 times a day (27 Apr 2021 13:15)  pantoprazole 40 mg oral delayed release tablet: 1 tab(s) orally once a day (27 Apr 2021 13:15)  potassium chloride 10 mEq oral tablet, extended release: 1 tab(s) orally 2 times a day (27 Apr 2021 13:15)  simvastatin 20 mg oral tablet: 1 tab(s) orally once a day (at bedtime) (27 Apr 2021 13:15)  tamoxifen 10 mg oral tablet: 1 tab(s) orally 2 times a day (27 Apr 2021 13:15)  Travatan Z 0.004% ophthalmic solution: 1 drop(s) in the right eye once a day (in the evening) (27 Apr 2021 13:15)  Vitamin D3 1000 intl units (25 mcg) oral tablet: 1 tab(s) orally once a day (27 Apr 2021 13:15)    MEDICATIONS  (STANDING):  apixaban 10 milliGRAM(s) Oral every 12 hours  cholecalciferol 1000 Unit(s) Oral daily  ferrous    sulfate 325 milliGRAM(s) Oral daily  latanoprost 0.005% Ophthalmic Solution 1 Drop(s) Right EYE at bedtime  metoprolol tartrate 25 milliGRAM(s) Oral two times a day  pantoprazole    Tablet 40 milliGRAM(s) Oral before breakfast  simvastatin 20 milliGRAM(s) Oral at bedtime    MEDICATIONS  (PRN):  acetaminophen   Tablet .. 650 milliGRAM(s) Oral every 6 hours PRN Mild Pain (1 - 3)  LORazepam     Tablet 1 milliGRAM(s) Oral two times a day PRN Anxiety  senna 2 Tablet(s) Oral at bedtime PRN Constipation  traMADol 50 milliGRAM(s) Oral every 12 hours PRN Moderate Pain (4 - 6)      Allergies    No Known Drug Allergies  Originally Entered as [Rash] reaction to [adhesive tape] (Unknown)    Intolerances        Vital Signs Last 24 Hrs  T(C): 36.7 (02 May 2021 07:39), Max: 37 (01 May 2021 23:34)  T(F): 98.1 (02 May 2021 07:39), Max: 98.6 (01 May 2021 23:34)  HR: 74 (02 May 2021 07:39) (74 - 86)  BP: 124/56 (02 May 2021 07:39) (106/62 - 124/56)  BP(mean): --  RR: 18 (02 May 2021 07:39) (18 - 18)  SpO2: 96% (02 May 2021 07:39) (96% - 96%)      PHYSICAL EXAMINATION:    NECK:  Supple. No lymphadenopathy. Jugular venous pressure not elevated. Carotids equal.   HEART:   The cardiac impulse has a normal quality. Reg., Nl S1 and S2.  There are no murmurs, rubs or gallops noted  CHEST:  Chest crackles to auscultation. Normal respiratory effort.  ABDOMEN:  Soft and nontender.   EXTREMITIES:  There is no edema.       LABS:                        10.1   10.20 )-----------( 143      ( 02 May 2021 08:32 )             29.8     05-02    137  |  106  |  22  ----------------------------<  125<H>  3.7   |  24  |  1.30    Ca    9.1      02 May 2021 08:32    TPro  5.9<L>  /  Alb  2.2<L>  /  TBili  0.6  /  DBili  x   /  AST  25  /  ALT  18  /  AlkPhos  58  05-02

## 2021-05-02 NOTE — PROGRESS NOTE ADULT - ASSESSMENT
89 y/o female with no significant PMHx presents to the ED c/o CP. Pt was at home with son when she started to feel weak and like she was going to pass out.    #saddle PE w/ R heart strain.  stable  -dvt LLE noted not a candidate for IVC- pt is stable I believe just AC should be enough, case d/w IR  -TTE 45-50% EF   -Cardiology consult appreciated   -Recommended outpatient f/u for mild LV systolic dysfunction   -continue Eliquis 10 mg BID till may 4th, switch to 5mg BID  -Continue Metoprolol   -Pulmonary consult appreciated, pt stable from pulm standpoint     #left knee pain r/o hemarthrosis with ABL anemia.    -hgb 10.1, slightly improved, will continue to monitor h/h, will ambulate and if stable discharge monday   -xray knee showed large effusion, calcified   -Ortho consulted, majority of effusion will self resolve  -Pt. declined therapeutic aspiration   -F/u with Dr. Smith's office for knee pain and swelling within 2 weeks     #Urinary retention if persistent- Morgan    #advanced directive.  DNR DNI    #plan for rehab on monday if h/h stable.    - called dtr at patient request.

## 2021-05-02 NOTE — PROGRESS NOTE ADULT - ASSESSMENT
89F.  admitted 04/27/21.  presented to ED c/o CP.  sudden onset.      PROBLEMS;    Hypoxaemia  Saddle PE w/ R heart strain  NSTEMI     PLAN:    pulmonary stable-rehab placement  Eliquis 10mg bid  oob to chair  SUPPORTIVE CARE  D/W staff/ dr disla

## 2021-05-03 VITALS
HEART RATE: 65 BPM | SYSTOLIC BLOOD PRESSURE: 137 MMHG | DIASTOLIC BLOOD PRESSURE: 60 MMHG | TEMPERATURE: 99 F | OXYGEN SATURATION: 96 % | RESPIRATION RATE: 18 BRPM

## 2021-05-03 LAB
HCT VFR BLD CALC: 28.3 % — LOW (ref 34.5–45)
HCT VFR BLD CALC: 29.6 % — LOW (ref 34.5–45)
HGB BLD-MCNC: 9.4 G/DL — LOW (ref 11.5–15.5)
HGB BLD-MCNC: 9.9 G/DL — LOW (ref 11.5–15.5)
MCHC RBC-ENTMCNC: 32.4 PG — SIGNIFICANT CHANGE UP (ref 27–34)
MCHC RBC-ENTMCNC: 33.2 GM/DL — SIGNIFICANT CHANGE UP (ref 32–36)
MCV RBC AUTO: 97.6 FL — SIGNIFICANT CHANGE UP (ref 80–100)
PLATELET # BLD AUTO: 161 K/UL — SIGNIFICANT CHANGE UP (ref 150–400)
RBC # BLD: 2.9 M/UL — LOW (ref 3.8–5.2)
RBC # FLD: 12.9 % — SIGNIFICANT CHANGE UP (ref 10.3–14.5)
WBC # BLD: 8.43 K/UL — SIGNIFICANT CHANGE UP (ref 3.8–10.5)
WBC # FLD AUTO: 8.43 K/UL — SIGNIFICANT CHANGE UP (ref 3.8–10.5)

## 2021-05-03 PROCEDURE — 99239 HOSP IP/OBS DSCHRG MGMT >30: CPT

## 2021-05-03 RX ORDER — FERROUS SULFATE 325(65) MG
1 TABLET ORAL
Qty: 0 | Refills: 0 | DISCHARGE
Start: 2021-05-03

## 2021-05-03 RX ORDER — TAMOXIFEN CITRATE 20 MG/1
1 TABLET, FILM COATED ORAL
Qty: 0 | Refills: 0 | DISCHARGE

## 2021-05-03 RX ORDER — TRAMADOL HYDROCHLORIDE 50 MG/1
1 TABLET ORAL
Qty: 0 | Refills: 0 | DISCHARGE
Start: 2021-05-03

## 2021-05-03 RX ORDER — APIXABAN 2.5 MG/1
2 TABLET, FILM COATED ORAL
Qty: 0 | Refills: 0 | DISCHARGE
Start: 2021-05-03

## 2021-05-03 RX ORDER — ACETAMINOPHEN 500 MG
2 TABLET ORAL
Qty: 0 | Refills: 0 | DISCHARGE
Start: 2021-05-03

## 2021-05-03 RX ORDER — SENNA PLUS 8.6 MG/1
2 TABLET ORAL
Qty: 0 | Refills: 0 | DISCHARGE
Start: 2021-05-03

## 2021-05-03 RX ADMIN — Medication 1000 UNIT(S): at 09:25

## 2021-05-03 RX ADMIN — Medication 25 MILLIGRAM(S): at 09:25

## 2021-05-03 RX ADMIN — Medication 325 MILLIGRAM(S): at 09:25

## 2021-05-03 RX ADMIN — APIXABAN 10 MILLIGRAM(S): 2.5 TABLET, FILM COATED ORAL at 06:36

## 2021-05-03 RX ADMIN — PANTOPRAZOLE SODIUM 40 MILLIGRAM(S): 20 TABLET, DELAYED RELEASE ORAL at 06:36

## 2021-05-03 NOTE — PROGRESS NOTE ADULT - ASSESSMENT
89 y/o female with no significant PMHx presents to the ED c/o CP. Pt was at home with son when she started to feel weak and like she was going to pass out.    #saddle PE w/ R heart strain.  stable  -dvt LLE noted not a candidate for IVC- pt is stable I believe just AC should be enough, case d/w IR  -TTE 45-50% EF   -Cardiology consult appreciated   -Recommended outpatient f/u for mild LV systolic dysfunction   -continue Eliquis 10 mg BID till may 4th, switch to 5mg BID  -Continue Metoprolol   -Pulmonary consult appreciated, pt stable from pulm standpoint     #left knee pain r/o hemarthrosis with ABL anemia.    -hgb 9.4, will continue to monitor h/h, will ambulate and if stable discharge monday   -xray knee showed large effusion, calcified   -Ortho consulted, majority of effusion will self resolve  -Pt. declined therapeutic aspiration   -F/u with Dr. Smith's office for knee pain and swelling within 2 weeks     #Urinary retention if persistent- Morgan    #advanced directive.  DNR DNI    #plan for rehab on monday if h/h stable.    - called dtr at patient request.    89 y/o female with no significant PMHx presents to the ED c/o CP. Pt was at home with son when she started to feel weak and like she was going to pass out.    #saddle PE w/ R heart strain.  stable  -dvt LLE noted not a candidate for IVC- pt is stable I believe just AC should be enough, case d/w IR  -TTE 45-50% EF   -Cardiology consult appreciated   -Recommended outpatient f/u for mild LV systolic dysfunction   -continue Eliquis 10 mg BID till may 4th, switch to 5mg BID  -Continue Metoprolol   -Pulmonary consult appreciated, pt stable from pulm standpoint     #left knee pain r/o hemarthrosis with ABL anemia.    -hgb 9.4, will continue to monitor h/h, will ambulate and if stable discharge monday   -xray knee showed large effusion, calcified   -Ortho consulted, majority of effusion will self resolve  -Pt. declined therapeutic aspiration   -F/u with Dr. Smith's office for knee pain and swelling within 2 weeks     #Urinary retention if persistent- Morgan    #advanced directive.  DNR DNI    #plan for rehab on monday if h/h stable.    - called dtr at patient request. - no answer today but she is aware of plan to dc today based on conversation from y esterday

## 2021-05-03 NOTE — PROGRESS NOTE ADULT - PROVIDER SPECIALTY LIST ADULT
Hospitalist
Hospitalist
Pulmonology
Pulmonology
Cardiology
Hospitalist
Pulmonology
Pulmonology
Cardiology
Pulmonology
Cardiology
Cardiology

## 2021-05-03 NOTE — PROGRESS NOTE ADULT - NSICDXPILOT_GEN_ALL_CORE
[place]

## 2021-05-03 NOTE — PROGRESS NOTE ADULT - SUBJECTIVE AND OBJECTIVE BOX
Subjective:    Home Medications:  LORazepam 1 mg oral tablet: 1 tab(s) orally 2 times a day, As Needed (27 Apr 2021 13:15)  meloxicam 7.5 mg oral tablet: 1 tab(s) orally once a day, As Needed (27 Apr 2021 13:15)  Metoprolol Tartrate 25 mg oral tablet: 1 tab(s) orally 2 times a day (27 Apr 2021 13:15)  pantoprazole 40 mg oral delayed release tablet: 1 tab(s) orally once a day (27 Apr 2021 13:15)  potassium chloride 10 mEq oral tablet, extended release: 1 tab(s) orally 2 times a day (27 Apr 2021 13:15)  simvastatin 20 mg oral tablet: 1 tab(s) orally once a day (at bedtime) (27 Apr 2021 13:15)  tamoxifen 10 mg oral tablet: 1 tab(s) orally 2 times a day (27 Apr 2021 13:15)  Travatan Z 0.004% ophthalmic solution: 1 drop(s) in the right eye once a day (in the evening) (27 Apr 2021 13:15)  Vitamin D3 1000 intl units (25 mcg) oral tablet: 1 tab(s) orally once a day (27 Apr 2021 13:15)    MEDICATIONS  (STANDING):  apixaban 10 milliGRAM(s) Oral every 12 hours  cholecalciferol 1000 Unit(s) Oral daily  ferrous    sulfate 325 milliGRAM(s) Oral daily  latanoprost 0.005% Ophthalmic Solution 1 Drop(s) Right EYE at bedtime  metoprolol tartrate 25 milliGRAM(s) Oral two times a day  pantoprazole    Tablet 40 milliGRAM(s) Oral before breakfast  simvastatin 20 milliGRAM(s) Oral at bedtime    MEDICATIONS  (PRN):  acetaminophen   Tablet .. 650 milliGRAM(s) Oral every 6 hours PRN Mild Pain (1 - 3)  LORazepam     Tablet 1 milliGRAM(s) Oral two times a day PRN Anxiety  senna 2 Tablet(s) Oral at bedtime PRN Constipation  traMADol 50 milliGRAM(s) Oral every 12 hours PRN Moderate Pain (4 - 6)      Allergies    No Known Drug Allergies  Originally Entered as [Rash] reaction to [adhesive tape] (Unknown)    Intolerances        Vital Signs Last 24 Hrs  T(C): 37.1 (02 May 2021 21:53), Max: 37.1 (02 May 2021 21:53)  T(F): 98.8 (02 May 2021 21:53), Max: 98.8 (02 May 2021 21:53)  HR: 78 (02 May 2021 21:53) (78 - 78)  BP: 129/59 (02 May 2021 21:53) (129/59 - 139/55)  BP(mean): --  RR: 18 (02 May 2021 21:53) (17 - 18)  SpO2: 94% (02 May 2021 21:53) (94% - 96%)      PHYSICAL EXAMINATION:    NECK:  Supple. No lymphadenopathy. Jugular venous pressure not elevated. Carotids equal.   HEART:   The cardiac impulse has a normal quality. Reg., Nl S1 and S2.  There are no murmurs, rubs or gallops noted  CHEST:  Chest is clear to auscultation. Normal respiratory effort.  ABDOMEN:  Soft and nontender.   EXTREMITIES:  There is no edema.       LABS:                        9.4    8.43  )-----------( 161      ( 03 May 2021 07:27 )             28.3     05-02    137  |  106  |  22  ----------------------------<  125<H>  3.7   |  24  |  1.30    Ca    9.1      02 May 2021 08:32    TPro  5.9<L>  /  Alb  2.2<L>  /  TBili  0.6  /  DBili  x   /  AST  25  /  ALT  18  /  AlkPhos  58  05-02               Subjective:    pat better, lying in bed, discharge to rehab today.    Home Medications:  LORazepam 1 mg oral tablet: 1 tab(s) orally 2 times a day, As Needed (27 Apr 2021 13:15)  meloxicam 7.5 mg oral tablet: 1 tab(s) orally once a day, As Needed (27 Apr 2021 13:15)  Metoprolol Tartrate 25 mg oral tablet: 1 tab(s) orally 2 times a day (27 Apr 2021 13:15)  pantoprazole 40 mg oral delayed release tablet: 1 tab(s) orally once a day (27 Apr 2021 13:15)  potassium chloride 10 mEq oral tablet, extended release: 1 tab(s) orally 2 times a day (27 Apr 2021 13:15)  simvastatin 20 mg oral tablet: 1 tab(s) orally once a day (at bedtime) (27 Apr 2021 13:15)  tamoxifen 10 mg oral tablet: 1 tab(s) orally 2 times a day (27 Apr 2021 13:15)  Travatan Z 0.004% ophthalmic solution: 1 drop(s) in the right eye once a day (in the evening) (27 Apr 2021 13:15)  Vitamin D3 1000 intl units (25 mcg) oral tablet: 1 tab(s) orally once a day (27 Apr 2021 13:15)    MEDICATIONS  (STANDING):  apixaban 10 milliGRAM(s) Oral every 12 hours  cholecalciferol 1000 Unit(s) Oral daily  ferrous    sulfate 325 milliGRAM(s) Oral daily  latanoprost 0.005% Ophthalmic Solution 1 Drop(s) Right EYE at bedtime  metoprolol tartrate 25 milliGRAM(s) Oral two times a day  pantoprazole    Tablet 40 milliGRAM(s) Oral before breakfast  simvastatin 20 milliGRAM(s) Oral at bedtime    MEDICATIONS  (PRN):  acetaminophen   Tablet .. 650 milliGRAM(s) Oral every 6 hours PRN Mild Pain (1 - 3)  LORazepam     Tablet 1 milliGRAM(s) Oral two times a day PRN Anxiety  senna 2 Tablet(s) Oral at bedtime PRN Constipation  traMADol 50 milliGRAM(s) Oral every 12 hours PRN Moderate Pain (4 - 6)      Allergies    No Known Drug Allergies  Originally Entered as [Rash] reaction to [adhesive tape] (Unknown)    Intolerances        Vital Signs Last 24 Hrs  T(C): 37.1 (02 May 2021 21:53), Max: 37.1 (02 May 2021 21:53)  T(F): 98.8 (02 May 2021 21:53), Max: 98.8 (02 May 2021 21:53)  HR: 78 (02 May 2021 21:53) (78 - 78)  BP: 129/59 (02 May 2021 21:53) (129/59 - 139/55)  BP(mean): --  RR: 18 (02 May 2021 21:53) (17 - 18)  SpO2: 94% (02 May 2021 21:53) (94% - 96%)      PHYSICAL EXAMINATION:    NECK:  Supple. No lymphadenopathy. Jugular venous pressure not elevated. Carotids equal.   HEART:   The cardiac impulse has a normal quality. Reg., Nl S1 and S2.  There are no murmurs, rubs or gallops noted  CHEST:  Chest is clear to auscultation. Normal respiratory effort.  ABDOMEN:  Soft and nontender.   EXTREMITIES:  There is no edema.       LABS:                        9.4    8.43  )-----------( 161      ( 03 May 2021 07:27 )             28.3     05-02    137  |  106  |  22  ----------------------------<  125<H>  3.7   |  24  |  1.30    Ca    9.1      02 May 2021 08:32    TPro  5.9<L>  /  Alb  2.2<L>  /  TBili  0.6  /  DBili  x   /  AST  25  /  ALT  18  /  AlkPhos  58  05-02

## 2021-05-03 NOTE — PROGRESS NOTE ADULT - SUBJECTIVE AND OBJECTIVE BOX
89 y/o female with no significant PMHx presents to the ED c/o CP. Pt was at home with son when she started to feel weak and like she was going to pass out. Pt's son lowered her to the ground. No LOC. Pt was c/o CP after. Pt still with CP upon arrival. Denies SOB. No other complaints at this time. Adm for saddle PE not a candidate for tpa  Pt was doing well but started c/o left knee pain; hemarthrosis suspected due to the acute onset and lack of pain before coming to hospital.   5/1 - pain well controlled. no pleuritic cp. minimal SOb with exertion   5/2- pt. assessed at bedisde. Denies complaints of pain or discomfort.   5/3- Pt. assessed at bedside. Pt. c/o of b/l leg pain and feels too weak to walk. Otherwise pt. denies pain or discomfort.     Vital Signs Last 24 Hrs  T(C): 36.8 (03 May 2021 08:33), Max: 37.1 (02 May 2021 21:53)  T(F): 98.2 (03 May 2021 08:33), Max: 98.8 (02 May 2021 21:53)  HR: 75 (03 May 2021 08:33) (75 - 78)  BP: 143/51 (03 May 2021 08:33) (129/59 - 143/51)  BP(mean): --  RR: 18 (03 May 2021 08:33) (17 - 18)  SpO2: 94% (03 May 2021 08:33) (94% - 96%)    GEN: lying in bed, NAD  HEENT:   NC/AT, pupils equal and reactive, EOMI  CV:  +S1, +S2, RRR  RESP:   lungs clear to auscultation bilaterally, no wheeze, rales, rhonchi   BREAST:  not examined  GI:  abdomen soft, non-tender, non-distended, normoactive BS  RECTAL:  not examined  :  not examined  MSK:   normal muscle tone  EXT: left knee pain with tenderness behind knee.  left knee larger than right   NEURO:  AAOX3, no focal neurological deficits  SKIN:  no rashes               Labs reviewed:                      9.4    8.43  )-----------( 161      ( 03 May 2021 07:27 )             28.3     05-02    137  |  106  |  22  ----------------------------<  125<H>  3.7   |  24  |  1.30    Ca    9.1      02 May 2021 08:32    TPro  5.9<L>  /  Alb  2.2<L>  /  TBili  0.6  /  DBili  x   /  AST  25  /  ALT  18  /  AlkPhos  58  05-02        LIVER FUNCTIONS - ( 02 May 2021 08:32 )  Alb: 2.2 g/dL / Pro: 5.9 gm/dL / ALK PHOS: 58 U/L / ALT: 18 U/L / AST: 25 U/L / GGT: x             Images reviewed:    < from: CT Angio Chest PE Protocol w/ IV Cont (04.27.21 @ 11:22) >  IMPRESSION:  Massive pulmonary embolism and evidence of right heart strain.    Pleural-based opacity in the left lower lobe suspect for pulmonary infarction. CT follow-up to resolution is recommended to exclude the possibility of mass.    < end of copied text >  < from: Xray Knee 1 or 2 Views, Left (04.30.21 @ 13:33) >  IMPRESSION:  Large effusion, partially calcified.. Further evaluation with MRI recommended to differentiate hemarthrosis versus PVNS.    < end of copied text >  < from: US Duplex Venous Lower Ext Complete, Bilateral (04.28.21 @ 09:20) >  IMPRESSION:  Nonocclusive DVT venous thrombosis is noted within the LEFT femoral, popliteal and peroneal veins..    < end of copied text >  < from: TTE Echo Complete w/o Contrast w/ Doppler (04.28.21 @ 11:59) >  Impression     Summary     Fibrocalcific changes noted to the mitral valve leaflets with preserved   leaflet excursion.   EA reversal of the mitral inflow consistent with reduced compliance of the   left ventricle.   Trace mitral regurgitation is present.   Fibrocalcific changes noted to the Aortic valve leaflets with preserved   leaflet excursion.   Mild (1+) aortic regurgitation is present.   The ascending aorta appears to be mildly dilated.   The tricuspid valve leaflets are thin and pliable; valve motion is normal.   The left atrium appears normal.   The left ventricle size is normal, mild hypokinesis, and mildly reduced   function.   Estimated left ventricular ejection fraction is 45-50 %.   Normal appearing right atrium.   The right ventricle is normal in size. Endocardium not well visualized,   likely normal function.    < end of copied text >

## 2021-05-03 NOTE — PROGRESS NOTE ADULT - ATTENDING COMMENTS
patient seen and examined with PA student Angie June,  I  was physically present for the key portions of the evaluation and management (E/M) service provided.  I agree with the above history, physical, and plan which I have reviewed and edited where appropriate.  - pt hgb stable today  - plan for dc to rehab see dc summary for details  - eliquis dose 5mg bid starting 5/5

## 2021-05-03 NOTE — PROGRESS NOTE ADULT - ASSESSMENT
89F.  admitted 04/27/21.  presented to ED c/o CP.  sudden onset.      PROBLEMS;    Hypoxaemia  Saddle PE w/ R heart strain  NSTEMI     PLAN:    pulmonary stable-rehab placement  Eliquis 10mg bid  oob to chair  SUPPORTIVE CARE  D/W staff/ dr disla 89F.  admitted 04/27/21.  presented to ED c/o CP.  sudden onset.      PROBLEMS;    Hypoxaemia  Saddle PE w/ R heart strain  NSTEMI     PLAN:    pulmonary stable-rehab placement-discharge  Eliquis 10mg bid  oob to chair  SUPPORTIVE CARE  D/W staff/ dr disla

## 2021-05-05 PROBLEM — Z78.9 OTHER SPECIFIED HEALTH STATUS: Chronic | Status: ACTIVE | Noted: 2021-04-27

## 2021-05-07 DIAGNOSIS — M25.062 HEMARTHROSIS, LEFT KNEE: ICD-10-CM

## 2021-05-07 DIAGNOSIS — E78.5 HYPERLIPIDEMIA, UNSPECIFIED: ICD-10-CM

## 2021-05-07 DIAGNOSIS — M25.462 EFFUSION, LEFT KNEE: ICD-10-CM

## 2021-05-07 DIAGNOSIS — I26.92 SADDLE EMBOLUS OF PULMONARY ARTERY WITHOUT ACUTE COR PULMONALE: ICD-10-CM

## 2021-05-07 DIAGNOSIS — I10 ESSENTIAL (PRIMARY) HYPERTENSION: ICD-10-CM

## 2021-05-07 DIAGNOSIS — H40.9 UNSPECIFIED GLAUCOMA: ICD-10-CM

## 2021-05-07 DIAGNOSIS — Z85.038 PERSONAL HISTORY OF OTHER MALIGNANT NEOPLASM OF LARGE INTESTINE: ICD-10-CM

## 2021-05-07 DIAGNOSIS — I21.4 NON-ST ELEVATION (NSTEMI) MYOCARDIAL INFARCTION: ICD-10-CM

## 2021-05-07 DIAGNOSIS — R33.9 RETENTION OF URINE, UNSPECIFIED: ICD-10-CM

## 2021-05-07 DIAGNOSIS — Z66 DO NOT RESUSCITATE: ICD-10-CM

## 2021-05-07 DIAGNOSIS — I82.402 ACUTE EMBOLISM AND THROMBOSIS OF UNSPECIFIED DEEP VEINS OF LEFT LOWER EXTREMITY: ICD-10-CM

## 2021-05-07 DIAGNOSIS — Z85.43 PERSONAL HISTORY OF MALIGNANT NEOPLASM OF OVARY: ICD-10-CM

## 2021-05-12 ENCOUNTER — APPOINTMENT (OUTPATIENT)
Dept: CARDIOLOGY | Facility: CLINIC | Age: 87
End: 2021-05-12

## 2021-05-14 NOTE — ED PROVIDER NOTE - CHPI ED SYMPTOMS POS
Education provided on the pain management plan of care/Side effects of pain management treatment CHEST PAIN

## 2022-05-24 ENCOUNTER — EMERGENCY (EMERGENCY)
Facility: HOSPITAL | Age: 88
LOS: 0 days | Discharge: HOME | End: 2022-05-24
Attending: STUDENT IN AN ORGANIZED HEALTH CARE EDUCATION/TRAINING PROGRAM
Payer: MEDICARE

## 2022-05-24 VITALS
SYSTOLIC BLOOD PRESSURE: 165 MMHG | TEMPERATURE: 98 F | HEART RATE: 68 BPM | OXYGEN SATURATION: 97 % | RESPIRATION RATE: 18 BRPM | DIASTOLIC BLOOD PRESSURE: 78 MMHG

## 2022-05-24 VITALS
DIASTOLIC BLOOD PRESSURE: 68 MMHG | HEART RATE: 69 BPM | SYSTOLIC BLOOD PRESSURE: 182 MMHG | TEMPERATURE: 98 F | OXYGEN SATURATION: 97 % | RESPIRATION RATE: 18 BRPM | HEIGHT: 61 IN

## 2022-05-24 DIAGNOSIS — M54.9 DORSALGIA, UNSPECIFIED: ICD-10-CM

## 2022-05-24 DIAGNOSIS — Z20.822 CONTACT WITH AND (SUSPECTED) EXPOSURE TO COVID-19: ICD-10-CM

## 2022-05-24 DIAGNOSIS — N39.0 URINARY TRACT INFECTION, SITE NOT SPECIFIED: ICD-10-CM

## 2022-05-24 LAB
ALBUMIN SERPL ELPH-MCNC: 3 G/DL — LOW (ref 3.3–5)
ALP SERPL-CCNC: 103 U/L — SIGNIFICANT CHANGE UP (ref 40–120)
ALT FLD-CCNC: 16 U/L — SIGNIFICANT CHANGE UP (ref 12–78)
ANION GAP SERPL CALC-SCNC: 4 MMOL/L — LOW (ref 5–17)
APPEARANCE UR: CLEAR — SIGNIFICANT CHANGE UP
AST SERPL-CCNC: 22 U/L — SIGNIFICANT CHANGE UP (ref 15–37)
BASOPHILS # BLD AUTO: 0.04 K/UL — SIGNIFICANT CHANGE UP (ref 0–0.2)
BASOPHILS NFR BLD AUTO: 0.5 % — SIGNIFICANT CHANGE UP (ref 0–2)
BILIRUB SERPL-MCNC: 0.3 MG/DL — SIGNIFICANT CHANGE UP (ref 0.2–1.2)
BILIRUB UR-MCNC: NEGATIVE — SIGNIFICANT CHANGE UP
BUN SERPL-MCNC: 34 MG/DL — HIGH (ref 7–23)
CALCIUM SERPL-MCNC: 9.7 MG/DL — SIGNIFICANT CHANGE UP (ref 8.5–10.1)
CHLORIDE SERPL-SCNC: 109 MMOL/L — HIGH (ref 96–108)
CO2 SERPL-SCNC: 29 MMOL/L — SIGNIFICANT CHANGE UP (ref 22–31)
COLOR SPEC: YELLOW — SIGNIFICANT CHANGE UP
CREAT SERPL-MCNC: 1.68 MG/DL — HIGH (ref 0.5–1.3)
DIFF PNL FLD: ABNORMAL
EGFR: 28 ML/MIN/1.73M2 — LOW
EOSINOPHIL # BLD AUTO: 0.21 K/UL — SIGNIFICANT CHANGE UP (ref 0–0.5)
EOSINOPHIL NFR BLD AUTO: 2.9 % — SIGNIFICANT CHANGE UP (ref 0–6)
GLUCOSE SERPL-MCNC: 92 MG/DL — SIGNIFICANT CHANGE UP (ref 70–99)
GLUCOSE UR QL: NEGATIVE — SIGNIFICANT CHANGE UP
HCT VFR BLD CALC: 38.7 % — SIGNIFICANT CHANGE UP (ref 34.5–45)
HGB BLD-MCNC: 12.7 G/DL — SIGNIFICANT CHANGE UP (ref 11.5–15.5)
IMM GRANULOCYTES NFR BLD AUTO: 0.1 % — SIGNIFICANT CHANGE UP (ref 0–1.5)
KETONES UR-MCNC: NEGATIVE — SIGNIFICANT CHANGE UP
LEUKOCYTE ESTERASE UR-ACNC: ABNORMAL
LYMPHOCYTES # BLD AUTO: 2.33 K/UL — SIGNIFICANT CHANGE UP (ref 1–3.3)
LYMPHOCYTES # BLD AUTO: 31.8 % — SIGNIFICANT CHANGE UP (ref 13–44)
MCHC RBC-ENTMCNC: 32.5 PG — SIGNIFICANT CHANGE UP (ref 27–34)
MCHC RBC-ENTMCNC: 32.8 GM/DL — SIGNIFICANT CHANGE UP (ref 32–36)
MCV RBC AUTO: 99 FL — SIGNIFICANT CHANGE UP (ref 80–100)
MONOCYTES # BLD AUTO: 0.76 K/UL — SIGNIFICANT CHANGE UP (ref 0–0.9)
MONOCYTES NFR BLD AUTO: 10.4 % — SIGNIFICANT CHANGE UP (ref 2–14)
NEUTROPHILS # BLD AUTO: 3.98 K/UL — SIGNIFICANT CHANGE UP (ref 1.8–7.4)
NEUTROPHILS NFR BLD AUTO: 54.3 % — SIGNIFICANT CHANGE UP (ref 43–77)
NITRITE UR-MCNC: POSITIVE
PH UR: 5 — SIGNIFICANT CHANGE UP (ref 5–8)
PLATELET # BLD AUTO: 152 K/UL — SIGNIFICANT CHANGE UP (ref 150–400)
POTASSIUM SERPL-MCNC: 4.6 MMOL/L — SIGNIFICANT CHANGE UP (ref 3.5–5.3)
POTASSIUM SERPL-SCNC: 4.6 MMOL/L — SIGNIFICANT CHANGE UP (ref 3.5–5.3)
PROT SERPL-MCNC: 6.4 GM/DL — SIGNIFICANT CHANGE UP (ref 6–8.3)
PROT UR-MCNC: NEGATIVE — SIGNIFICANT CHANGE UP
RBC # BLD: 3.91 M/UL — SIGNIFICANT CHANGE UP (ref 3.8–5.2)
RBC # FLD: 13 % — SIGNIFICANT CHANGE UP (ref 10.3–14.5)
SODIUM SERPL-SCNC: 142 MMOL/L — SIGNIFICANT CHANGE UP (ref 135–145)
SP GR SPEC: 1.01 — SIGNIFICANT CHANGE UP (ref 1.01–1.02)
UROBILINOGEN FLD QL: NEGATIVE — SIGNIFICANT CHANGE UP
WBC # BLD: 7.33 K/UL — SIGNIFICANT CHANGE UP (ref 3.8–10.5)
WBC # FLD AUTO: 7.33 K/UL — SIGNIFICANT CHANGE UP (ref 3.8–10.5)

## 2022-05-24 PROCEDURE — 74176 CT ABD & PELVIS W/O CONTRAST: CPT | Mod: 26,MA

## 2022-05-24 PROCEDURE — U0005: CPT

## 2022-05-24 PROCEDURE — 87086 URINE CULTURE/COLONY COUNT: CPT

## 2022-05-24 PROCEDURE — 99284 EMERGENCY DEPT VISIT MOD MDM: CPT | Mod: 25

## 2022-05-24 PROCEDURE — 36415 COLL VENOUS BLD VENIPUNCTURE: CPT

## 2022-05-24 PROCEDURE — 87186 SC STD MICRODIL/AGAR DIL: CPT

## 2022-05-24 PROCEDURE — 99284 EMERGENCY DEPT VISIT MOD MDM: CPT

## 2022-05-24 PROCEDURE — 85025 COMPLETE CBC W/AUTO DIFF WBC: CPT

## 2022-05-24 PROCEDURE — 74176 CT ABD & PELVIS W/O CONTRAST: CPT | Mod: MA

## 2022-05-24 PROCEDURE — 80053 COMPREHEN METABOLIC PANEL: CPT

## 2022-05-24 PROCEDURE — U0003: CPT

## 2022-05-24 PROCEDURE — 81001 URINALYSIS AUTO W/SCOPE: CPT

## 2022-05-24 RX ORDER — CEPHALEXIN 500 MG
500 CAPSULE ORAL ONCE
Refills: 0 | Status: COMPLETED | OUTPATIENT
Start: 2022-05-24 | End: 2022-05-24

## 2022-05-24 RX ORDER — ACETAMINOPHEN 500 MG
650 TABLET ORAL ONCE
Refills: 0 | Status: COMPLETED | OUTPATIENT
Start: 2022-05-24 | End: 2022-05-24

## 2022-05-24 RX ORDER — CEPHALEXIN 500 MG
1 CAPSULE ORAL
Qty: 7 | Refills: 0
Start: 2022-05-24 | End: 2022-05-30

## 2022-05-24 RX ORDER — LIDOCAINE 4 G/100G
1 CREAM TOPICAL ONCE
Refills: 0 | Status: COMPLETED | OUTPATIENT
Start: 2022-05-24 | End: 2022-05-24

## 2022-05-24 RX ADMIN — LIDOCAINE 1 PATCH: 4 CREAM TOPICAL at 12:47

## 2022-05-24 RX ADMIN — Medication 650 MILLIGRAM(S): at 13:17

## 2022-05-24 RX ADMIN — Medication 500 MILLIGRAM(S): at 16:32

## 2022-05-24 RX ADMIN — Medication 650 MILLIGRAM(S): at 12:47

## 2022-05-24 NOTE — ED ADULT NURSE NOTE - NSIMPLEMENTINTERV_GEN_ALL_ED
Implemented All Fall with Harm Risk Interventions:  [place] to call system. Call bell, personal items and telephone within reach. Instruct patient to call for assistance. Room bathroom lighting operational. Non-slip footwear when patient is off stretcher. Physically safe environment: no spills, clutter or unnecessary equipment. Stretcher in lowest position, wheels locked, appropriate side rails in place. Provide visual cue, wrist band, yellow gown, etc. Monitor gait and stability. Monitor for mental status changes and reorient to person, place, and time. Review medications for side effects contributing to fall risk. Reinforce activity limits and safety measures with patient and family. Provide visual clues: red socks.

## 2022-05-24 NOTE — ED PROVIDER NOTE - PROGRESS NOTE DETAILS
Ariana WILHELM: Spoke to son- Yogesh- [phone]- aware of results; pending UA at this time; will come to pick patient up after UA has resulted; s/o to [facility]oe pending UA and re-eval UA positive, will tx with keflex. dc home. MD LUZMARIA

## 2022-05-24 NOTE — ED PROVIDER NOTE - NSFOLLOWUPINSTRUCTIONS_ED_ALL_ED_FT
Back Pain    WHAT YOU NEED TO KNOW:    Back pain is common. It can be caused by many conditions, such as arthritis or the breakdown of spinal discs. Your risk for back pain is increased by injuries, lack of activity, or repeated bending and twisting. You may feel sore or stiff on one or both sides of your back. The pain may spread to your buttocks or thighs.    DISCHARGE INSTRUCTIONS:    Return to the emergency department if:     You have pain, numbness, or weakness in one or both legs.      Your pain becomes so severe that you cannot walk.      You cannot control your urine or bowel movements.      You have severe back pain with chest pain.      You have severe back pain, nausea, and vomiting.      You have severe back pain that spreads to your side or genital area.    Contact your healthcare provider if:     You have back pain that does not get better with rest and pain medicine.      You have a fever.      You have pain that worsens when you are on your back or when you rest.      You have pain that worsens when you cough or sneeze.      You lose weight without trying.      You have questions or concerns about your condition or care.    Medicines:     NSAIDs help decrease swelling and pain. This medicine is available with or without a doctor's order. NSAIDs can cause stomach bleeding or kidney problems in certain people. If you take blood thinner medicine, always ask your healthcare provider if NSAIDs are safe for you. Always read the medicine label and follow directions.      Acetaminophen decreases pain and fever. It is available without a doctor's order. Ask how much to take and how often to take it. Follow directions. Read the labels of all other medicines you are using to see if they also contain acetaminophen, or ask your doctor or pharmacist. Acetaminophen can cause liver damage if not taken correctly. Do not use more than 4 grams (4,000 milligrams) total of acetaminophen in one day.       Muscle relaxers help decrease muscle spasms and back pain.      Prescription pain medicine may be given. Ask your healthcare provider how to take this medicine safely. Some prescription pain medicines contain acetaminophen. Do not take other medicines that contain acetaminophen without talking to your healthcare provider. Too much acetaminophen may cause liver damage. Prescription pain medicine may cause constipation. Ask your healthcare provider how to prevent or treat constipation.       Take your medicine as directed. Contact your healthcare provider if you think your medicine is not helping or if you have side effects. Tell him or her if you are allergic to any medicine. Keep a list of the medicines, vitamins, and herbs you take. Include the amounts, and when and why you take them. Bring the list or the pill bottles to follow-up visits. Carry your medicine list with you in case of an emergency.    How to manage your back pain:     Apply ice on your back for 15 to 20 minutes every hour or as directed. Use an ice pack, or put crushed ice in a plastic bag. Cover it with a towel before you apply it to your skin. Ice helps prevent tissue damage and decreases pain.      Apply heat on your back for 20 to 30 minutes every 2 hours for as many days as directed. Heat helps decrease pain and muscle spasms.      Stay active as much as you can without causing more pain. Bed rest could make your back pain worse. Avoid heavy lifting until your pain is gone.      Go to physical therapy as directed. A physical therapist can teach you exercises to help improve movement and strength, and to decrease pain.    Follow up with your healthcare provider in 2 weeks, or as directed: Write down your questions so you remember to ask them during your visits. Urinary Tract Infection, Adult  ImageA urinary tract infection (UTI) is an infection of any part of the urinary tract, which includes the kidneys, ureters, bladder, and urethra. These organs make, store, and get rid of urine in the body. UTI can be a bladder infection (cystitis) or kidney infection (pyelonephritis).    What are the causes?  This infection may be caused by fungi, viruses, or bacteria. Bacteria are the most common cause of UTIs. This condition can also be caused by repeated incomplete emptying of the bladder during urination.    What increases the risk?  This condition is more likely to develop if:    You ignore your need to urinate or hold urine for long periods of time.  You do not empty your bladder completely during urination.  You wipe back to front after urinating or having a bowel movement, if you are female.  You are uncircumcised, if you are male.  You are constipated.  You have a urinary catheter that stays in place (indwelling).  You have a weak defense (immune) system.  You have a medical condition that affects your bowels, kidneys, or bladder.  You have diabetes.  You take antibiotic medicines frequently or for long periods of time, and the antibiotics no longer work well against certain types of infections (antibiotic resistance).  You take medicines that irritate your urinary tract.  You are exposed to chemicals that irritate your urinary tract.  You are female.    What are the signs or symptoms?  Symptoms of this condition include:    Fever.  Frequent urination or passing small amounts of urine frequently.  Needing to urinate urgently.  Pain or burning with urination.  Urine that smells bad or unusual.  Cloudy urine.  Pain in the lower abdomen or back.  Trouble urinating.  Blood in the urine.  Vomiting or being less hungry than normal.  Diarrhea or abdominal pain.  Vaginal discharge, if you are female.    How is this diagnosed?  This condition is diagnosed with a medical history and physical exam. You will also need to provide a urine sample to test your urine. Other tests may be done, including:    Blood tests.  Sexually transmitted disease (STD) testing.    If you have had more than one UTI, a cystoscopy or imaging studies may be done to determine the cause of the infections.    How is this treated?  Treatment for this condition often includes a combination of two or more of the following:    Antibiotic medicine.  Other medicines to treat less common causes of UTI.  Over-the-counter medicines to treat pain.  Drinking enough water to stay hydrated.    Follow these instructions at home:  Take over-the-counter and prescription medicines only as told by your health care provider.  If you were prescribed an antibiotic, take it as told by your health care provider. Do not stop taking the antibiotic even if you start to feel better.  Avoid alcohol, caffeine, tea, and carbonated beverages. They can irritate your bladder.  Drink enough fluid to keep your urine clear or pale yellow.  Keep all follow-up visits as told by your health care provider. This is important.  ImageMake sure to:    Empty your bladder often and completely. Do not hold urine for long periods of time.  Empty your bladder before and after sex.  Wipe from front to back after a bowel movement if you are female. Use each tissue one time when you wipe.    Contact a health care provider if:  You have back pain.  You have a fever.  You feel nauseous or vomit.  Your symptoms do not get better after 3 days.  Your symptoms go away and then return.  Get help right away if:  You have severe back pain or lower abdominal pain.  You are vomiting and cannot keep down any medicines or water.  This information is not intended to replace advice given to you by your health care provider. Make sure you discuss any questions you have with your health care provider.               Back Pain    WHAT YOU NEED TO KNOW:    Back pain is common. It can be caused by many conditions, such as arthritis or the breakdown of spinal discs. Your risk for back pain is increased by injuries, lack of activity, or repeated bending and twisting. You may feel sore or stiff on one or both sides of your back. The pain may spread to your buttocks or thighs.    DISCHARGE INSTRUCTIONS:    Return to the emergency department if:     You have pain, numbness, or weakness in one or both legs.      Your pain becomes so severe that you cannot walk.      You cannot control your urine or bowel movements.      You have severe back pain with chest pain.      You have severe back pain, nausea, and vomiting.      You have severe back pain that spreads to your side or genital area.    Contact your healthcare provider if:     You have back pain that does not get better with rest and pain medicine.      You have a fever.      You have pain that worsens when you are on your back or when you rest.      You have pain that worsens when you cough or sneeze.      You lose weight without trying.      You have questions or concerns about your condition or care.    Medicines:     NSAIDs help decrease swelling and pain. This medicine is available with or without a doctor's order. NSAIDs can cause stomach bleeding or kidney problems in certain people. If you take blood thinner medicine, always ask your healthcare provider if NSAIDs are safe for you. Always read the medicine label and follow directions.      Acetaminophen decreases pain and fever. It is available without a doctor's order. Ask how much to take and how often to take it. Follow directions. Read the labels of all other medicines you are using to see if they also contain acetaminophen, or ask your doctor or pharmacist. Acetaminophen can cause liver damage if not taken correctly. Do not use more than 4 grams (4,000 milligrams) total of acetaminophen in one day.       Muscle relaxers help decrease muscle spasms and back pain.      Prescription pain medicine may be given. Ask your healthcare provider how to take this medicine safely. Some prescription pain medicines contain acetaminophen. Do not take other medicines that contain acetaminophen without talking to your healthcare provider. Too much acetaminophen may cause liver damage. Prescription pain medicine may cause constipation. Ask your healthcare provider how to prevent or treat constipation.       Take your medicine as directed. Contact your healthcare provider if you think your medicine is not helping or if you have side effects. Tell him or her if you are allergic to any medicine. Keep a list of the medicines, vitamins, and herbs you take. Include the amounts, and when and why you take them. Bring the list or the pill bottles to follow-up visits. Carry your medicine list with you in case of an emergency.    How to manage your back pain:     Apply ice on your back for 15 to 20 minutes every hour or as directed. Use an ice pack, or put crushed ice in a plastic bag. Cover it with a towel before you apply it to your skin. Ice helps prevent tissue damage and decreases pain.      Apply heat on your back for 20 to 30 minutes every 2 hours for as many days as directed. Heat helps decrease pain and muscle spasms.      Stay active as much as you can without causing more pain. Bed rest could make your back pain worse. Avoid heavy lifting until your pain is gone.      Go to physical therapy as directed. A physical therapist can teach you exercises to help improve movement and strength, and to decrease pain.    Follow up with your healthcare provider in 2 weeks, or as directed: Write down your questions so you remember to ask them during your visits.

## 2022-05-24 NOTE — ED PROVIDER NOTE - OBJECTIVE STATEMENT
89 yo female w/no pertinent PMH presents to the ED for lower back pain after waking up this morning. Denies any falls or trauma. States she sometimes gets back pain but no similar in past. No urinary/bowel incontinence, CP, SOB, urinary complaints, abdominal pain or N/V/D.

## 2022-05-24 NOTE — ED PROVIDER NOTE - CARE PLAN
1 Principal Discharge DX:	Back pain   Principal Discharge DX:	Back pain  Secondary Diagnosis:	Acute UTI

## 2022-05-24 NOTE — ED PROVIDER NOTE - PATIENT PORTAL LINK FT
You can access the FollowMyHealth Patient Portal offered by [facility] by registering at the following website: [email or web address]. By joining [employer]’s FollowMyHealth portal, you will also be able to view your health information using other applications (apps) compatible with our system.

## 2023-06-20 NOTE — PATIENT PROFILE ADULT - WILL THE PATIENT ACCEPT THE PFIZER COVID-19 VACCINE IF ELIGIBLE AND IT IS AVAILABLE?
E-scribed.
Neetu Kilgore is requesting a refill of Norco. Last filled 4/28. He did have a procedure 6/6. OARRS is consistent. Last appt 4/28. Next appt 8/30.
Pt notified
No

## 2023-11-09 NOTE — PHYSICAL THERAPY INITIAL EVALUATION ADULT - SITTING BALANCE: STATIC
Detail Level: Simple Price (Do Not Change): 0.00 Instructions: This plan will send the code FBSE to the PM system.  DO NOT or CHANGE the price. fair plus
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
## 2025-02-28 RX ORDER — AMOXICILLIN AND CLAVULANATE POTASSIUM 500; 125 MG/1; MG/1
1 TABLET, FILM COATED ORAL
Refills: 0 | DISCHARGE
Start: 2025-02-28 | End: 2025-03-10

## 2025-03-10 ENCOUNTER — INPATIENT (INPATIENT)
Facility: HOSPITAL | Age: 89
LOS: 0 days | Discharge: HOME | DRG: 176 | End: 2025-03-11
Attending: INTERNAL MEDICINE | Admitting: INTERNAL MEDICINE
Payer: MEDICARE

## 2025-03-10 VITALS
SYSTOLIC BLOOD PRESSURE: 117 MMHG | HEIGHT: 65 IN | RESPIRATION RATE: 19 BRPM | HEART RATE: 90 BPM | WEIGHT: 149.91 LBS | TEMPERATURE: 100 F | OXYGEN SATURATION: 91 % | DIASTOLIC BLOOD PRESSURE: 71 MMHG

## 2025-03-10 DIAGNOSIS — I26.99 OTHER PULMONARY EMBOLISM WITHOUT ACUTE COR PULMONALE: ICD-10-CM

## 2025-03-10 LAB
ADD ON TEST-SPECIMEN IN LAB: SIGNIFICANT CHANGE UP
ADD ON TEST-SPECIMEN IN LAB: SIGNIFICANT CHANGE UP
ALBUMIN SERPL ELPH-MCNC: 3.2 G/DL — LOW (ref 3.3–5)
ALP SERPL-CCNC: 75 U/L — SIGNIFICANT CHANGE UP (ref 40–120)
ALT FLD-CCNC: 12 U/L — SIGNIFICANT CHANGE UP (ref 12–78)
ANION GAP SERPL CALC-SCNC: 6 MMOL/L — SIGNIFICANT CHANGE UP (ref 5–17)
APPEARANCE UR: CLEAR — SIGNIFICANT CHANGE UP
APTT BLD: 27.2 SEC — SIGNIFICANT CHANGE UP (ref 24.5–35.6)
AST SERPL-CCNC: 15 U/L — SIGNIFICANT CHANGE UP (ref 15–37)
BASOPHILS # BLD AUTO: 0.04 K/UL — SIGNIFICANT CHANGE UP (ref 0–0.2)
BASOPHILS NFR BLD AUTO: 0.4 % — SIGNIFICANT CHANGE UP (ref 0–2)
BILIRUB SERPL-MCNC: 0.5 MG/DL — SIGNIFICANT CHANGE UP (ref 0.2–1.2)
BILIRUB UR-MCNC: NEGATIVE — SIGNIFICANT CHANGE UP
BLD GP AB SCN SERPL QL: SIGNIFICANT CHANGE UP
BUN SERPL-MCNC: 23 MG/DL — SIGNIFICANT CHANGE UP (ref 7–23)
CALCIUM SERPL-MCNC: 9.7 MG/DL — SIGNIFICANT CHANGE UP (ref 8.5–10.1)
CHLORIDE SERPL-SCNC: 109 MMOL/L — HIGH (ref 96–108)
CO2 SERPL-SCNC: 22 MMOL/L — SIGNIFICANT CHANGE UP (ref 22–31)
COLOR SPEC: SIGNIFICANT CHANGE UP
CREAT SERPL-MCNC: 1.6 MG/DL — HIGH (ref 0.5–1.3)
DIFF PNL FLD: ABNORMAL
EGFR: 29 ML/MIN/1.73M2 — LOW
EGFR: 29 ML/MIN/1.73M2 — LOW
EOSINOPHIL # BLD AUTO: 0.22 K/UL — SIGNIFICANT CHANGE UP (ref 0–0.5)
EOSINOPHIL NFR BLD AUTO: 2.3 % — SIGNIFICANT CHANGE UP (ref 0–6)
FLUAV AG NPH QL: SIGNIFICANT CHANGE UP
FLUBV AG NPH QL: SIGNIFICANT CHANGE UP
GLUCOSE SERPL-MCNC: 116 MG/DL — HIGH (ref 70–99)
GLUCOSE UR QL: NEGATIVE MG/DL — SIGNIFICANT CHANGE UP
HCT VFR BLD CALC: 42.1 % — SIGNIFICANT CHANGE UP (ref 34.5–45)
HGB BLD-MCNC: 13.8 G/DL — SIGNIFICANT CHANGE UP (ref 11.5–15.5)
IMM GRANULOCYTES # BLD AUTO: 0.03 K/UL — SIGNIFICANT CHANGE UP (ref 0–0.07)
IMM GRANULOCYTES NFR BLD AUTO: 0.3 % — SIGNIFICANT CHANGE UP (ref 0–0.9)
INR BLD: 1.15 RATIO — SIGNIFICANT CHANGE UP (ref 0.85–1.16)
KETONES UR-MCNC: ABNORMAL MG/DL
LACTATE SERPL-SCNC: 1.6 MMOL/L — SIGNIFICANT CHANGE UP (ref 0.7–2)
LEUKOCYTE ESTERASE UR-ACNC: ABNORMAL
LYMPHOCYTES # BLD AUTO: 2.05 K/UL — SIGNIFICANT CHANGE UP (ref 1–3.3)
LYMPHOCYTES NFR BLD AUTO: 21.7 % — SIGNIFICANT CHANGE UP (ref 13–44)
MCHC RBC-ENTMCNC: 32.8 G/DL — SIGNIFICANT CHANGE UP (ref 32–36)
MCHC RBC-ENTMCNC: 32.8 PG — SIGNIFICANT CHANGE UP (ref 27–34)
MCV RBC AUTO: 100 FL — SIGNIFICANT CHANGE UP (ref 80–100)
MONOCYTES # BLD AUTO: 0.72 K/UL — SIGNIFICANT CHANGE UP (ref 0–0.9)
MONOCYTES NFR BLD AUTO: 7.6 % — SIGNIFICANT CHANGE UP (ref 2–14)
NEUTROPHILS # BLD AUTO: 6.38 K/UL — SIGNIFICANT CHANGE UP (ref 1.8–7.4)
NEUTROPHILS NFR BLD AUTO: 67.7 % — SIGNIFICANT CHANGE UP (ref 43–77)
NITRITE UR-MCNC: NEGATIVE — SIGNIFICANT CHANGE UP
NRBC # BLD AUTO: 0 K/UL — SIGNIFICANT CHANGE UP (ref 0–0)
NRBC # FLD: 0 K/UL — SIGNIFICANT CHANGE UP (ref 0–0)
NRBC BLD AUTO-RTO: 0 /100 WBCS — SIGNIFICANT CHANGE UP (ref 0–0)
NT-PROBNP SERPL-SCNC: 468 PG/ML — HIGH (ref 0–450)
PH UR: 5.5 — SIGNIFICANT CHANGE UP (ref 5–8)
PLATELET # BLD AUTO: 146 K/UL — LOW (ref 150–400)
PMV BLD: 9.8 FL — SIGNIFICANT CHANGE UP (ref 7–13)
POTASSIUM SERPL-MCNC: 4.4 MMOL/L — SIGNIFICANT CHANGE UP (ref 3.5–5.3)
POTASSIUM SERPL-SCNC: 4.4 MMOL/L — SIGNIFICANT CHANGE UP (ref 3.5–5.3)
PROT SERPL-MCNC: 6.6 GM/DL — SIGNIFICANT CHANGE UP (ref 6–8.3)
PROT UR-MCNC: 30 MG/DL
PROTHROM AB SERPL-ACNC: 13.6 SEC — HIGH (ref 9.9–13.4)
RBC # BLD: 4.21 M/UL — SIGNIFICANT CHANGE UP (ref 3.8–5.2)
RBC # FLD: 13.5 % — SIGNIFICANT CHANGE UP (ref 10.3–14.5)
RSV RNA NPH QL NAA+NON-PROBE: SIGNIFICANT CHANGE UP
SARS-COV-2 RNA SPEC QL NAA+PROBE: SIGNIFICANT CHANGE UP
SODIUM SERPL-SCNC: 137 MMOL/L — SIGNIFICANT CHANGE UP (ref 135–145)
SP GR SPEC: >1.03 — HIGH (ref 1–1.03)
TROPONIN I, HIGH SENSITIVITY RESULT: 17.85 NG/L — SIGNIFICANT CHANGE UP
UROBILINOGEN FLD QL: 0.2 MG/DL — SIGNIFICANT CHANGE UP (ref 0.2–1)
WBC # BLD: 9.44 K/UL — SIGNIFICANT CHANGE UP (ref 3.8–10.5)
WBC # FLD AUTO: 9.44 K/UL — SIGNIFICANT CHANGE UP (ref 3.8–10.5)

## 2025-03-10 PROCEDURE — 85027 COMPLETE CBC AUTOMATED: CPT

## 2025-03-10 PROCEDURE — 71045 X-RAY EXAM CHEST 1 VIEW: CPT | Mod: 26

## 2025-03-10 PROCEDURE — 85025 COMPLETE CBC W/AUTO DIFF WBC: CPT

## 2025-03-10 PROCEDURE — 71275 CT ANGIOGRAPHY CHEST: CPT | Mod: 26

## 2025-03-10 PROCEDURE — 80053 COMPREHEN METABOLIC PANEL: CPT

## 2025-03-10 PROCEDURE — 85610 PROTHROMBIN TIME: CPT

## 2025-03-10 PROCEDURE — 70450 CT HEAD/BRAIN W/O DYE: CPT | Mod: 26

## 2025-03-10 PROCEDURE — 72125 CT NECK SPINE W/O DYE: CPT | Mod: 26

## 2025-03-10 PROCEDURE — 97162 PT EVAL MOD COMPLEX 30 MIN: CPT | Mod: GP

## 2025-03-10 PROCEDURE — 81001 URINALYSIS AUTO W/SCOPE: CPT

## 2025-03-10 PROCEDURE — 36415 COLL VENOUS BLD VENIPUNCTURE: CPT

## 2025-03-10 PROCEDURE — 74177 CT ABD & PELVIS W/CONTRAST: CPT | Mod: 26

## 2025-03-10 PROCEDURE — 93970 EXTREMITY STUDY: CPT

## 2025-03-10 PROCEDURE — 85730 THROMBOPLASTIN TIME PARTIAL: CPT

## 2025-03-10 PROCEDURE — 99285 EMERGENCY DEPT VISIT HI MDM: CPT

## 2025-03-10 PROCEDURE — 93010 ELECTROCARDIOGRAM REPORT: CPT

## 2025-03-10 PROCEDURE — 73502 X-RAY EXAM HIP UNI 2-3 VIEWS: CPT | Mod: 26,RT

## 2025-03-10 PROCEDURE — 73552 X-RAY EXAM OF FEMUR 2/>: CPT | Mod: 26,RT

## 2025-03-10 PROCEDURE — 93306 TTE W/DOPPLER COMPLETE: CPT

## 2025-03-10 PROCEDURE — 87086 URINE CULTURE/COLONY COUNT: CPT

## 2025-03-10 RX ORDER — ASPIRIN 325 MG
1 TABLET ORAL
Refills: 0 | DISCHARGE

## 2025-03-10 RX ORDER — HEPARIN SODIUM 1000 [USP'U]/ML
2500 INJECTION INTRAVENOUS; SUBCUTANEOUS EVERY 6 HOURS
Refills: 0 | Status: DISCONTINUED | OUTPATIENT
Start: 2025-03-10 | End: 2025-03-11

## 2025-03-10 RX ORDER — HEPARIN SODIUM 1000 [USP'U]/ML
INJECTION INTRAVENOUS; SUBCUTANEOUS
Qty: 25000 | Refills: 0 | Status: DISCONTINUED | OUTPATIENT
Start: 2025-03-10 | End: 2025-03-11

## 2025-03-10 RX ORDER — HEPARIN SODIUM 1000 [USP'U]/ML
5000 INJECTION INTRAVENOUS; SUBCUTANEOUS EVERY 6 HOURS
Refills: 0 | Status: DISCONTINUED | OUTPATIENT
Start: 2025-03-10 | End: 2025-03-11

## 2025-03-10 RX ORDER — HEPARIN SODIUM 1000 [USP'U]/ML
5000 INJECTION INTRAVENOUS; SUBCUTANEOUS ONCE
Refills: 0 | Status: COMPLETED | OUTPATIENT
Start: 2025-03-10 | End: 2025-03-10

## 2025-03-10 RX ORDER — CYST/ALA/Q10/PHOS.SER/DHA/BROC 100-20-50
2 POWDER (GRAM) ORAL
Refills: 0 | DISCHARGE

## 2025-03-10 RX ORDER — ACETAMINOPHEN 500 MG/5ML
1000 LIQUID (ML) ORAL ONCE
Refills: 0 | Status: COMPLETED | OUTPATIENT
Start: 2025-03-10 | End: 2025-03-10

## 2025-03-10 RX ADMIN — HEPARIN SODIUM 5000 UNIT(S): 1000 INJECTION INTRAVENOUS; SUBCUTANEOUS at 19:29

## 2025-03-10 RX ADMIN — Medication 400 MILLIGRAM(S): at 18:08

## 2025-03-10 RX ADMIN — HEPARIN SODIUM 1100 UNIT(S)/HR: 1000 INJECTION INTRAVENOUS; SUBCUTANEOUS at 19:30

## 2025-03-10 RX ADMIN — HEPARIN SODIUM 1100 UNIT(S)/HR: 1000 INJECTION INTRAVENOUS; SUBCUTANEOUS at 21:46
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
## 2025-03-10 NOTE — ED PROVIDER NOTE - ATTENDING CONTRIBUTION TO CARE
WDL I, Julienne Lane DO,  performed the initial face to face bedside interview with this patient regarding history of present illness, review of symptoms and relevant past medical, social and family history.  I completed an independent physical examination.  I was the initial provider who evaluated this patient. I have signed out the follow up of any pending tests (i.e. labs, radiological studies) to the resident.  I have communicated the patient’s plan of care and disposition with the resident.  The history, relevant review of systems, past medical and surgical history, medical decision making, and physical examination was documented by the scribe in my presence and I attest to the accuracy of the documentation.

## 2025-03-10 NOTE — ED PROVIDER NOTE - CLINICAL SUMMARY MEDICAL DECISION MAKING FREE TEXT BOX
89 y/o female with generalized weakness, now with fall. Screening EKG, CXR, CT, labs, urine, and reeval.

## 2025-03-10 NOTE — PATIENT PROFILE ADULT - FALL HARM RISK - HARM RISK INTERVENTIONS
Assistance with ambulation/Assistance OOB with selected safe patient handling equipment/Communicate Risk of Fall with Harm to all staff/Discuss with provider need for PT consult/Monitor gait and stability/Provide patient with walking aids - walker, cane, crutches/Reinforce activity limits and safety measures with patient and family/Tailored Fall Risk Interventions/Use of alarms - bed, chair and/or voice tab/Visual Cue: Yellow wristband and red socks/Bed in lowest position, wheels locked, appropriate side rails in place/Call bell, personal items and telephone in reach/Instruct patient to call for assistance before getting out of bed or chair/Non-slip footwear when patient is out of bed/[place] to call system/Physically safe environment - no spills, clutter or unnecessary equipment/Purposeful Proactive Rounding/Room/bathroom lighting operational, light cord in reach

## 2025-03-10 NOTE — ED ADULT NURSE NOTE - SUICIDE SCREENING QUESTION 2
[facility] Hospitalist History and Physical      Chief Complaint:      Right foot infection, nausea vomiting diarrhea      History Of Present Illness      Abdi Orr 55year old male with diabetes, hypertension, peripheral neuropathy, right second toe amputation complains of nausea vomiting and diarrhea that started yesterday. His vomiting is gone but he still has watery  stool without blood. He has had a hole in the bottom of his foot for over a year but yesterday when he took off his bandage he noticed the fourth toe on the right foot was red and swelling started to the right foot. He has felt chills and feverish although he did not take his temperature. He has felt short of breath for the past 2 days. He has had some lower abdominal pain that is mild. He denies chest pain cough and trouble urinating. He smokes a pack of cigarettes a month, denies marijuana and drugs and drinks about 10 alcoholic beverages a week. He lives with his mother. 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 EKG showed normal sinus rhythm PAC. Potassium was 3.3 and sodium 126. He was given vancomycin and cefepime. I was wearing a mask and the patient was wearing a mask.       Past Medical History  Past Medical History:   Diagnosis Date   â¢ Diabetes mellitus (CMS/[facility], [facility])    â¢ Essential (primary) hypertension    â¢ Neuropathy    â¢ Normocytic anemia 9/9/2021       Surgical History   AMPUTATION 2ND TOE RIGHT FOOT  Social History  Social History     Tobacco Use   â¢ Smoking status: Former Smoker     Packs/day: 0.50     Years: 10.00     Pack years: 5.00     Types: Cigarettes   â¢ Smokeless tobacco: Never Used   â¢ Tobacco comment: stopped 2 years ago   Substance Use Topics   â¢ Alcohol use: Not Currently   â¢ Drug use: Never       Family History    Family History   Problem Relation Age of Onset   â¢ Cancer, Esophageal Father        Review of Systems  Constitutional: Negative for fatigue   Skin: Negative for rash   HEENT: Negative for eye drainage,   Respiratory: Negative hemoptysis  Cardiovascular: Negative for indentation  Gastrointestinal: Negative for sulaiman colored stools  Genitourinary: Negative for hematuria  Extremities:  Negative tophi  Neurologic:  Negative for choreoform movements  Endocrine: Negative for  hirsuitism  Hematological: Negative petichiae  Psychiatric: Negative for suicidal ideation     Allergies  ALLERGIES:  Patient has no known allergies. Medications  (Not in a hospital admission)      Inpatient Medications  Current Facility-Administered Medications   Medication Dose Route Frequency Provider Last Rate Last Admin   â¢ vancomycin 1,500 mg in sodium chloride 0.9% 250 mL IVPB  1,500 mg Intravenous Once Adam Allen, DO       â¢ [START ON 5/21/2022] pneumococcal 20-valent vaccine (PREVNAR 20) injection 0.5 mL  0.5 mL Intramuscular Once Vance Jones MD         Current Outpatient Medications   Medication Sig Dispense Refill   â¢ NIFEdipine CC (ADALAT CC) 30 MG 24 hr tablet      â¢ gabapentin (NEURONTIN) 600 MG tablet Take 600 mg by mouth 3 times daily. â¢ ibuprofen (MOTRIN) 800 MG tablet      â¢ glipiZIDE (GLUCOTROL) 5 MG tablet Take 0.5 tablets by mouth 2 times daily (before meals). 30 tablet 0   â¢ lisinopril (ZESTRIL) 40 MG tablet Take 40 mg by mouth daily. Indications: High Blood Pressure Disorder     â¢ metformin (GLUCOPHAGE) 1000 MG tablet Take 1,000 mg by mouth 2 times daily (with meals). Indications: Type 2 Diabetes     â¢ HYDROcodone-acetaminophen (Norco) 5-325 MG per tablet Take 1 tablet by mouth every 8 hours as needed for Pain. 5 tablet 0   â¢ gabapentin (NEURONTIN) 400 MG capsule Take 1 capsule by mouth 3 times daily. Indications: Neuropathic Pain 90 capsule 0   â¢ NIFEdipine XL (PROCARDIA XL) 30 MG 24 hr tablet Take 1 tablet by mouth nightly.  30 tablet 0       In/Out  No intake or output data in the 24 hours ending "05/20/22 1945     Physical Exam  Visit Vitals  BP (!) 142/78   Pulse 89   Temp 98.3 Â°F (36.8 Â°C) (Oral)   Resp 20   Ht 5' 10"" (1.778 m)   Wt 102.1 kg (225 lb)   SpO2 98%   BMI 32.28 kg/mÂ²       Constitutional: ill appearing  Eyes: no icterus  HEENT: pinna normal, nontraumatic  Neck: no adenopathy, no neck stiffness  Respiratory: lungs clear no wheezes  Cardiovascular: s1 s2 no murmurs dorsalis pedis is palpable  Gastrointestinal: soft non tender positive bowel sounds, non distended  Musculoskeletal: no gouty tophi   Integument: Erythema and swelling with discharge right fourth toe seepage  Lymphatic: no adenopathy  Neurologic: moves all extremities,  decreased sensation in feet  Psychiatric: mood appropriate   Sepsis  focused exam perfusion normal                 Labs     Recent Results (from the past 24 hour(s))   Electrocardiogram 12-Lead    Collection Time: 05/20/22 12:35 PM   Result Value Ref Range    Ventricular Rate EKG/Min (BPM) 73     Atrial Rate (BPM) 73     IL-Interval (MSEC) 140     QRS-Interval (MSEC) 92     QT-Interval (MSEC) 412     QTc 454     P Axis (Degrees) 39     R Axis (Degrees) 35     T Axis (Degrees) 6     REPORT TEXT       Normal sinus rhythm  Normal ECG  When compared with ECG of  12-SEP-2021 15:49,  premature atrial complexes  are no longer  present  Confirmed by [facility] MD ([id number]) on 5/20/2022 2:10:30 PM     TROPONIN I, HIGH SENSITIVITY    Collection Time: 05/20/22 12:48 PM   Result Value Ref Range    Troponin I, High Sensitivity 12 <77 ng/L   CBC with Automated Differential (performable only)    Collection Time: 05/20/22 12:48 PM   Result Value Ref Range    WBC 9.3 4.2 - 11.0 K/mcL    RBC 4.84 4.50 - 5.90 mil/mcL    HGB 14.3 13.0 - 17.0 g/dL    HCT 39.8 39.0 - 51.0 %    MCV 82.2 78.0 - 100.0 fl    MCH 29.5 26.0 - 34.0 pg    MCHC 35.9 32.0 - 36.5 g/dL    RDW-CV 13.0 11.0 - 15.0 %    RDW-SD 38.5 (L) 39.0 - 50.0 fL     140 - 450 K/mcL    NRBC 0 <=0 /100 WBC    Neutrophil, Percent " 82 %    Lymphocytes, Percent 11 %    Mono, Percent 6 %    Eosinophils, Percent 1 %    Basophils, Percent 0 %    Immature Granulocytes 0 %    Absolute Neutrophils 7.5 1.8 - 7.7 K/mcL    Absolute Lymphocytes 1.0 1.0 - 4.0 K/mcL    Absolute Monocytes 0.6 0.3 - 0.9 K/mcL    Absolute Eosinophils  0.1 0.0 - 0.5 K/mcL    Absolute Basophils 0.0 0.0 - 0.3 K/mcL    Absolute Immmature Granulocytes 0.0 0.0 - 0.2 K/mcL   GLUCOSE, BEDSIDE - POINT OF CARE    Collection Time: 05/20/22 12:59 PM   Result Value Ref Range    GLUCOSE, BEDSIDE - POINT OF CARE 240 (H) 70 - 99 mg/dL   Comprehensive Metabolic Panel    Collection Time: 05/20/22  1:51 PM   Result Value Ref Range    Fasting Status      Sodium 126 (L) 135 - 145 mmol/L    Potassium 3.3 (L) 3.4 - 5.1 mmol/L    Chloride 91 (L) 97 - 110 mmol/L    Carbon Dioxide 28 21 - 32 mmol/L    Anion Gap 10 7 - 19 mmol/L    Glucose 252 (H) 70 - 99 mg/dL    BUN 21 (H) 6 - 20 mg/dL    Creatinine 0.88 0.67 - 1.17 mg/dL    Glomerular Filtration Rate >90 >=60    BUN/ Creatinine Ratio 24 7 - 25    Calcium 8.1 (L) 8.4 - 10.2 mg/dL    Bilirubin, Total 0.7 0.2 - 1.0 mg/dL    GOT/AST 19 <=37 Units/L    GPT/ALT 20 <64 Units/L    Alkaline Phosphatase 80 45 - 117 Units/L    Albumin 2.5 (L) 3.6 - 5.1 g/dL    Protein, Total 7.6 6.4 - 8.2 g/dL    Globulin 5.1 (H) 2.0 - 4.0 g/dL    A/G Ratio 0.5 (L) 1.0 - 2.4   COVID/Flu/RSV panel    Collection Time: 05/20/22  6:24 PM   Result Value Ref Range    Rapid SARS-COV-2 by PCR Not Detected Not Detected / Detected / Presumptive Positive / Inhibitors present    Influenza A by PCR Not Detected Not Detected    Influenza B by PCR Not Detected Not Detected    RSV BY PCR Not Detected Not Detected    Isolation Guidelines      Procedural Comment     Lactic Acid Venous With Reflex    Collection Time: 05/20/22  6:31 PM   Result Value Ref Range    Lactate, Venous 1.8 0.0 - 2.0 mmol/L        Imaging:  Radiology:   XR FOOT 2 VIEWS RIGHT   Final Result       1.   Status post amputation of the right 2nd toe. 2.  Large ulcer plantar to the 3rd MTP joint. 3.  Progression of osteoarthritis and spurring in the 1st metatarsal   phalangeal joint since September 2021. Electronically Signed by: Sami Silverio    Signed on: 5/20/2022 6:42 PM          XR CHEST PA OR AP 1 VIEW   Final Result       No active cardiopulmonary disease. Electronically Signed by: Martin Matamoros M.D. Signed on: 5/20/2022 12:57 PM            Admission EKG:   Encounter Date: 05/20/22   Electrocardiogram 12-Lead   Result Value    Ventricular Rate EKG/Min (BPM) 73    Atrial Rate (BPM) 73    LA-Interval (MSEC) 140    QRS-Interval (MSEC) 92    QT-Interval (MSEC) 412    QTc 454    P Axis (Degrees) 39    R Axis (Degrees) 35    T Axis (Degrees) 6    REPORT TEXT      Normal sinus rhythm  Normal ECG  When compared with ECG of  12-SEP-2021 15:49,  premature atrial complexes  are no longer  present  Confirmed by [address] ([id number]) on 5/20/2022 2:10:30 PM        Most Recent ECHO: No results found for this or any previous visit. Microbiology Results     None          Assessment/Plan:    44-year-old with diabetes, hypertension, neuropathy, right second toe amputation hospitalized for hyponatremia, hypokalemia due to gastroenteritis and diabetic foot infection    * Diabetic foot ulcer associated with type 2 diabetes mellitus (CMS/[facility])  Assessment & Plan  And cellulitis, IV vancomycin and cefepime, consult ID, consult podiatry, sliding scale insulin hold metformin    Hypokalemia  Assessment & Plan  Replace    Hyponatremia  Assessment & Plan  Hydrate    Gastroenteritis  Assessment & Plan  Hydrate    Dyslipidemia  Assessment & Plan  Check lipid    Neuropathy  Assessment & Plan  Due to diabetes type 2, gabapentin    Essential (primary) hypertension  Assessment & Plan  Lisinopril and nifedipine       I personally reviewed ecg and agree with interpretation.     The emergency room physician and I discussed different diagnoses and treatment methods. DVT prophylaxis - chemoprophylaxis    I discussed diagnosis , testing and results with patient. They have no further questions.          Primary Care Physician  Bogdan Phipps MD  sent copy        Code Status  FULL    Anastasiia Ross MD    [facility] Hospitalist  5/20/2022 7:45 PM Patient unable to complete

## 2025-03-10 NOTE — ED ADULT NURSE NOTE - OBJECTIVE STATEMENT
Pt coming in from home by EMS with c/o weakness for multiple weeks. Pt is A&OX2, GCS 14. Pt is a poor historian and no family noted at bedside. Pt placed onto monitor. pt has ostomy bag. Pt noted not to have a rectum with Resident Marek, rectal temp unable to be obtained, no pressure ulcers noted. Pt on prima fit. Pt placed onto monitor. Denies CP or SOB. Pt in gown and safety socks. Pt has new diaper on.

## 2025-03-10 NOTE — ED ADULT NURSE NOTE - NSFALLHARMRISKINTERV_ED_ALL_ED

## 2025-03-10 NOTE — ED PROVIDER NOTE - PROGRESS NOTE DETAILS
Ariana DO: heparin ordered; cts neg for traumatic injury s/p fall; endorsed to hospitalist for admission.

## 2025-03-10 NOTE — ED PROVIDER NOTE - CPE EDP GASTRO NORM
Observe hernia for now - if worsening pain - refer to general surgery but they won't typically do anything in pregnancy   normal...

## 2025-03-10 NOTE — ED PROVIDER NOTE - OBJECTIVE STATEMENT
89 y/o female with no pertinent PMHx presents to the ED c/o generalized weakness x2 weeks. Patient has been unsteady on her feet and fell today, no prolonged period on the ground. Denies chest pain or SOB but came to the hospital for evaluation.

## 2025-03-11 ENCOUNTER — TRANSCRIPTION ENCOUNTER (OUTPATIENT)
Age: 89
End: 2025-03-11

## 2025-03-11 ENCOUNTER — RESULT REVIEW (OUTPATIENT)
Age: 89
End: 2025-03-11

## 2025-03-11 ENCOUNTER — EMERGENCY (EMERGENCY)
Facility: HOSPITAL | Age: 89
LOS: 0 days | Discharge: HOME | End: 2025-03-12
Attending: STUDENT IN AN ORGANIZED HEALTH CARE EDUCATION/TRAINING PROGRAM
Payer: MEDICARE

## 2025-03-11 VITALS
SYSTOLIC BLOOD PRESSURE: 114 MMHG | WEIGHT: 179.9 LBS | RESPIRATION RATE: 16 BRPM | DIASTOLIC BLOOD PRESSURE: 80 MMHG | HEIGHT: 65 IN | OXYGEN SATURATION: 100 % | HEART RATE: 80 BPM | TEMPERATURE: 98 F

## 2025-03-11 VITALS
SYSTOLIC BLOOD PRESSURE: 126 MMHG | TEMPERATURE: 98 F | HEART RATE: 64 BPM | DIASTOLIC BLOOD PRESSURE: 52 MMHG | OXYGEN SATURATION: 97 % | RESPIRATION RATE: 18 BRPM

## 2025-03-11 DIAGNOSIS — Z01.89 ENCOUNTER FOR OTHER SPECIFIED SPECIAL EXAMINATIONS: ICD-10-CM

## 2025-03-11 DIAGNOSIS — Z79.01 LONG TERM (CURRENT) USE OF ANTICOAGULANTS: ICD-10-CM

## 2025-03-11 DIAGNOSIS — E78.5 HYPERLIPIDEMIA, UNSPECIFIED: ICD-10-CM

## 2025-03-11 DIAGNOSIS — Z85.40 PERSONAL HISTORY OF MALIGNANT NEOPLASM OF UNSPECIFIED FEMALE GENITAL ORGAN: ICD-10-CM

## 2025-03-11 DIAGNOSIS — I26.99 OTHER PULMONARY EMBOLISM WITHOUT ACUTE COR PULMONALE: ICD-10-CM

## 2025-03-11 DIAGNOSIS — Z86.711 PERSONAL HISTORY OF PULMONARY EMBOLISM: ICD-10-CM

## 2025-03-11 DIAGNOSIS — I10 ESSENTIAL (PRIMARY) HYPERTENSION: ICD-10-CM

## 2025-03-11 DIAGNOSIS — Z93.3 COLOSTOMY STATUS: ICD-10-CM

## 2025-03-11 DIAGNOSIS — Z90.710 ACQUIRED ABSENCE OF BOTH CERVIX AND UTERUS: ICD-10-CM

## 2025-03-11 DIAGNOSIS — Z86.718 PERSONAL HISTORY OF OTHER VENOUS THROMBOSIS AND EMBOLISM: ICD-10-CM

## 2025-03-11 DIAGNOSIS — H40.9 UNSPECIFIED GLAUCOMA: ICD-10-CM

## 2025-03-11 DIAGNOSIS — Z91.048 OTHER NONMEDICINAL SUBSTANCE ALLERGY STATUS: ICD-10-CM

## 2025-03-11 DIAGNOSIS — Z85.038 PERSONAL HISTORY OF OTHER MALIGNANT NEOPLASM OF LARGE INTESTINE: ICD-10-CM

## 2025-03-11 LAB
ALBUMIN SERPL ELPH-MCNC: 2.9 G/DL — LOW (ref 3.3–5)
ALP SERPL-CCNC: 65 U/L — SIGNIFICANT CHANGE UP (ref 40–120)
ALT FLD-CCNC: 12 U/L — SIGNIFICANT CHANGE UP (ref 12–78)
ANION GAP SERPL CALC-SCNC: 7 MMOL/L — SIGNIFICANT CHANGE UP (ref 5–17)
APTT BLD: 155.6 SEC — CRITICAL HIGH (ref 24.5–35.6)
APTT BLD: 168 SEC — CRITICAL HIGH (ref 24.5–35.6)
APTT BLD: > 200 SEC (ref 24.5–35.6)
AST SERPL-CCNC: 10 U/L — LOW (ref 15–37)
BASOPHILS # BLD AUTO: 0.05 K/UL — SIGNIFICANT CHANGE UP (ref 0–0.2)
BASOPHILS NFR BLD AUTO: 0.6 % — SIGNIFICANT CHANGE UP (ref 0–2)
BILIRUB SERPL-MCNC: 0.4 MG/DL — SIGNIFICANT CHANGE UP (ref 0.2–1.2)
BUN SERPL-MCNC: 21 MG/DL — SIGNIFICANT CHANGE UP (ref 7–23)
CALCIUM SERPL-MCNC: 9.2 MG/DL — SIGNIFICANT CHANGE UP (ref 8.5–10.1)
CHLORIDE SERPL-SCNC: 108 MMOL/L — SIGNIFICANT CHANGE UP (ref 96–108)
CO2 SERPL-SCNC: 24 MMOL/L — SIGNIFICANT CHANGE UP (ref 22–31)
CREAT SERPL-MCNC: 1.44 MG/DL — HIGH (ref 0.5–1.3)
EGFR: 33 ML/MIN/1.73M2 — LOW
EGFR: 33 ML/MIN/1.73M2 — LOW
EOSINOPHIL # BLD AUTO: 0.29 K/UL — SIGNIFICANT CHANGE UP (ref 0–0.5)
EOSINOPHIL NFR BLD AUTO: 3.6 % — SIGNIFICANT CHANGE UP (ref 0–6)
GLUCOSE SERPL-MCNC: 96 MG/DL — SIGNIFICANT CHANGE UP (ref 70–99)
HCT VFR BLD CALC: 38.3 % — SIGNIFICANT CHANGE UP (ref 34.5–45)
HCT VFR BLD CALC: 40.2 % — SIGNIFICANT CHANGE UP (ref 34.5–45)
HGB BLD-MCNC: 12.5 G/DL — SIGNIFICANT CHANGE UP (ref 11.5–15.5)
HGB BLD-MCNC: 13.2 G/DL — SIGNIFICANT CHANGE UP (ref 11.5–15.5)
IMM GRANULOCYTES # BLD AUTO: 0.02 K/UL — SIGNIFICANT CHANGE UP (ref 0–0.07)
IMM GRANULOCYTES NFR BLD AUTO: 0.2 % — SIGNIFICANT CHANGE UP (ref 0–0.9)
INR BLD: 1.25 RATIO — HIGH (ref 0.85–1.16)
LYMPHOCYTES # BLD AUTO: 1.75 K/UL — SIGNIFICANT CHANGE UP (ref 1–3.3)
LYMPHOCYTES NFR BLD AUTO: 21.7 % — SIGNIFICANT CHANGE UP (ref 13–44)
MCHC RBC-ENTMCNC: 32.6 G/DL — SIGNIFICANT CHANGE UP (ref 32–36)
MCHC RBC-ENTMCNC: 32.6 PG — SIGNIFICANT CHANGE UP (ref 27–34)
MCHC RBC-ENTMCNC: 32.8 G/DL — SIGNIFICANT CHANGE UP (ref 32–36)
MCHC RBC-ENTMCNC: 32.8 PG — SIGNIFICANT CHANGE UP (ref 27–34)
MCV RBC AUTO: 100 FL — SIGNIFICANT CHANGE UP (ref 80–100)
MCV RBC AUTO: 100 FL — SIGNIFICANT CHANGE UP (ref 80–100)
MONOCYTES # BLD AUTO: 0.85 K/UL — SIGNIFICANT CHANGE UP (ref 0–0.9)
MONOCYTES NFR BLD AUTO: 10.6 % — SIGNIFICANT CHANGE UP (ref 2–14)
NEUTROPHILS # BLD AUTO: 5.09 K/UL — SIGNIFICANT CHANGE UP (ref 1.8–7.4)
NEUTROPHILS NFR BLD AUTO: 63.3 % — SIGNIFICANT CHANGE UP (ref 43–77)
NRBC # BLD AUTO: 0 K/UL — SIGNIFICANT CHANGE UP (ref 0–0)
NRBC # BLD AUTO: 0 K/UL — SIGNIFICANT CHANGE UP (ref 0–0)
NRBC # FLD: 0 K/UL — SIGNIFICANT CHANGE UP (ref 0–0)
NRBC # FLD: 0 K/UL — SIGNIFICANT CHANGE UP (ref 0–0)
NRBC BLD AUTO-RTO: 0 /100 WBCS — SIGNIFICANT CHANGE UP (ref 0–0)
NRBC BLD AUTO-RTO: 0 /100 WBCS — SIGNIFICANT CHANGE UP (ref 0–0)
PLATELET # BLD AUTO: 140 K/UL — LOW (ref 150–400)
PLATELET # BLD AUTO: 152 K/UL — SIGNIFICANT CHANGE UP (ref 150–400)
PMV BLD: 10.2 FL — SIGNIFICANT CHANGE UP (ref 7–13)
PMV BLD: 9.8 FL — SIGNIFICANT CHANGE UP (ref 7–13)
POTASSIUM SERPL-MCNC: 3.6 MMOL/L — SIGNIFICANT CHANGE UP (ref 3.5–5.3)
POTASSIUM SERPL-SCNC: 3.6 MMOL/L — SIGNIFICANT CHANGE UP (ref 3.5–5.3)
PROT SERPL-MCNC: 5.9 GM/DL — LOW (ref 6–8.3)
PROTHROM AB SERPL-ACNC: 14.4 SEC — HIGH (ref 9.9–13.4)
RBC # BLD: 3.83 M/UL — SIGNIFICANT CHANGE UP (ref 3.8–5.2)
RBC # BLD: 4.02 M/UL — SIGNIFICANT CHANGE UP (ref 3.8–5.2)
RBC # FLD: 13.5 % — SIGNIFICANT CHANGE UP (ref 10.3–14.5)
RBC # FLD: 13.6 % — SIGNIFICANT CHANGE UP (ref 10.3–14.5)
SODIUM SERPL-SCNC: 139 MMOL/L — SIGNIFICANT CHANGE UP (ref 135–145)
WBC # BLD: 8.05 K/UL — SIGNIFICANT CHANGE UP (ref 3.8–10.5)
WBC # BLD: 8.75 K/UL — SIGNIFICANT CHANGE UP (ref 3.8–10.5)
WBC # FLD AUTO: 8.05 K/UL — SIGNIFICANT CHANGE UP (ref 3.8–10.5)
WBC # FLD AUTO: 8.75 K/UL — SIGNIFICANT CHANGE UP (ref 3.8–10.5)

## 2025-03-11 PROCEDURE — 93970 EXTREMITY STUDY: CPT | Mod: 26

## 2025-03-11 PROCEDURE — 99236 HOSP IP/OBS SAME DATE HI 85: CPT | Mod: FS

## 2025-03-11 PROCEDURE — 99233 SBSQ HOSP IP/OBS HIGH 50: CPT

## 2025-03-11 PROCEDURE — 93306 TTE W/DOPPLER COMPLETE: CPT | Mod: 26

## 2025-03-11 PROCEDURE — 99284 EMERGENCY DEPT VISIT MOD MDM: CPT

## 2025-03-11 RX ORDER — CYANOCOBALAMIN 1000 UG/ML
1 INJECTION INTRAMUSCULAR; SUBCUTANEOUS
Refills: 0 | DISCHARGE

## 2025-03-11 RX ORDER — APIXABAN 2.5 MG/1
2 TABLET, FILM COATED ORAL
Qty: 74 | Refills: 0
Start: 2025-03-11 | End: 2025-04-09

## 2025-03-11 RX ORDER — APIXABAN 2.5 MG/1
10 TABLET, FILM COATED ORAL ONCE
Refills: 0 | Status: COMPLETED | OUTPATIENT
Start: 2025-03-11 | End: 2025-03-11

## 2025-03-11 RX ORDER — ASPIRIN 325 MG
81 TABLET ORAL DAILY
Refills: 0 | Status: DISCONTINUED | OUTPATIENT
Start: 2025-03-11 | End: 2025-03-11

## 2025-03-11 RX ORDER — CYANOCOBALAMIN 1000 UG/ML
1000 INJECTION INTRAMUSCULAR; SUBCUTANEOUS DAILY
Refills: 0 | Status: DISCONTINUED | OUTPATIENT
Start: 2025-03-11 | End: 2025-03-11

## 2025-03-11 RX ORDER — APIXABAN 2.5 MG/1
10 TABLET, FILM COATED ORAL EVERY 12 HOURS
Refills: 0 | Status: DISCONTINUED | OUTPATIENT
Start: 2025-03-11 | End: 2025-03-11

## 2025-03-11 RX ORDER — ATORVASTATIN CALCIUM 80 MG/1
10 TABLET, FILM COATED ORAL AT BEDTIME
Refills: 0 | Status: DISCONTINUED | OUTPATIENT
Start: 2025-03-11 | End: 2025-03-11

## 2025-03-11 RX ORDER — ACETAMINOPHEN 500 MG/5ML
650 LIQUID (ML) ORAL EVERY 6 HOURS
Refills: 0 | Status: DISCONTINUED | OUTPATIENT
Start: 2025-03-11 | End: 2025-03-11

## 2025-03-11 RX ORDER — NETARSUDIL AND LATANOPROST OPHTHALMIC SOLUTION, 0.02%/0.005% .2; .05 MG/ML; MG/ML
1 SOLUTION/ DROPS OPHTHALMIC; TOPICAL
Refills: 0 | DISCHARGE

## 2025-03-11 RX ORDER — CYST/ALA/Q10/PHOS.SER/DHA/BROC 100-20-50
1 POWDER (GRAM) ORAL DAILY
Refills: 0 | Status: DISCONTINUED | OUTPATIENT
Start: 2025-03-11 | End: 2025-03-11

## 2025-03-11 RX ORDER — MAGNESIUM, ALUMINUM HYDROXIDE 200-200 MG
30 TABLET,CHEWABLE ORAL EVERY 4 HOURS
Refills: 0 | Status: DISCONTINUED | OUTPATIENT
Start: 2025-03-11 | End: 2025-03-11

## 2025-03-11 RX ORDER — METOPROLOL SUCCINATE 50 MG/1
12.5 TABLET, EXTENDED RELEASE ORAL
Refills: 0 | Status: DISCONTINUED | OUTPATIENT
Start: 2025-03-11 | End: 2025-03-11

## 2025-03-11 RX ADMIN — HEPARIN SODIUM 0 UNIT(S)/HR: 1000 INJECTION INTRAVENOUS; SUBCUTANEOUS at 09:21

## 2025-03-11 RX ADMIN — Medication 40 MILLIGRAM(S): at 06:13

## 2025-03-11 RX ADMIN — HEPARIN SODIUM 0 UNIT(S)/HR: 1000 INJECTION INTRAVENOUS; SUBCUTANEOUS at 02:26

## 2025-03-11 RX ADMIN — Medication 5000 UNIT(S): at 10:22

## 2025-03-11 RX ADMIN — Medication 81 MILLIGRAM(S): at 10:22

## 2025-03-11 RX ADMIN — CYANOCOBALAMIN 1000 MICROGRAM(S): 1000 INJECTION INTRAMUSCULAR; SUBCUTANEOUS at 10:22

## 2025-03-11 RX ADMIN — METOPROLOL SUCCINATE 12.5 MILLIGRAM(S): 50 TABLET, EXTENDED RELEASE ORAL at 10:22

## 2025-03-11 RX ADMIN — APIXABAN 10 MILLIGRAM(S): 2.5 TABLET, FILM COATED ORAL at 10:22

## 2025-03-11 RX ADMIN — Medication 1 TABLET(S): at 10:22

## 2025-03-11 RX ADMIN — HEPARIN SODIUM 900 UNIT(S)/HR: 1000 INJECTION INTRAVENOUS; SUBCUTANEOUS at 03:28

## 2025-03-11 NOTE — PHYSICAL THERAPY INITIAL EVALUATION ADULT - DIAGNOSIS, PT EVAL
CT:  ABAD segmental  PE., S/p Mechanical Fall CT:  ABAD segmental  PE., S/p Mechanical Fall, Rt side neck pain

## 2025-03-11 NOTE — ED PROVIDER NOTE - PATIENT PORTAL LINK FT
You can access the FollowMyHealth Patient Portal offered by [facility] by registering at the following website: [email or web address]. By joining [employer]’s FollowMyHealth portal, you will also be able to view your health information using other applications (apps) compatible with our system.

## 2025-03-11 NOTE — ED PROVIDER NOTE - PROGRESS NOTE DETAILS
The patient was signed out to me pending social work.  Patient has had normal oxygenation on room air in the emergency department oxygen is 95%.  She is in no distress sleeping comfortably.  Spoke with discharging hospitalist who states that patient does not need oxygen at home since she is not hypoxic here no need for oxygen no need for new workup as patient was just discharged yesterday and all medications were sent to the pharmacy will KATERINA Rahman M.D., Attending Physician

## 2025-03-11 NOTE — PHARMACOTHERAPY INTERVENTION NOTE - COMMENTS
Medication reconciliation completed.  Patient was unable to provide medication information, spoke to stefan Zuñiga ([phone]) and they provided current medication list; confirmed with Dr. [facility]. 
Patient is being discharged on Eliquis 10 mg BID x 7 days until 3/17/25 then Eliquis 5 mg BID starting 3/18/25 for pulmonary embolism. Confirmed with outpatient [facility] pharmacy, $28.08 copay and medication is in stock.     Spoke to patient's son, Yogesh, via telephone regarding indication for Eliquis, how to take this medication, and common side effects to expect versus when to seek medical help [e.g., hematuria, dark tarry stools, after a fall/MVA, etc.] Instructed patient's son that patient should avoid OTC NSAIDs while on Eliquis. [facility] [place] apixaban education sheet provided for patient/family.

## 2025-03-11 NOTE — DISCHARGE NOTE PROVIDER - HOSPITAL COURSE
PHYSICAL EXAM:    Daily Height in cm: 165.1 (10 Mar 2025 17:01)    Daily Weight in k.2 (10 Mar 2025 21:45)    Vital Signs Last 24 Hrs  T(C): 36.8 (11 Mar 2025 09:35), Max: 37.6 (10 Mar 2025 16:27)  T(F): 98.2 (11 Mar 2025 09:35), Max: 99.6 (10 Mar 2025 16:27)  HR: 59 (11 Mar 2025 09:35) (59 - 90)  BP: 131/55 (11 Mar 2025 09:35) (104/69 - 133/55)  BP(mean): 80 (10 Mar 2025 21:00) (80 - 80)  RR: 18 (11 Mar 2025 09:35) (18 - 25)  SpO2: 95% (11 Mar 2025 09:35) (91% - 100%)    Constitutional: Weak  appearing  HEENT: Atraumatic, JANAE, Normal, No congestion  Respiratory: Breath Sounds normal, no rhonchi/wheeze  Cardiovascular: N S1S2;   Gastrointestinal: Abdomen soft, non tender, Bowel Sounds present  Extremities: No edema, peripheral pulses present  Neurological: AAO x 3, no gross focal motor deficits  Skin: Non cellulitic, no rash, ulcers  Lymph Nodes: No lymphadenopathy noted  Back: No CVA tenderness   Musculoskeletal: non tender  Breasts: Deferred  Genitourinary: deferred  Rectal: Deferred      89 y/o F w/ PMH of PE, DVT, HTN, dyslipidemia, colonc ancer s/p partial colectomy w/ colostomy, ovarian Ca s/p ORA, glaucoma, p/w fall    *PE  *H/o colon cancer / ovarian cancer  *Mild thrombocytopenia   -CT:  ABAD segmental  PE. Eccentric filling defect within LLL pulmonary artery extending into segmental branches. Heart size is normal.  No pericardial effusion. Liver lesions and vertebral body lucencies.  -Patient started on heparin drip in ED; switch to eliquis 10 mg po q 12 hrs x 7 days and then 5 mg q 12 hrs   -Troponin negative  -Echo; no rv strain  -Heme/onc consult     *Fall  -PT consult   -Fall precautions     *CKD 4:   -Continue to monitor creatinine  -Avoid nephrotoxic agents  Cr 1.44; better than baseline      *CT w/ opacification of R greater than L mastoid air cells  -F/u outpatient ENT    *H/o HTN / dyslipidemia  -C/w home meds     d/c home  time spent 45 min   PHYSICAL EXAM:    Daily Height in cm: 165.1 (10 Mar 2025 17:01)    Daily Weight in k.2 (10 Mar 2025 21:45)    Vital Signs Last 24 Hrs  T(C): 36.8 (11 Mar 2025 09:35), Max: 37.6 (10 Mar 2025 16:27)  T(F): 98.2 (11 Mar 2025 09:35), Max: 99.6 (10 Mar 2025 16:27)  HR: 59 (11 Mar 2025 09:35) (59 - 90)  BP: 131/55 (11 Mar 2025 09:35) (104/69 - 133/55)  BP(mean): 80 (10 Mar 2025 21:00) (80 - 80)  RR: 18 (11 Mar 2025 09:35) (18 - 25)  SpO2: 95% (11 Mar 2025 09:35) (91% - 100%)    Constitutional: Weak  appearing  HEENT: Atraumatic, JANAE, Normal, No congestion  Respiratory: Breath Sounds normal, no rhonchi/wheeze  Cardiovascular: N S1S2;   Gastrointestinal: Abdomen soft, non tender, Bowel Sounds present  Extremities: No edema, peripheral pulses present  Neurological: AAO x 3, no gross focal motor deficits  Skin: Non cellulitic, no rash, ulcers  Lymph Nodes: No lymphadenopathy noted  Back: No CVA tenderness   Musculoskeletal: non tender  Breasts: Deferred  Genitourinary: deferred  Rectal: Deferred      89 y/o F w/ PMH of PE, DVT, HTN, dyslipidemia, colonc ancer s/p partial colectomy w/ colostomy, ovarian Ca s/p ORA, glaucoma, p/w fall    *PE  *H/o colon cancer / ovarian cancer  *Mild thrombocytopenia   -CT:  ABAD segmental  PE. Eccentric filling defect within LLL pulmonary artery extending into segmental branches. Heart size is normal.  No pericardial effusion. Liver lesions and vertebral body lucencies.  -Patient started on heparin drip in ED; switch to eliquis 10 mg po q 12 hrs x 7 days and then 5 mg q 12 hrs   -Troponin negative  -Echo; no rv strain  -Heme/onc consult   No DVT    *Fall  -PT consult   -Fall precautions     *CKD 4:   -Continue to monitor creatinine  -Avoid nephrotoxic agents  Cr 1.44; better than baseline      *CT w/ opacification of R greater than L mastoid air cells  -F/u outpatient ENT    *H/o HTN / dyslipidemia  -C/w home meds     d/c home  time spent 45 min

## 2025-03-11 NOTE — ED ADULT TRIAGE NOTE - CHIEF COMPLAINT QUOTE
Pt BIBEMS requesting  consult. Pt was recently discharged from  ED today with oxygen. When EMS arrived, son did not have oxygen concentrator. Pt brought back to  ED for oxygen referral from . AO x 1. Hx of dementia, PEs. Pt on 2L NC. Denies any medical complaints.

## 2025-03-11 NOTE — H&P ADULT - ASSESSMENT
89 y/o F w/ PMH of PE, DVT, HTN, dyslipidemia, colonc ancer s/p partial colectomy w/ colostomy, ovarian Ca s/p ORA, glaucoma, p/w fall    *PE  *H/o colon cancer / ovarian cancer  *Mild thrombocytopenia   -CT:  ABAD segmental  PE. Eccentric filling defect within LLL pulmonary artery extending into segmental branches. Heart size is normal.  No pericardial effusion. Liver lesions and vertebral body lucencies.  -Patient started on heparin drip in ED  -Troponin negative  -Echo  -Heme/onc consult     *Fall  -PT consult   -Fall precautions     *CKD?  -Continue to monitor creatinine  -Avoid nephrotoxic agents  -I/Os   -UA     *CT w/ opacification of R greater than L mastoid air cells  -F/u outpatient ENT    *H/o HTN / dyslipidemia  -C/w home meds     *DVT ppx  -Heparin drip    >75 mins required for admission

## 2025-03-11 NOTE — ED PROVIDER NOTE - CLINICAL SUMMARY MEDICAL DECISION MAKING FREE TEXT BOX
89 y/o F w/ PMH of PE, DVT, HTN, dyslipidemia, colon cancer s/p partial colectomy w/ colostomy, ovarian Ca s/p ORA, glaucoma recently admitted for PE and placed on eliquis. patient discharged home today and was on oxygen when she arrived at home. Per son danii, EMS started asking about equipment which he did not have so patient was brought back to ed. per son, was not told that patient needed oxygen. per chart review, no mention of patient being discharged on oxygen or prescribed oxygen.    vs wnl, nad    will keep patient on pulse ox, remove oxygen and assess for need  sw consult in the am

## 2025-03-11 NOTE — PROVIDER CONTACT NOTE (CRITICAL VALUE NOTIFICATION) - ASSESSMENT
Patient without any bleeding
You can access the FollowMyHealth Patient Portal offered by Health system by registering at the following website: [email or web address]. By joining [employer]’s FollowMyHealth portal, you will also be able to view your health information using other applications (apps) compatible with our system.

## 2025-03-11 NOTE — CONSULT NOTE ADULT - SUBJECTIVE AND OBJECTIVE BOX
HPI:    89 y/o F with a PMHx of prior PE / DVT?, HTN, HLD, prior colon Ca s/p partial colectomy with colostomy in place as well as prior ovarian Ca s/p ORA now presented to the ED s/p fall. Per notes, patient was woken up from sleep, stated she doesn't recall why she was brought to ED at this time and denied having any complaints.    03/11/25: Seen at bedside, no acute distress but overall poor historian        PAST MEDICAL & SURGICAL HISTORY:    Noted above          MEDICATIONS  (STANDING):    apixaban 10 milliGRAM(s) Oral every 12 hours  aspirin enteric coated 81 milliGRAM(s) Oral daily  atorvastatin 10 milliGRAM(s) Oral at bedtime  cholecalciferol 5000 Unit(s) Oral daily  cyanocobalamin 1000 MICROGram(s) Oral daily  metoprolol tartrate 12.5 milliGRAM(s) Oral two times a day  multivitamin/minerals 1 Tablet(s) Oral daily  pantoprazole    Tablet 40 milliGRAM(s) Oral before breakfast      MEDICATIONS  (PRN):    acetaminophen     Tablet .. 650 milliGRAM(s) Oral every 6 hours PRN Temp greater or equal to 38C (100.4F), Mild Pain (1 - 3)  aluminum hydroxide/magnesium hydroxide/simethicone Suspension 30 milliLiter(s) Oral every 4 hours PRN Dyspepsia  potassium chloride    Tablet ER 10 milliEquivalent(s) Oral two times a day PRN potassium <4      ALLERGIES:    No Known Drug Allergies  Originally Entered as [Rash] reaction to [adhesive tape] (Unknown)        FAMILY HISTORY:    Not noted      REVIEW OF SYSTEMS:    Unable to obtain reliable info        VITALS:    T(C): 36.8 (11 Mar 2025 09:35), Max: 37.6 (10 Mar 2025 16:27)  T(F): 98.2 (11 Mar 2025 09:35), Max: 99.6 (10 Mar 2025 16:27)  HR: 59 (11 Mar 2025 09:35) (59 - 90)  BP: 131/55 (11 Mar 2025 09:35) (104/69 - 133/55)  BP(mean): 80 (10 Mar 2025 21:00) (80 - 80)  RR: 18 (11 Mar 2025 09:35) (18 - 25)  SpO2: 95% (11 Mar 2025 09:35) (91% - 100%)    Parameters below as of 11 Mar 2025 09:35  Patient On (Oxygen Delivery Method): room air        PHYSICAL:    Constitutional: no acute distress, elderly  Eyes: no conjunctival infection, anicteric  ENT: pharynx is unremarkable  Neck: supple without JVD  Pulmonary: clear to auscultation bilaterally   Cardiac: RRR  Vascular: no calf tenderness, venous stasis changes  Abdomen: normoactive bowel sounds, soft and nontender; colostomy in place  Lymphatic: no peripheral adenopathy appreciated  Musculoskeletal: full range of motion and no deformities appreciated  Skin: normal appearance, no rash  Neurology: awake, alert, poorly oriented       LABS:    CBC Full  -  ( 11 Mar 2025 07:56 )  WBC Count : 8.05 K/uL  RBC Count : 3.83 M/uL  Hemoglobin : 12.5 g/dL  Hematocrit : 38.3 %  Platelet Count - Automated : 152 K/uL  Mean Cell Volume : 100.0 fl  Mean Cell Hemoglobin : 32.6 pg  Mean Cell Hemoglobin Concentration : 32.6 g/dL  Auto Neutrophil # : 5.09 K/uL  Auto Lymphocyte # : 1.75 K/uL  Auto Monocyte # : 0.85 K/uL  Auto Eosinophil # : 0.29 K/uL  Auto Basophil # : 0.05 K/uL  Auto Neutrophil % : 63.3 %  Auto Lymphocyte % : 21.7 %  Auto Monocyte % : 10.6 %  Auto Eosinophil % : 3.6 %  Auto Basophil % : 0.6 %    03-11    139  |  108  |  21  ----------------------------<  96  3.6   |  24  |  1.44[H]    Ca    9.2      11 Mar 2025 07:56    TPro  5.9[L]  /  Alb  2.9[L]  /  TBili  0.4  /  DBili  x   /  AST  10[L]  /  ALT  12  /  AlkPhos  65  03-11    PT/INR - ( 11 Mar 2025 07:56 )   PT: 14.4 sec;   INR: 1.25 ratio         PTT - ( 11 Mar 2025 09:34 )  PTT:155.6 sec  Urinalysis Basic - ( 11 Mar 2025 07:56 )    Color: x / Appearance: x / SG: x / pH: x  Gluc: 96 mg/dL / Ketone: x  / Bili: x / Urobili: x   Blood: x / Protein: x / Nitrite: x   Leuk Esterase: x / RBC: x / WBC x   Sq Epi: x / Non Sq Epi: x / Bacteria: x        RADIOLOGY & ADDITIONAL STUDIES:      CT Abdomen and Pelvis w/ IV Cont (03.10.25 @ 17:46)     FINDINGS:  CHEST:  LUNGS AND LARGE AIRWAYS: Patent central airways. No pulmonary nodules.  PLEURA: No pleural effusion.  VESSELS: Aberrant right subclavian artery. Eccentric noncalcified plaque   descending thoracic aorta. Left upper lobe segmental pulmonary embolus.   Eccentric filling defect within left lower lobe pulmonary artery   extending into segmental branches.  HEART: Heart size is normal.  No pericardial effusion.  MEDIASTINUM AND RASTA: No lymphadenopathy.  CHEST WALL AND LOWER NECK: Within normal limits.    ABDOMEN AND PELVIS:  LIVER: Cysts and other lesions too small to characterize.  BILE DUCTS: Normal caliber.  GALLBLADDER: Cholelithiasis.  SPLEEN: Within normal limits.  PANCREAS: Within normal limits.  ADRENALS: Within normal limits.  KIDNEYS/URETERS: Cysts and other lesions too small to characterize.    BLADDER: Within normal limits.  REPRODUCTIVE ORGANS: Hysterectomy. No adnexal mass.    BOWEL: No bowel obstruction. A rectosigmoid anastomosis and loop   colostomy.  PERITONEUM/RETROPERITONEUM: Within normal limits.  VESSELS:  Within normal limits.  LYMPH NODES: Within normal limits.  ABDOMINAL WALL: Within normal limits.  BONES: Within normal limits.    IMPRESSION: Left-sided pulmonary embolus some of which may be chronic.

## 2025-03-11 NOTE — PHYSICAL THERAPY INITIAL EVALUATION ADULT - GAIT TRAINING, PT EVAL
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  Functional Change: States she bought a bike that sits on the floor and has been pedaling 2 miles a day.  Pain / Symptoms:  Pain rating (out of 10): Current: 7     OBJECTIVE                                                                                                                        TREATMENT                                                                                                                  Therapeutic Exercise:  Aquatic therapy for core, low back, and  lower extremity strengthening, stretching and range of motion in the decreased pain environment of the water due to its unique properties/ buoyancy.  Ambulation:  In 3 ½ feet of water- 4 pool lengths of each with core setting.  Forwards, backwards, marching, side stepping.     Stretching:  Hamstring stretch with nerve floss- hold 15 sec x 3  Knee to chest stretch- hold 15 sec x 3  V stretch on pool wall - hold 20 sec x 3   Gastroc stretch - traditional and on the step- hold 20 sec x 2  Strengthening:  Hip flexion/ extension x 20B  Hip abduction/ adduction x 20B  Hip horizontal abduction/ adduction x 20B  Noodle LE pushdowns on small noodle x 20  Ambulation on underwater treadmill at 1.4 mph x 5 min.     Deep water:  Biking x5   min  Jumping jacks x 20  Knee tucks x 20  Hanging x 5 min       Skilled input: verbal instruction/cues, tactile instruction/cues and posture correction       ASSESSMENT                                                                                                              Patient has very little pain when she is in the deep water.  Will continue to strength progression as tolerated.  She did not rate pain level at end of session but reported less pain in deep water.  Patient Education:   Results of above outlined education: Verbalizes understanding      PLAN                                                                                                                           Suggestions for next session as indicated: Progress per plan of care         Therapy procedure time and total treatment time can be found documented on the Time Entry flowsheet   Goal 1 wk: min assx1 rw 15 feet

## 2025-03-11 NOTE — DISCHARGE NOTE NURSING/CASE MANAGEMENT/SOCIAL WORK - PATIENT PORTAL LINK FT
You can access the FollowMyHealth Patient Portal offered by [facility] by registering at the following website: [email or web address]. By joining [facility]’s FollowMyHealth portal, you will also be able to view your health information using other applications (apps) compatible with our system.

## 2025-03-11 NOTE — PHYSICAL THERAPY INITIAL EVALUATION ADULT - GENERAL OBSERVATIONS, REHAB EVAL
Pt was found lying in bed on tele, o2, primafit, Pt is pleasant and willing to participate in PT, Pt c/o Rt side neck pain

## 2025-03-11 NOTE — DISCHARGE NOTE PROVIDER - NSDCMRMEDTOKEN_GEN_ALL_CORE_FT
aspirin 81 mg oral delayed release tablet: 1 tab(s) orally once a day  Eliquis 5 mg oral tablet: 2 tab(s) orally every 12 hours 2 tabs orally every 12 hours  till 3/17/2025; then  1 tab orally every  12 hours  Eye Health Formula oral capsule: 2 cap(s) orally once a day  Metoprolol Tartrate 25 mg oral tablet: 0.5 tab(s) orally 2 times a day  pantoprazole 40 mg oral delayed release tablet: 1 tab(s) orally once a day  potassium chloride 10 mEq oral tablet, extended release: 1 tab(s) orally 2 times a day  Rocklatan 0.02%-0.005% ophthalmic solution: 1 drop(s) in the right eye once a day (in the evening)  simvastatin 20 mg oral tablet: 1 tab(s) orally once a day (at bedtime)  Vitamin B-12 1000 mcg oral tablet: 1 tab(s) orally once a day  Vitamin D3 125 mcg (5000 intl units) oral capsule: 1 cap(s) orally once a day

## 2025-03-11 NOTE — PHYSICAL THERAPY INITIAL EVALUATION ADULT - PERTINENT HX OF CURRENT PROBLEM, REHAB EVAL
89 y/o F w/ PMH of PE, DVT, HTN, dyslipidemia, colonc ancer s/p partial colectomy w/ colostomy, ovarian Ca s/p ORA, glaucoma, p/w fall. Patient was woken up from sleep, and states she doesn't recall why she was brought to ED at this time and denies having any complaints / pain at this time. As per chart, patient was brought in for a mechanical fall and was found to have a PE on CT.   CT Chest:  Left-sided pulmonary embolus some of which may be chronic.

## 2025-03-11 NOTE — ED PROVIDER NOTE - NSFOLLOWUPINSTRUCTIONS_ED_ALL_ED_FT
1. return for worsening symptoms or anything concerning to you  2. take all home meds as prescribed  3. follow up with your pmd call to make an appointment  4. If you cannot secure follow up with your PMD or specialist within 24 hours and you have non-emergent questions or concerns please call the [facility] ([phone]) in order to speak with an emergency physician open 24 hours/day, 7 days/week.

## 2025-03-11 NOTE — PROGRESS NOTE ADULT - ASSESSMENT
89 y/o F w/ PMH of PE, DVT, HTN, dyslipidemia, colonc ancer s/p partial colectomy w/ colostomy, ovarian Ca s/p ORA, glaucoma, p/w fall    *PE  *H/o colon cancer / ovarian cancer  *Mild thrombocytopenia   -CT:  ABAD segmental  PE. Eccentric filling defect within LLL pulmonary artery extending into segmental branches. Heart size is normal.  No pericardial effusion. Liver lesions and vertebral body lucencies.  -Patient started on heparin drip in ED; switch to eliquis 10 mg po q 12 hrs x 7 days and then 5 mg q 12 hrs   -Troponin negative  -Echo; no rv strain  -Heme/onc consult   No DVT    *Fall  -PT consult   -Fall precautions     *CKD 4:   -Continue to monitor creatinine  -Avoid nephrotoxic agents  Cr 1.44; better than baseline      *CT w/ opacification of R greater than L mastoid air cells  -F/u outpatient ENT    *H/o HTN / dyslipidemia  -C/w home meds     d/c home

## 2025-03-11 NOTE — DISCHARGE NOTE PROVIDER - NSDCCPCAREPLAN_GEN_ALL_CORE_FT
PRINCIPAL DISCHARGE DIAGNOSIS  Diagnosis: Left pulmonary embolus  Assessment and Plan of Treatment: eliquis as prescribed  f/u with PCP  check CBC in 3 days

## 2025-03-11 NOTE — ED PROVIDER NOTE - PHYSICAL EXAMINATION
Constitutional: NAD, alert, verbal  Cardiac: RRR no MRG  Resp: clear, no wheezing or crackles  GI: ab soft ntnd, no r/g  MSK/Ext: no edema

## 2025-03-11 NOTE — PROGRESS NOTE ADULT - SUBJECTIVE AND OBJECTIVE BOX
3/11: no complaints      PHYSICAL EXAM:    Daily Height in cm: 165.1 (10 Mar 2025 17:01)    Daily Weight in k.2 (10 Mar 2025 21:45)    Vital Signs Last 24 Hrs  T(C): 36.8 (11 Mar 2025 09:35), Max: 37.6 (10 Mar 2025 16:27)  T(F): 98.2 (11 Mar 2025 09:35), Max: 99.6 (10 Mar 2025 16:27)  HR: 59 (11 Mar 2025 09:35) (59 - 90)  BP: 131/55 (11 Mar 2025 09:35) (104/69 - 133/55)  BP(mean): 80 (10 Mar 2025 21:00) (80 - 80)  RR: 18 (11 Mar 2025 09:35) (18 - 25)  SpO2: 95% (11 Mar 2025 09:35) (91% - 100%)    Constitutional: Weak and ill appearing  HEENT: Atraumatic, JANAE,   Respiratory: Breath Sounds normal, no rhonchi/wheeze  Cardiovascular: N S1S2;   Gastrointestinal: Abdomen soft, non tender, Bowel Sounds present  Extremities: No edema, peripheral pulses present  Neurological: AAO x 3, no gross focal motor deficits  Skin: Non cellulitic, no rash, ulcers  Lymph Nodes: No lymphadenopathy noted  Back: No CVA tenderness   Musculoskeletal: non tender  Breasts: Deferred  Genitourinary: deferred  Rectal: Deferred    All Labs/EKG/Radiology/Meds reviewed by me                          12.5   8.05  )-----------( 152      ( 11 Mar 2025 07:56 )             38.3       CBC Full  -  ( 11 Mar 2025 07:56 )  WBC Count : 8.05 K/uL  RBC Count : 3.83 M/uL  Hemoglobin : 12.5 g/dL  Hematocrit : 38.3 %  Platelet Count - Automated : 152 K/uL  Mean Cell Volume : 100.0 fl  Mean Cell Hemoglobin : 32.6 pg  Mean Cell Hemoglobin Concentration : 32.6 g/dL  Auto Neutrophil # : 5.09 K/uL  Auto Lymphocyte # : 1.75 K/uL  Auto Monocyte # : 0.85 K/uL  Auto Eosinophil # : 0.29 K/uL  Auto Basophil # : 0.05 K/uL  Auto Neutrophil % : 63.3 %  Auto Lymphocyte % : 21.7 %  Auto Monocyte % : 10.6 %  Auto Eosinophil % : 3.6 %  Auto Basophil % : 0.6 %          139  |  108  |  21  ----------------------------<  96  3.6   |  24  |  1.44[H]    Ca    9.2      11 Mar 2025 07:56    TPro  5.9[L]  /  Alb  2.9[L]  /  TBili  0.4  /  DBili  x   /  AST  10[L]  /  ALT  12  /  AlkPhos  65  03-11      LIVER FUNCTIONS - ( 11 Mar 2025 07:56 )  Alb: 2.9 g/dL / Pro: 5.9 gm/dL / ALK PHOS: 65 U/L / ALT: 12 U/L / AST: 10 U/L / GGT: x             PT/INR - ( 11 Mar 2025 07:56 )   PT: 14.4 sec;   INR: 1.25 ratio         PTT - ( 11 Mar 2025 09:34 )  PTT:155.6 sec          Urinalysis Basic - ( 11 Mar 2025 07:56 )    Color: x / Appearance: x / SG: x / pH: x  Gluc: 96 mg/dL / Ketone: x  / Bili: x / Urobili: x   Blood: x / Protein: x / Nitrite: x   Leuk Esterase: x / RBC: x / WBC x   Sq Epi: x / Non Sq Epi: x / Bacteria: x            MEDICATIONS  (STANDING):  apixaban 10 milliGRAM(s) Oral every 12 hours  aspirin enteric coated 81 milliGRAM(s) Oral daily  atorvastatin 10 milliGRAM(s) Oral at bedtime  cholecalciferol 5000 Unit(s) Oral daily  cyanocobalamin 1000 MICROGram(s) Oral daily  metoprolol tartrate 12.5 milliGRAM(s) Oral two times a day  multivitamin/minerals 1 Tablet(s) Oral daily  pantoprazole    Tablet 40 milliGRAM(s) Oral before breakfast    MEDICATIONS  (PRN):  acetaminophen     Tablet .. 650 milliGRAM(s) Oral every 6 hours PRN Temp greater or equal to 38C (100.4F), Mild Pain (1 - 3)  aluminum hydroxide/magnesium hydroxide/simethicone Suspension 30 milliLiter(s) Oral every 4 hours PRN Dyspepsia  potassium chloride    Tablet ER 10 milliEquivalent(s) Oral two times a day PRN potassium <4

## 2025-03-11 NOTE — DISCHARGE NOTE NURSING/CASE MANAGEMENT/SOCIAL WORK - NSDCPEFALRISK_GEN_ALL_CORE
For information on Fall & Injury Prevention, visit: [email or web address] OR  [email or web address] OR  https://www.cdc.gov/steadi/patient.html

## 2025-03-11 NOTE — CONSULT NOTE ADULT - ASSESSMENT
89 y/o F with extensive PMhx including but not limited to prior colon and cervical Ca s/p both colectomy and ORA now admitted s/p fall, found to have L sided PE.      # L Sided PE    - S/p mechanical fall at home, brought to the ED, CT imaging revealed L sided PE some of which may be chronic   - Currently receiving Heparin gtt, transitioning to DOAC as of 03/11 AM  - CT imaging with no obvious evidence of mass / malignancy / BOLIVAR to raise concern for underlying insidious process contributing to VTE  - Overall etiology of VTE could be 2/2 trauma but overall unclear  - For now recommend therapeutic AC therapy but understanding risks of bleeding especially if patient experiences additional falls / trauma   - Continue supportive measures as in line with GOC  - Patient poor historian and will speak with family / HCP / son        Bladimir Smith PA-C  Hematology Oncology  [facility] [place]  [phone] 89 y/o F with extensive PMhx including but not limited to prior colon and cervical Ca, s/p both colectomy and ORA now admitted s/p fall, found to have L sided PE.      # L Sided PE    - S/p mechanical fall at home, brought to the ED, CT imaging revealed L sided PE some of which may be chronic   - Currently receiving Heparin gtt, transitioning to DOAC as of 03/11 AM  - CT imaging with no obvious evidence of mass / malignancy / BOLIVAR to raise concern for underlying insidious process contributing to VTE  - Overall etiology of VTE could be 2/2 trauma but overall unclear  - For now recommend therapeutic AC therapy but understanding risks of bleeding especially if patient experiences additional falls / trauma   - Continue supportive measures as in line with GOC  - Patient poor historian and will speak with family / HCP / son

## 2025-03-11 NOTE — H&P ADULT - HISTORY OF PRESENT ILLNESS
89 y/o F w/ PMH of PE, DVT, HTN, dyslipidemia, colonc ancer s/p partial colectomy w/ colostomy, ovarian Ca s/p ORA, glaucoma, p/w fall. Patient was woken up from sleep, and states she doesn't recall why she was brought to ED at this time and denies having any complaints / pain at this time. As per chart, patient was brought in for a mechanical fall and was found to have a PE on CT.     PSH: partial colectomy, ORA    Social Hx: Denies tobacco / etoh / drugs     Family Hx: Denies pertinent hx

## 2025-03-11 NOTE — DISCHARGE NOTE NURSING/CASE MANAGEMENT/SOCIAL WORK - FINANCIAL ASSISTANCE
[facility] provides services at a reduced cost to those who are determined to be eligible through [facility]’s financial assistance program. Information regarding [facility]’s financial assistance program can be found by going to [email or web address] or by calling [phone].

## 2025-03-11 NOTE — PHYSICAL THERAPY INITIAL EVALUATION ADULT - LEVEL OF INDEPENDENCE: SUPINE/SIT, REHAB EVAL
pt unable to fully sit at eob due to neck pain, holding her neck with her Rt hand, RN aware-gave heat packs for pain relief/moderate assist (50% patients effort)

## 2025-03-11 NOTE — H&P ADULT - CONVERSATION DETAILS
Patient states she's not sure about advance care directives. As per previous records, patient had a DNR / DNI. Called family, but no answer at this time. Will need to confirm in AM

## 2025-03-12 VITALS
RESPIRATION RATE: 17 BRPM | SYSTOLIC BLOOD PRESSURE: 124 MMHG | HEART RATE: 70 BPM | DIASTOLIC BLOOD PRESSURE: 59 MMHG | TEMPERATURE: 98 F | OXYGEN SATURATION: 96 %

## 2025-03-12 LAB
CULTURE RESULTS: NO GROWTH — SIGNIFICANT CHANGE UP
SPECIMEN SOURCE: SIGNIFICANT CHANGE UP

## 2025-03-12 RX ADMIN — APIXABAN 10 MILLIGRAM(S): 2.5 TABLET, FILM COATED ORAL at 00:20

## 2025-03-12 NOTE — ED ADULT NURSE REASSESSMENT NOTE - NS ED NURSE REASSESS COMMENT FT1
Pt care assumed from previous RN, Catalina MCGOVERN Pt resting in stretcher AOx1 at MS baseline as per previous RN and pt has been having normal oxygen saturations on ra. Pt has no complaints at this time. Pt O2 saturation 95% on ra. Safety and comfort maintained.

## 2025-03-12 NOTE — ED ADULT NURSE NOTE - OBJECTIVE STATEMENT
Pt BIBEMS requesting SW consult. Pt was recently discharged from  ED today with oxygen. per EMS,  son did not have oxygen concentrator. Pt brought back to  ED for SW and CM consult . AO x 1. Hx of dementia, PEs. Pt on 2L NC. Denies any medical complaints.

## 2025-03-12 NOTE — ED ADULT NURSE NOTE - CINV DISCH TEACH PARTICIP
I made patient aware of recommendation (that she go back to ER)  She refuses, said they did absolutely NOTHING for her there, and was treated in a ac way  She insisted on keeping the virtual visit for tomorrow 5/3/22  Patient

## 2025-03-16 LAB
CULTURE RESULTS: SIGNIFICANT CHANGE UP
CULTURE RESULTS: SIGNIFICANT CHANGE UP
SPECIMEN SOURCE: SIGNIFICANT CHANGE UP
SPECIMEN SOURCE: SIGNIFICANT CHANGE UP

## 2025-03-19 DIAGNOSIS — I26.99 OTHER PULMONARY EMBOLISM WITHOUT ACUTE COR PULMONALE: ICD-10-CM

## 2025-03-19 DIAGNOSIS — N18.4 CHRONIC KIDNEY DISEASE, STAGE 4 (SEVERE): ICD-10-CM

## 2025-03-19 DIAGNOSIS — D69.6 THROMBOCYTOPENIA, UNSPECIFIED: ICD-10-CM

## 2025-03-19 DIAGNOSIS — R53.1 WEAKNESS: ICD-10-CM

## 2025-03-19 DIAGNOSIS — Z90.49 ACQUIRED ABSENCE OF OTHER SPECIFIED PARTS OF DIGESTIVE TRACT: ICD-10-CM

## 2025-03-19 DIAGNOSIS — Z86.718 PERSONAL HISTORY OF OTHER VENOUS THROMBOSIS AND EMBOLISM: ICD-10-CM

## 2025-03-19 DIAGNOSIS — Z79.01 LONG TERM (CURRENT) USE OF ANTICOAGULANTS: ICD-10-CM

## 2025-03-19 DIAGNOSIS — Z85.09 PERSONAL HISTORY OF MALIGNANT NEOPLASM OF OTHER DIGESTIVE ORGANS: ICD-10-CM

## 2025-03-19 DIAGNOSIS — I12.9 HYPERTENSIVE CHRONIC KIDNEY DISEASE WITH STAGE 1 THROUGH STAGE 4 CHRONIC KIDNEY DISEASE, OR UNSPECIFIED CHRONIC KIDNEY DISEASE: ICD-10-CM

## 2025-03-19 DIAGNOSIS — E78.5 HYPERLIPIDEMIA, UNSPECIFIED: ICD-10-CM

## 2025-03-19 DIAGNOSIS — I44.0 ATRIOVENTRICULAR BLOCK, FIRST DEGREE: ICD-10-CM

## 2025-03-19 DIAGNOSIS — Z86.711 PERSONAL HISTORY OF PULMONARY EMBOLISM: ICD-10-CM

## 2025-04-10 ENCOUNTER — INPATIENT (INPATIENT)
Facility: HOSPITAL | Age: 89
LOS: 5 days | Discharge: HOSPICE | DRG: 871 | End: 2025-04-16
Attending: INTERNAL MEDICINE | Admitting: STUDENT IN AN ORGANIZED HEALTH CARE EDUCATION/TRAINING PROGRAM
Payer: MEDICARE

## 2025-04-10 VITALS
OXYGEN SATURATION: 100 % | HEIGHT: 65 IN | DIASTOLIC BLOOD PRESSURE: 29 MMHG | HEART RATE: 115 BPM | SYSTOLIC BLOOD PRESSURE: 83 MMHG | RESPIRATION RATE: 31 BRPM

## 2025-04-10 DIAGNOSIS — A41.9 SEPSIS, UNSPECIFIED ORGANISM: ICD-10-CM

## 2025-04-10 LAB
ALBUMIN SERPL ELPH-MCNC: 2 G/DL — LOW (ref 3.3–5)
ALBUMIN SERPL ELPH-MCNC: 2.1 G/DL — LOW (ref 3.3–5)
ALP SERPL-CCNC: 112 U/L — SIGNIFICANT CHANGE UP (ref 40–120)
ALP SERPL-CCNC: 95 U/L — SIGNIFICANT CHANGE UP (ref 40–120)
ALT FLD-CCNC: 31 U/L — SIGNIFICANT CHANGE UP (ref 12–78)
ALT FLD-CCNC: 44 U/L — SIGNIFICANT CHANGE UP (ref 12–78)
ANION GAP SERPL CALC-SCNC: 10 MMOL/L — SIGNIFICANT CHANGE UP (ref 5–17)
ANION GAP SERPL CALC-SCNC: 11 MMOL/L — SIGNIFICANT CHANGE UP (ref 5–17)
APPEARANCE UR: ABNORMAL
APTT BLD: 31.3 SEC — SIGNIFICANT CHANGE UP (ref 24.5–35.6)
APTT BLD: 33.1 SEC — SIGNIFICANT CHANGE UP (ref 24.5–35.6)
AST SERPL-CCNC: 17 U/L — SIGNIFICANT CHANGE UP (ref 15–37)
AST SERPL-CCNC: 36 U/L — SIGNIFICANT CHANGE UP (ref 15–37)
BACTERIA # UR AUTO: ABNORMAL /HPF
BASE EXCESS BLDA CALC-SCNC: -18 MMOL/L — LOW (ref -2–3)
BASE EXCESS BLDV CALC-SCNC: -18.3 MMOL/L — LOW (ref -2–3)
BASOPHILS # BLD AUTO: 0.03 K/UL — SIGNIFICANT CHANGE UP (ref 0–0.2)
BASOPHILS NFR BLD AUTO: 0.2 % — SIGNIFICANT CHANGE UP (ref 0–2)
BILIRUB SERPL-MCNC: 0.4 MG/DL — SIGNIFICANT CHANGE UP (ref 0.2–1.2)
BILIRUB SERPL-MCNC: 0.4 MG/DL — SIGNIFICANT CHANGE UP (ref 0.2–1.2)
BILIRUB UR-MCNC: SIGNIFICANT CHANGE UP
BLOOD GAS COMMENTS ARTERIAL: SIGNIFICANT CHANGE UP
BUN SERPL-MCNC: 91 MG/DL — HIGH (ref 7–23)
BUN SERPL-MCNC: 92 MG/DL — HIGH (ref 7–23)
CALCIUM SERPL-MCNC: 8.9 MG/DL — SIGNIFICANT CHANGE UP (ref 8.5–10.1)
CALCIUM SERPL-MCNC: 9 MG/DL — SIGNIFICANT CHANGE UP (ref 8.5–10.1)
CHLORIDE SERPL-SCNC: 118 MMOL/L — HIGH (ref 96–108)
CHLORIDE SERPL-SCNC: 120 MMOL/L — HIGH (ref 96–108)
CO2 SERPL-SCNC: 10 MMOL/L — CRITICAL LOW (ref 22–31)
CO2 SERPL-SCNC: 9 MMOL/L — CRITICAL LOW (ref 22–31)
COLOR SPEC: SIGNIFICANT CHANGE UP
COMMENT - URINE: SIGNIFICANT CHANGE UP
CREAT SERPL-MCNC: 2.64 MG/DL — HIGH (ref 0.5–1.3)
CREAT SERPL-MCNC: 2.77 MG/DL — HIGH (ref 0.5–1.3)
DIFF PNL FLD: ABNORMAL
EGFR: 15 ML/MIN/1.73M2 — LOW
EGFR: 15 ML/MIN/1.73M2 — LOW
EGFR: 16 ML/MIN/1.73M2 — LOW
EGFR: 16 ML/MIN/1.73M2 — LOW
EOSINOPHIL # BLD AUTO: 0 K/UL — SIGNIFICANT CHANGE UP (ref 0–0.5)
EOSINOPHIL NFR BLD AUTO: 0 % — SIGNIFICANT CHANGE UP (ref 0–6)
FLUAV AG NPH QL: SIGNIFICANT CHANGE UP
FLUBV AG NPH QL: SIGNIFICANT CHANGE UP
GLUCOSE SERPL-MCNC: 139 MG/DL — HIGH (ref 70–99)
GLUCOSE SERPL-MCNC: 143 MG/DL — HIGH (ref 70–99)
GLUCOSE UR QL: NEGATIVE MG/DL — SIGNIFICANT CHANGE UP
HCO3 BLDA-SCNC: 9 MMOL/L — CRITICAL LOW (ref 21–28)
HCO3 BLDV-SCNC: 12 MMOL/L — LOW (ref 22–29)
HCT VFR BLD CALC: 40.3 % — SIGNIFICANT CHANGE UP (ref 34.5–45)
HCT VFR BLD CALC: 46.1 % — HIGH (ref 34.5–45)
HGB BLD-MCNC: 13.1 G/DL — SIGNIFICANT CHANGE UP (ref 11.5–15.5)
HGB BLD-MCNC: 14.9 G/DL — SIGNIFICANT CHANGE UP (ref 11.5–15.5)
IMM GRANULOCYTES # BLD AUTO: 0.09 K/UL — HIGH (ref 0–0.07)
IMM GRANULOCYTES NFR BLD AUTO: 0.5 % — SIGNIFICANT CHANGE UP (ref 0–0.9)
INR BLD: 1.91 RATIO — HIGH (ref 0.85–1.16)
INR BLD: 2.13 RATIO — HIGH (ref 0.85–1.16)
KETONES UR-MCNC: ABNORMAL MG/DL
LACTATE SERPL-SCNC: 2 MMOL/L — SIGNIFICANT CHANGE UP (ref 0.7–2)
LACTATE SERPL-SCNC: 2.5 MMOL/L — HIGH (ref 0.7–2)
LEUKOCYTE ESTERASE UR-ACNC: ABNORMAL
LYMPHOCYTES # BLD AUTO: 3.02 K/UL — SIGNIFICANT CHANGE UP (ref 1–3.3)
LYMPHOCYTES NFR BLD AUTO: 16.2 % — SIGNIFICANT CHANGE UP (ref 13–44)
MAGNESIUM SERPL-MCNC: 2.1 MG/DL — SIGNIFICANT CHANGE UP (ref 1.6–2.6)
MAGNESIUM SERPL-MCNC: 2.2 MG/DL — SIGNIFICANT CHANGE UP (ref 1.6–2.6)
MCHC RBC-ENTMCNC: 32 PG — SIGNIFICANT CHANGE UP (ref 27–34)
MCHC RBC-ENTMCNC: 32.3 G/DL — SIGNIFICANT CHANGE UP (ref 32–36)
MCHC RBC-ENTMCNC: 32.5 G/DL — SIGNIFICANT CHANGE UP (ref 32–36)
MCHC RBC-ENTMCNC: 33.1 PG — SIGNIFICANT CHANGE UP (ref 27–34)
MCV RBC AUTO: 101.8 FL — HIGH (ref 80–100)
MCV RBC AUTO: 99.1 FL — SIGNIFICANT CHANGE UP (ref 80–100)
MONOCYTES # BLD AUTO: 1.04 K/UL — HIGH (ref 0–0.9)
MONOCYTES NFR BLD AUTO: 5.6 % — SIGNIFICANT CHANGE UP (ref 2–14)
NEUTROPHILS # BLD AUTO: 14.47 K/UL — HIGH (ref 1.8–7.4)
NEUTROPHILS NFR BLD AUTO: 77.5 % — HIGH (ref 43–77)
NITRITE UR-MCNC: POSITIVE
NRBC # BLD AUTO: 0.03 K/UL — HIGH (ref 0–0)
NRBC # BLD AUTO: 0.03 K/UL — HIGH (ref 0–0)
NRBC # FLD: 0.03 K/UL — HIGH (ref 0–0)
NRBC # FLD: 0.03 K/UL — HIGH (ref 0–0)
NRBC BLD AUTO-RTO: 0 /100 WBCS — SIGNIFICANT CHANGE UP (ref 0–0)
NRBC BLD AUTO-RTO: 0 /100 WBCS — SIGNIFICANT CHANGE UP (ref 0–0)
PCO2 BLDA: 23 MMHG — LOW (ref 32–45)
PCO2 BLDV: 46 MMHG — HIGH (ref 39–42)
PH BLDA: 7.18 — CRITICAL LOW (ref 7.35–7.45)
PH BLDV: 7.03 — CRITICAL LOW (ref 7.32–7.43)
PH UR: 6 — SIGNIFICANT CHANGE UP (ref 5–8)
PHOSPHATE SERPL-MCNC: 4.3 MG/DL — SIGNIFICANT CHANGE UP (ref 2.5–4.5)
PHOSPHATE SERPL-MCNC: 4.7 MG/DL — HIGH (ref 2.5–4.5)
PLATELET # BLD AUTO: 212 K/UL — SIGNIFICANT CHANGE UP (ref 150–400)
PLATELET # BLD AUTO: 272 K/UL — SIGNIFICANT CHANGE UP (ref 150–400)
PMV BLD: 10.1 FL — SIGNIFICANT CHANGE UP (ref 7–13)
PMV BLD: 10.2 FL — SIGNIFICANT CHANGE UP (ref 7–13)
PO2 BLDA: 113 MMHG — HIGH (ref 83–108)
PO2 BLDV: 59 MMHG — HIGH (ref 25–45)
POTASSIUM SERPL-MCNC: 4.9 MMOL/L — SIGNIFICANT CHANGE UP (ref 3.5–5.3)
POTASSIUM SERPL-MCNC: 5 MMOL/L — SIGNIFICANT CHANGE UP (ref 3.5–5.3)
POTASSIUM SERPL-SCNC: 4.9 MMOL/L — SIGNIFICANT CHANGE UP (ref 3.5–5.3)
POTASSIUM SERPL-SCNC: 5 MMOL/L — SIGNIFICANT CHANGE UP (ref 3.5–5.3)
PROT SERPL-MCNC: 5.6 GM/DL — LOW (ref 6–8.3)
PROT SERPL-MCNC: 5.9 GM/DL — LOW (ref 6–8.3)
PROT UR-MCNC: 100 MG/DL
PROTHROM AB SERPL-ACNC: 21.8 SEC — HIGH (ref 9.9–13.4)
PROTHROM AB SERPL-ACNC: 24.3 SEC — HIGH (ref 9.9–13.4)
RBC # BLD: 3.96 M/UL — SIGNIFICANT CHANGE UP (ref 3.8–5.2)
RBC # BLD: 4.65 M/UL — SIGNIFICANT CHANGE UP (ref 3.8–5.2)
RBC # FLD: 14.7 % — HIGH (ref 10.3–14.5)
RBC # FLD: 14.8 % — HIGH (ref 10.3–14.5)
RBC CASTS # UR COMP ASSIST: SIGNIFICANT CHANGE UP /HPF (ref 0–4)
RSV RNA NPH QL NAA+NON-PROBE: SIGNIFICANT CHANGE UP
SAO2 % BLDA: 100 % — HIGH (ref 94–98)
SAO2 % BLDV: 89 % — HIGH (ref 67–88)
SARS-COV-2 RNA SPEC QL NAA+PROBE: SIGNIFICANT CHANGE UP
SODIUM SERPL-SCNC: 139 MMOL/L — SIGNIFICANT CHANGE UP (ref 135–145)
SODIUM SERPL-SCNC: 139 MMOL/L — SIGNIFICANT CHANGE UP (ref 135–145)
SOURCE RESPIRATORY: SIGNIFICANT CHANGE UP
SP GR SPEC: 1.01 — SIGNIFICANT CHANGE UP (ref 1–1.03)
UROBILINOGEN FLD QL: 0.2 MG/DL — SIGNIFICANT CHANGE UP (ref 0.2–1)
WBC # BLD: 17.99 K/UL — HIGH (ref 3.8–10.5)
WBC # BLD: 18.65 K/UL — HIGH (ref 3.8–10.5)
WBC # FLD AUTO: 17.99 K/UL — HIGH (ref 3.8–10.5)
WBC # FLD AUTO: 18.65 K/UL — HIGH (ref 3.8–10.5)
WBC UR QL: ABNORMAL /HPF (ref 0–5)

## 2025-04-10 PROCEDURE — 74176 CT ABD & PELVIS W/O CONTRAST: CPT | Mod: MC

## 2025-04-10 PROCEDURE — 87641 MR-STAPH DNA AMP PROBE: CPT

## 2025-04-10 PROCEDURE — P9047: CPT | Mod: JZ

## 2025-04-10 PROCEDURE — 36600 WITHDRAWAL OF ARTERIAL BLOOD: CPT

## 2025-04-10 PROCEDURE — 97163 PT EVAL HIGH COMPLEX 45 MIN: CPT | Mod: GP

## 2025-04-10 PROCEDURE — 70450 CT HEAD/BRAIN W/O DYE: CPT | Mod: 26

## 2025-04-10 PROCEDURE — 99291 CRITICAL CARE FIRST HOUR: CPT | Mod: FS

## 2025-04-10 PROCEDURE — 87640 STAPH A DNA AMP PROBE: CPT

## 2025-04-10 PROCEDURE — 82728 ASSAY OF FERRITIN: CPT

## 2025-04-10 PROCEDURE — 84100 ASSAY OF PHOSPHORUS: CPT

## 2025-04-10 PROCEDURE — 83735 ASSAY OF MAGNESIUM: CPT

## 2025-04-10 PROCEDURE — 80048 BASIC METABOLIC PNL TOTAL CA: CPT

## 2025-04-10 PROCEDURE — 92610 EVALUATE SWALLOWING FUNCTION: CPT | Mod: GN

## 2025-04-10 PROCEDURE — 92507 TX SP LANG VOICE COMM INDIV: CPT | Mod: GN

## 2025-04-10 PROCEDURE — 92523 SPEECH SOUND LANG COMPREHEN: CPT | Mod: GN

## 2025-04-10 PROCEDURE — 74176 CT ABD & PELVIS W/O CONTRAST: CPT | Mod: 26

## 2025-04-10 PROCEDURE — 86900 BLOOD TYPING SEROLOGIC ABO: CPT

## 2025-04-10 PROCEDURE — 85730 THROMBOPLASTIN TIME PARTIAL: CPT

## 2025-04-10 PROCEDURE — 71250 CT THORAX DX C-: CPT | Mod: 26

## 2025-04-10 PROCEDURE — 83605 ASSAY OF LACTIC ACID: CPT

## 2025-04-10 PROCEDURE — 99285 EMERGENCY DEPT VISIT HI MDM: CPT

## 2025-04-10 PROCEDURE — 85027 COMPLETE CBC AUTOMATED: CPT

## 2025-04-10 PROCEDURE — 84466 ASSAY OF TRANSFERRIN: CPT

## 2025-04-10 PROCEDURE — 71045 X-RAY EXAM CHEST 1 VIEW: CPT | Mod: 26

## 2025-04-10 PROCEDURE — 83540 ASSAY OF IRON: CPT

## 2025-04-10 PROCEDURE — 93308 TTE F-UP OR LMTD: CPT

## 2025-04-10 PROCEDURE — 36415 COLL VENOUS BLD VENIPUNCTURE: CPT

## 2025-04-10 PROCEDURE — 93010 ELECTROCARDIOGRAM REPORT: CPT

## 2025-04-10 PROCEDURE — 82803 BLOOD GASES ANY COMBINATION: CPT

## 2025-04-10 PROCEDURE — 85610 PROTHROMBIN TIME: CPT

## 2025-04-10 PROCEDURE — 83550 IRON BINDING TEST: CPT

## 2025-04-10 PROCEDURE — 92526 ORAL FUNCTION THERAPY: CPT | Mod: GN

## 2025-04-10 PROCEDURE — 86901 BLOOD TYPING SEROLOGIC RH(D): CPT

## 2025-04-10 PROCEDURE — 86850 RBC ANTIBODY SCREEN: CPT

## 2025-04-10 PROCEDURE — 85025 COMPLETE CBC W/AUTO DIFF WBC: CPT

## 2025-04-10 PROCEDURE — 80053 COMPREHEN METABOLIC PANEL: CPT

## 2025-04-10 RX ORDER — MIDODRINE HYDROCHLORIDE 5 MG/1
10 TABLET ORAL EVERY 8 HOURS
Refills: 0 | Status: DISCONTINUED | OUTPATIENT
Start: 2025-04-10 | End: 2025-04-16

## 2025-04-10 RX ORDER — NOREPINEPHRINE BITARTRATE 8 MG
0.05 SOLUTION INTRAVENOUS
Qty: 8 | Refills: 0 | Status: DISCONTINUED | OUTPATIENT
Start: 2025-04-10 | End: 2025-04-13

## 2025-04-10 RX ORDER — ACETAMINOPHEN 500 MG/5ML
1000 LIQUID (ML) ORAL ONCE
Refills: 0 | Status: COMPLETED | OUTPATIENT
Start: 2025-04-10 | End: 2025-04-10

## 2025-04-10 RX ORDER — VANCOMYCIN HCL IN 5 % DEXTROSE 1.5G/250ML
1000 PLASTIC BAG, INJECTION (ML) INTRAVENOUS ONCE
Refills: 0 | Status: COMPLETED | OUTPATIENT
Start: 2025-04-10 | End: 2025-04-10

## 2025-04-10 RX ORDER — PIPERACILLIN-TAZO-DEXTROSE,ISO 3.375G/5
3.38 IV SOLUTION, PIGGYBACK PREMIX FROZEN(ML) INTRAVENOUS ONCE
Refills: 0 | Status: COMPLETED | OUTPATIENT
Start: 2025-04-10 | End: 2025-04-10

## 2025-04-10 RX ORDER — SODIUM BICARBONATE 1 MEQ/ML
0.23 SYRINGE (ML) INTRAVENOUS
Qty: 150 | Refills: 0 | Status: DISCONTINUED | OUTPATIENT
Start: 2025-04-10 | End: 2025-04-11

## 2025-04-10 RX ORDER — HEPARIN SODIUM 1000 [USP'U]/ML
INJECTION INTRAVENOUS; SUBCUTANEOUS
Qty: 25000 | Refills: 0 | Status: DISCONTINUED | OUTPATIENT
Start: 2025-04-10 | End: 2025-04-10

## 2025-04-10 RX ORDER — PIPERACILLIN-TAZO-DEXTROSE,ISO 3.375G/5
3.38 IV SOLUTION, PIGGYBACK PREMIX FROZEN(ML) INTRAVENOUS EVERY 12 HOURS
Refills: 0 | Status: DISCONTINUED | OUTPATIENT
Start: 2025-04-11 | End: 2025-04-15

## 2025-04-10 RX ADMIN — Medication 1000 MILLIGRAM(S): at 13:35

## 2025-04-10 RX ADMIN — Medication 400 MILLIGRAM(S): at 13:15

## 2025-04-10 RX ADMIN — Medication 75 MEQ/KG/HR: at 17:51

## 2025-04-10 RX ADMIN — Medication 1000 MILLIGRAM(S): at 14:30

## 2025-04-10 RX ADMIN — Medication 1750 MILLILITER(S): at 11:43

## 2025-04-10 RX ADMIN — Medication 1000 MILLILITER(S): at 12:43

## 2025-04-10 RX ADMIN — Medication 1000 MILLILITER(S): at 13:50

## 2025-04-10 RX ADMIN — Medication 200 GRAM(S): at 11:41

## 2025-04-10 RX ADMIN — NOREPINEPHRINE BITARTRATE 6.02 MICROGRAM(S)/KG/MIN: 8 SOLUTION at 13:27

## 2025-04-10 RX ADMIN — Medication 1750 MILLILITER(S): at 12:40

## 2025-04-10 RX ADMIN — MIDODRINE HYDROCHLORIDE 10 MILLIGRAM(S): 5 TABLET ORAL at 21:25

## 2025-04-10 RX ADMIN — Medication 3.38 GRAM(S): at 12:10

## 2025-04-10 RX ADMIN — Medication 250 MILLIGRAM(S): at 13:19

## 2025-04-10 RX ADMIN — Medication 1000 MILLIGRAM(S): at 13:30

## 2025-04-10 NOTE — ED PROVIDER NOTE - CLINICAL SUMMARY MEDICAL DECISION MAKING FREE TEXT BOX
89 y/o F w/ PMH of PE, DVT, HTN, dyslipidemia, colonc ancer s/p partial colectomy w/ colostomy, ovarian Ca s/p ORA, glaucoma Brought in by EMS for decreased mental status, decreased oral intake, dark urine progressively worsening over the last few days according to family report to EMS.  Patient is normally alert and oriented x 2 at baseline.  Patient unable to provide HPI or review of systems at this time due to her current mental status.  Patient is cold to the touch.  Has been bedbound and there have been no falls out of bed noted.  Will obtain rectal temp, obtain full septic workup, CT of the head, if patient is hypothermic we will provide warming measures to include warm IV fluids  and Keaton hugger.

## 2025-04-10 NOTE — H&P ADULT - ASSESSMENT
Problem List:  1) septic shock  2) ARF  3) metabolic acidosis   4) urosepsis   5) protein caloric malnutrition     Admit to CCU   s/p 3L IVF in the ER, started on levophed, actively titrating to maintain MAP > 65. Would hold off on further IVF boluses.   Stable on room air  NPO except meds for now, may need swallow eval   ARF suspect ATN and hypovolemia, given IVF, support MAP, start bicarb infusion as metabolic acidosis most likely related to renal failure and hyperchloremia  hypothermic, grossly + UA, ? hydro on POCUS, check CT abdomen to f/o stone, given zosyn, continue q12, cultures sent  Add midodrine  If no stone found on CT and no intervention planned start Eliquis if able to take PO for PE history, if unable to take PO may need heparin gtt  monitor blood glucose   Patent is DNR/DNI after discussion with daughter analy number is [phone]  Plan discussed with ICU attending Dr. Linder

## 2025-04-10 NOTE — ED ADULT NURSE NOTE - BREATHING, MLM
Talked to patient, would like labs drawn via [facility] this week. Orders faxed to [facility] team, email sent with Marisa for FYI.     Patient states she is doing well. Continues to walk 4+ miles a day. Will call with any questions/concerns.    Spontaneous, unlabored and symmetrical

## 2025-04-10 NOTE — H&P ADULT - CRITICAL CARE ATTENDING COMMENT
greater than 50% of time spent reviewing labs, notes, orders and radiographs, coordinating care  discussed with nursing,  icu team, consultants  critically ill patient, actively managing vasopressors, high risk for clinical deterioration

## 2025-04-10 NOTE — ED PROVIDER NOTE - CARDIAC RHYTHM
Interventional Cardiology  Progress Note    Reason for Visit: Cardiovascular revisit  Last visit: 3/26/2019  Referring physician: Isaac Helton MD   Nephrologist: Dr. Krishan Robles   PULM: Dr. Melgar Photowala  Urology: Dr. Rufina Corona    [facility]  * aerobic activity per week as tolerated.   5. Emphasized the need for low salt, low fat, low cholesterol diet.   6. Return to clinic in 6 months [facility] site upon completion of aforementioned studies or sooner for concerns. All questions were answered to the patient's satisfaction and to the best of my abilities.    SUBJECTIVE:   Petar Camacho is a 89 year old male who presents on 8/26/2019 for cardiovascular reevaluation. He established with me in clinic He had worsening of his renal function post cath, though had resolution of his angina. He also has HTN, HLD, CKD s/p Left nephrectomy, Stable AAA and COPD with former tobacco abuse history.     Early August 2016, had a fall/syncope in [place], [place]. Was at a camp, then chap outside [facility], began to feel \"something coming\" and passed out. Does not recall being too hot, 1015 AM. Seen by EMS then [facility], transferred to [facility]. [place] to be due to anemia. Now awaiting GI eval for colonoscopy and Nephro for possible infusions.     July 31, 2018 episode of syncope again, attributed to dehydration. Evaluated in the ER at noon then discharged by 4 PM after IVF. Had been standing for some time then felt dizzy, sat down then passed out. Witnessed, supported fall with no head injury. Noted back pain, MRI.     August 2018 he presented to [facility] with chest pain shortness of breath. Laboratory data was consistent with an STEMI and he underwent cardiac catheterization which revealed redemonstration of a chronic totally occluded mid RCA and early collaterals from septal perforators from the LAD. However, there was noted progression of disease of the proximal and mid LAD heavily calcified lesions and by FFR 0.77  consistent with hemodynamically significant stenosis.    3/2019 admitted for lightheadedness/syncope in [place] at  [facility], [facility])     Stable by serial Abd YENIFER CMS/[facility]) 03/06/2014   • COPD (chronic obstructive pulmonary disease) (CMS/[facility])    • Pulmonary HTN (CMS/[facility])    • via RRA 8/30/2018 at [facility] Dr. Millan    • Coronary angioplasty with stent placement  03/02/2013    [facility] - UA/NSTEMI, Promus Element 2.5x18 mm Proximal LCX, LM 0%, LAD 40-50% prox and prox mid, RCA dominant with complex long >90% mid lesion small RPDA,RPLA no collaterals (similar to 2003)  LVEDP 21 mmHg   • Nephrectomy Left    • Tonsillectomy         Allergies:   ALLERGIES:   Allergen Reactions   • Amlodipine Besylate Other (See Comments) and SWELLING     Bilat. Ankle and feet edema  Bilat. Ankle and feet edema     • Atorvastatin Other (See Comments)     ABNORMAL LFT'S  ABNORMAL LFT'S     • Enalapril Other (See Comments)     DC'd per Dr. Nickerson due to Hyperkalemia  DC'd per Dr. Nickerson due to Hyperkalemia     • Enalapril Maleate Other (See Comments)   • Isosorbide Other (See Comments)   • Penicillins HIVES and Other (See Comments)   • Protamine Other (See Comments)     Unsure  Unsure         Medications:   Current Outpatient Medications   Medication Sig Dispense Refill   • clopidogrel (PLAVIX) 75 MG tablet Take 1 tablet by mouth daily. 90 tablet 3   • metoPROLOL succinate (TOPROL-XL) 25 MG 24 hr tablet TAKE 1 TABLET BY MOUTH EVERY DAY 90 tablet 3   • simvastatin (ZOCOR) 80 MG tablet TAKE 1 TABLET BY MOUTH EVERY DAY AT NIGHT 90 tablet 1   • calcitRIOL (ROCALTROL) 0.25 MCG capsule TAKE 1 CAPSULE BY MOUTH EVERY OTHER DAY 45 capsule 1   • doxazosin (CARDURA) 8 MG tablet 2 po daily (Patient taking differently: 8 mg. 2 po daily) 180 tablet 3   • bumetanide (BUMEX) 1 MG tablet Take 1mg by mouth on Monday and Thursday 30 tablet 1   • Magnesium Cl-Calcium Carbonate (SLOW-MAG) 71.5-119 MG Tablet Enteric Coated Take 1 tablet by mouth 3 times daily. (Slow - Mag) 60 tablet 0   • albuterol 108 (90 Base) MCG/ACT inhaler Inhale 2 puffs into the lungs.     • aspirin (ECOTRIN) 81 MG EC tablet Take 81 mg by mouth daily.      • Cholecalciferol (VITAMIN D3) 1000 units capsule Take  problems Son        ROS  Review of Systems   Constitutional: Negative.    HENT: Negative.    Eyes: Negative.    Respiratory: Positive for cough.    Cardiovascular: Negative.    Gastrointestinal: Negative.    Endocrine: Negative.    Genitourinary: Negative.    Musculoskeletal: Negative.    Skin: Negative.    Allergic/Immunologic: Negative.    Neurological: Negative.    Hematological: Negative.    Psychiatric/Behavioral: Negative.        OBJECTIVE:  Visit Vitals  /50 (BP Location: [facility], Patient Position: Sitting, Cuff Size: Regular)   Pulse 60   Ht 6' (1.829 m)   Wt 71.2 kg (157 lb)   SpO2 94%   BMI 21.29 kg/m²       Physical Exam  Physical Exam  Vitals signs and nursing note reviewed.   Constitutional:       Appearance: He is well-developed.   HENT:      Head: Normocephalic and atraumatic.   Eyes:      Conjunctiva/sclera: Conjunctivae normal.      Pupils: Pupils are equal, round, and reactive to light.   Neck:      Musculoskeletal: Normal range of motion and neck supple.   Cardiovascular:      Rate and Rhythm: Normal rate and regular rhythm.      Pulses: Normal pulses.           Carotid pulses are 2+ on the right side and 2+ on the left side.       Radial pulses are 2+ on the right side and 2+ on the left side.        Dorsalis pedis pulses are 2+ on the right side and 2+ on the left side.        Posterior tibial pulses are 2+ on the right side and 2+ on the left side.      Heart sounds: Normal heart sounds.      Comments: Radial Cassius's test  Pulmonary:      Effort: Pulmonary effort is normal.      Breath sounds: Normal breath sounds.      Comments: Mild rales left lower base no obvious conversational dyspnea  No rhonchi or wheezes  Abdominal:      General: Bowel sounds are normal.      Palpations: Abdomen is soft.   Musculoskeletal: Normal range of motion.   Skin:     General: Skin is warm and dry.   Neurological:      Mental Status: He is alert and oriented to person, place, and time.         Lab / Testing:   PARATHYROID HORMONE, INTACT 01/14/2019 89.5* 23.0 - 73.0 pg/mL Final   • CALCIUM, SERUM 01/14/2019 9.3  8.6 - 10.6 mg/dL Final   Lab Services on 11/01/2018   Component Date Value Ref Range Status   • SEDIMENTATION RATE, RBC 11/01/2018 10  0 - 10 mm/h Final   Lab Services on 11/01/2018   Component Date Value Ref Range Status   • CREATINE KINASE 11/01/2018 79  55 - 170 U/L This is over a craniocaudal length of 9.3 cm.     This study has been marked significant follow-up in Epic and the findings have been conveyed to the ordering provider Dr. Helton via priority in box message at 11:05 AM on 05/09/2019.    Screen Abdominal Aorta Ultrasound 7/6/2018  there is a stable 4.8-cm infrarenal aortic aneurysm    ECHO March 23, 2016  * Normal LV size with normal function. LVEF=55%. Left ventricular      filling pattern c/w grade 1 diastolic dysfunction.    * There is mild concentric hypertrophy.    * Normal right ventricular size with normal function.    * Normal left atrium. Normal right atrium.    * Mildly sclerotic trileaflet aortic valve without stenosis or      regurgitation.    * Normal mitral valve. There is mild mitral regurgitation.    * Normal tricuspid valve. There is no tricuspid regurgitation.    * Normal pulmonic valve.    Lexiscan nuclear stress test March 23, 2016  1. Abnormal myocardial perfusion examination.   2. The overall quality of the study is good.  3. Small fixed perfusion defect of moderate severity involving  the basilar inferior/inferolateral wall(s) (predominantly  segments 4 and 5) with corresponding wall motion abnormality  suggestive of prior infarct.  4. Borderline left ventricular volume with preserved systolic  thickening and function and an ejection fraction of 50%.  5. No previous study was available for comparison.    24 hour Holter monitor Aug 11, 2016  NSR  Few PVC    48 hours monitor reviewed from 3/25 to 3/27/2019 for evaluation of Syncope  1. Baseline rhythm Normal Sinus Rhythm   2. Heart rate ranged from 45 to 107, mean 70   3. No Afib/flutter   4. Isolated  9 beat NSVT, bigeminy, trigeminy, PVC couplets noted  5. No high degree block seen   6. Patient diary not used             Electronically Signed by:    Konrad Millan DO , 8/26/2019     regular

## 2025-04-10 NOTE — PATIENT PROFILE ADULT - FALL HARM RISK - HARM RISK INTERVENTIONS
Assistance with ambulation/Assistance OOB with selected safe patient handling equipment/Communicate Risk of Fall with Harm to all staff/Discuss with provider need for PT consult/Orthostatic vital signs/Provide patient with walking aids - walker, cane, crutches/Reinforce activity limits and safety measures with patient and family/Tailored Fall Risk Interventions/Visual Cue: Yellow wristband and red socks/Bed in lowest position, wheels locked, appropriate side rails in place/Call bell, personal items and telephone in reach/Instruct patient to call for assistance before getting out of bed or chair/Non-slip footwear when patient is out of bed/[place] to call system/Physically safe environment - no spills, clutter or unnecessary equipment/Purposeful Proactive Rounding/Room/bathroom lighting operational, light cord in reach

## 2025-04-10 NOTE — ED ADULT NURSE NOTE - OBJECTIVE STATEMENT
Brought in by ambulance from home with complaints of progressively worsening mental status, generalized weakness, on arrival pt is awake and A&Ox2. Pt is hypothermic, hypotensive, and bradycardic. Pt complaining of headache. Stage 2 pressure ulcer on coccyx bone. Not in respiratory distress.

## 2025-04-10 NOTE — H&P ADULT - NSHPPHYSICALEXAM_GEN_ALL_CORE
Physical Examination:    General: elderly, ill appearing, frail     HEENT: Pupils equal, reactive to light.  Symmetric    PULM: Clear to auscultation bilaterally    CVS: Regular rate and rhythm    ABD: Soft, nondistended, nontender,     EXT: No edema, nontender    SKIN: cool    NEURO: awake, not alert    POCUS: b/l kidneys appear to have dilated calyxes

## 2025-04-10 NOTE — H&P ADULT - CONVERSATION DETAILS
Family understands their mother's condition is critical and her functional status is poor with multiple chronic comorbidities. They would like to do whatever they can to get he through this infection, however they understand CPR or mechanical ventilation would not provide meaningful benefit for her. If she is not improving they would like to make sure she is not suffering. .

## 2025-04-10 NOTE — ED PROVIDER NOTE - CONSTITUTIONAL MENTATION
Somnolent but arousable to sternal rub following commands to squeeze hand with generalized weakness/ALTERED LOC

## 2025-04-10 NOTE — ED ADULT NURSE NOTE - NS ED NURSE TRANSPORT WITH
Cardiac Monitor/Defib/ACLS/Rescue Kit/O2/BVM Cardiac Monitor/Defib/ACLS/Rescue Kit/O2/BVM/IV pump/ACLS Rescue Kit

## 2025-04-10 NOTE — ED PROVIDER NOTE - OBJECTIVE STATEMENT
89 y/o F w/ PMH of PE, DVT, HTN, dyslipidemia, colonc ancer s/p partial colectomy w/ colostomy, ovarian Ca s/p ORA, glaucoma Brought in by EMS for decreased mental status, decreased oral intake, dark urine progressively worsening over the last few days according to family report to EMS.  Patient is normally alert and oriented x 2 at baseline.  Patient unable to provide HPI or review of systems at this time due to her current mental status.  Patient is cold to the touch.  Has been bedbound and there have been no falls out of bed noted.  Will obtain rectal temp, obtain full septic workup, CT of the head, if patient is hypothermic we will provide warming measures to include warm IV fluids  and Keaton hugger.

## 2025-04-10 NOTE — ED PROVIDER NOTE - CARE PLAN
Principal Discharge DX:	Septic shock  Secondary Diagnosis:	Urinary tract infection  Secondary Diagnosis:	Pneumonia   1

## 2025-04-10 NOTE — H&P ADULT - NS ATTEND AMEND GEN_ALL_CORE FT
89 y/o F w/ PMH of PE, DVT, HTN, dyslipidemia, colon cancer s/p partial colectomy w/ colostomy, ovarian Ca s/p ORA, glaucoma Brought in by EMS for decreased mental status, decreased oral intake, dark urine, baseline AAOx2.  Patient was found hypothermic, hypotensive refractory to 3L started on pressor, zeenat, UTI, JOEY admitted to CCU for possible septic shock.    Per GOC with family by BRUNILDA Francis, patient is made DNR/DNI  Will continue bicarb drip, empiric antibiotic, pending cultures   start heparin drip   TTE limited ordered   wean pressor as tolerated   lactate from 2.5 to 2.0   midodrine as tolerate  if kidney function continues to get worse, unclear if dialysis is within goal of care  palliative care consult  prognosis is very guarded, 89 y/o F w/ PMH of PE, DVT, HTN, dyslipidemia, colon cancer s/p partial colectomy w/ colostomy, ovarian Ca s/p ORA, glaucoma Brought in by EMS for decreased mental status, decreased oral intake, dark urine, baseline AAOx2.  Patient was found hypothermic, hypotensive refractory to 3L started on pressor, zeenat, UTI, JOEY admitted to CCU for possible septic shock.    Per GOC with family by BRNUILDA Francis, patient is made DNR/DNI  Will continue bicarb drip, empiric antibiotic, pending cultures   start heparin drip   TTE limited ordered   wean pressor as tolerated   lactate from 2.5 to 2.0   midodrine as tolerate  if kidney function continues to get worse, unclear if dialysis is within goal of care  palliative care consult  hematuria, gross blood in urine, holding AC>   prognosis is very guarded,

## 2025-04-10 NOTE — ED ADULT NURSE NOTE - SEPSIS REFERENCE DATA CRITERIA 1
Alert and oriented, no focal deficits, no motor or sensory deficits. Abormal VS: Temp > 100F or < 96.8F; SBP < 90 mmHG; HR > 120bpm; Resp > 24/min

## 2025-04-10 NOTE — ED ADULT TRIAGE NOTE - CHIEF COMPLAINT QUOTE
Pt is A&OX2 with no documented history of dementia. BIBEMS with see chief complaint quote. EMS reports she was discharged from the hospital on 3/11/25. Daughter stated that she has been having a steady decline over the last couple days. She is not drinking or drinking, also has bed sores.   Patient placed directly into Trauma 1 with MD and RN's at bedside.  Taken for EKG, cardiac monitor and rectal temperature.  's.

## 2025-04-11 ENCOUNTER — RESULT REVIEW (OUTPATIENT)
Age: 89
End: 2025-04-11

## 2025-04-11 DIAGNOSIS — Z51.5 ENCOUNTER FOR PALLIATIVE CARE: ICD-10-CM

## 2025-04-11 DIAGNOSIS — Z71.89 OTHER SPECIFIED COUNSELING: ICD-10-CM

## 2025-04-11 PROBLEM — Z78.9 OTHER SPECIFIED HEALTH STATUS: Chronic | Status: INACTIVE | Noted: 2021-04-27 | Resolved: 2025-04-10

## 2025-04-11 LAB
ALBUMIN SERPL ELPH-MCNC: 2.1 G/DL — LOW (ref 3.3–5)
ALBUMIN SERPL ELPH-MCNC: 2.2 G/DL — LOW (ref 3.3–5)
ALP SERPL-CCNC: 84 U/L — SIGNIFICANT CHANGE UP (ref 40–120)
ALP SERPL-CCNC: 90 U/L — SIGNIFICANT CHANGE UP (ref 40–120)
ALT FLD-CCNC: 35 U/L — SIGNIFICANT CHANGE UP (ref 12–78)
ALT FLD-CCNC: 36 U/L — SIGNIFICANT CHANGE UP (ref 12–78)
ANION GAP SERPL CALC-SCNC: 10 MMOL/L — SIGNIFICANT CHANGE UP (ref 5–17)
ANION GAP SERPL CALC-SCNC: 6 MMOL/L — SIGNIFICANT CHANGE UP (ref 5–17)
APTT BLD: 165.3 SEC — CRITICAL HIGH (ref 24.5–35.6)
AST SERPL-CCNC: 20 U/L — SIGNIFICANT CHANGE UP (ref 15–37)
AST SERPL-CCNC: 26 U/L — SIGNIFICANT CHANGE UP (ref 15–37)
BASE EXCESS BLDA CALC-SCNC: -4 MMOL/L — LOW (ref -2–3)
BASOPHILS # BLD AUTO: 0.03 K/UL — SIGNIFICANT CHANGE UP (ref 0–0.2)
BASOPHILS NFR BLD AUTO: 0.2 % — SIGNIFICANT CHANGE UP (ref 0–2)
BILIRUB SERPL-MCNC: 0.3 MG/DL — SIGNIFICANT CHANGE UP (ref 0.2–1.2)
BILIRUB SERPL-MCNC: 0.4 MG/DL — SIGNIFICANT CHANGE UP (ref 0.2–1.2)
BLOOD GAS COMMENTS ARTERIAL: SIGNIFICANT CHANGE UP
BUN SERPL-MCNC: 66 MG/DL — HIGH (ref 7–23)
BUN SERPL-MCNC: 78 MG/DL — HIGH (ref 7–23)
CALCIUM SERPL-MCNC: 8.8 MG/DL — SIGNIFICANT CHANGE UP (ref 8.5–10.1)
CALCIUM SERPL-MCNC: 8.9 MG/DL — SIGNIFICANT CHANGE UP (ref 8.5–10.1)
CHLORIDE SERPL-SCNC: 113 MMOL/L — HIGH (ref 96–108)
CHLORIDE SERPL-SCNC: 115 MMOL/L — HIGH (ref 96–108)
CO2 SERPL-SCNC: 17 MMOL/L — LOW (ref 22–31)
CO2 SERPL-SCNC: 24 MMOL/L — SIGNIFICANT CHANGE UP (ref 22–31)
CREAT SERPL-MCNC: 1.61 MG/DL — HIGH (ref 0.5–1.3)
CREAT SERPL-MCNC: 2.09 MG/DL — HIGH (ref 0.5–1.3)
CULTURE RESULTS: SIGNIFICANT CHANGE UP
EGFR: 21 ML/MIN/1.73M2 — LOW
EGFR: 21 ML/MIN/1.73M2 — LOW
EGFR: 29 ML/MIN/1.73M2 — LOW
EGFR: 29 ML/MIN/1.73M2 — LOW
EOSINOPHIL # BLD AUTO: 0.04 K/UL — SIGNIFICANT CHANGE UP (ref 0–0.5)
EOSINOPHIL NFR BLD AUTO: 0.3 % — SIGNIFICANT CHANGE UP (ref 0–6)
GAS PNL BLDA: SIGNIFICANT CHANGE UP
GLUCOSE SERPL-MCNC: 112 MG/DL — HIGH (ref 70–99)
GLUCOSE SERPL-MCNC: 206 MG/DL — HIGH (ref 70–99)
HCO3 BLDA-SCNC: 20 MMOL/L — LOW (ref 21–28)
HCT VFR BLD CALC: 31.8 % — LOW (ref 34.5–45)
HCT VFR BLD CALC: 34 % — LOW (ref 34.5–45)
HGB BLD-MCNC: 11 G/DL — LOW (ref 11.5–15.5)
HGB BLD-MCNC: 11.4 G/DL — LOW (ref 11.5–15.5)
IMM GRANULOCYTES # BLD AUTO: 0.15 K/UL — HIGH (ref 0–0.07)
IMM GRANULOCYTES NFR BLD AUTO: 1.2 % — HIGH (ref 0–0.9)
LYMPHOCYTES # BLD AUTO: 2.13 K/UL — SIGNIFICANT CHANGE UP (ref 1–3.3)
LYMPHOCYTES NFR BLD AUTO: 16.9 % — SIGNIFICANT CHANGE UP (ref 13–44)
MAGNESIUM SERPL-MCNC: 1.7 MG/DL — SIGNIFICANT CHANGE UP (ref 1.6–2.6)
MAGNESIUM SERPL-MCNC: 2 MG/DL — SIGNIFICANT CHANGE UP (ref 1.6–2.6)
MCHC RBC-ENTMCNC: 32.6 PG — SIGNIFICANT CHANGE UP (ref 27–34)
MCHC RBC-ENTMCNC: 32.7 PG — SIGNIFICANT CHANGE UP (ref 27–34)
MCHC RBC-ENTMCNC: 33.5 G/DL — SIGNIFICANT CHANGE UP (ref 32–36)
MCHC RBC-ENTMCNC: 34.6 G/DL — SIGNIFICANT CHANGE UP (ref 32–36)
MCV RBC AUTO: 94.4 FL — SIGNIFICANT CHANGE UP (ref 80–100)
MCV RBC AUTO: 97.4 FL — SIGNIFICANT CHANGE UP (ref 80–100)
MONOCYTES # BLD AUTO: 0.99 K/UL — HIGH (ref 0–0.9)
MONOCYTES NFR BLD AUTO: 7.9 % — SIGNIFICANT CHANGE UP (ref 2–14)
MRSA PCR RESULT.: SIGNIFICANT CHANGE UP
NEUTROPHILS # BLD AUTO: 9.23 K/UL — HIGH (ref 1.8–7.4)
NEUTROPHILS NFR BLD AUTO: 73.5 % — SIGNIFICANT CHANGE UP (ref 43–77)
NRBC # BLD AUTO: 0.03 K/UL — HIGH (ref 0–0)
NRBC # BLD AUTO: 0.05 K/UL — HIGH (ref 0–0)
NRBC # FLD: 0.03 K/UL — HIGH (ref 0–0)
NRBC # FLD: 0.05 K/UL — HIGH (ref 0–0)
NRBC BLD AUTO-RTO: 0 /100 WBCS — SIGNIFICANT CHANGE UP (ref 0–0)
NRBC BLD AUTO-RTO: 0 /100 WBCS — SIGNIFICANT CHANGE UP (ref 0–0)
PCO2 BLDA: 32 MMHG — SIGNIFICANT CHANGE UP (ref 32–45)
PH BLDA: 7.4 — SIGNIFICANT CHANGE UP (ref 7.35–7.45)
PHOSPHATE SERPL-MCNC: 1.8 MG/DL — LOW (ref 2.5–4.5)
PHOSPHATE SERPL-MCNC: 3 MG/DL — SIGNIFICANT CHANGE UP (ref 2.5–4.5)
PLATELET # BLD AUTO: 190 K/UL — SIGNIFICANT CHANGE UP (ref 150–400)
PLATELET # BLD AUTO: 203 K/UL — SIGNIFICANT CHANGE UP (ref 150–400)
PMV BLD: 10.3 FL — SIGNIFICANT CHANGE UP (ref 7–13)
PMV BLD: 9.9 FL — SIGNIFICANT CHANGE UP (ref 7–13)
PO2 BLDA: 93 MMHG — SIGNIFICANT CHANGE UP (ref 83–108)
POTASSIUM SERPL-MCNC: 3.3 MMOL/L — LOW (ref 3.5–5.3)
POTASSIUM SERPL-MCNC: 3.4 MMOL/L — LOW (ref 3.5–5.3)
POTASSIUM SERPL-SCNC: 3.3 MMOL/L — LOW (ref 3.5–5.3)
POTASSIUM SERPL-SCNC: 3.4 MMOL/L — LOW (ref 3.5–5.3)
PROT SERPL-MCNC: 5.3 GM/DL — LOW (ref 6–8.3)
PROT SERPL-MCNC: 5.5 GM/DL — LOW (ref 6–8.3)
RBC # BLD: 3.37 M/UL — LOW (ref 3.8–5.2)
RBC # BLD: 3.49 M/UL — LOW (ref 3.8–5.2)
RBC # FLD: 14.6 % — HIGH (ref 10.3–14.5)
RBC # FLD: 14.8 % — HIGH (ref 10.3–14.5)
S AUREUS DNA NOSE QL NAA+PROBE: SIGNIFICANT CHANGE UP
SAO2 % BLDA: 99 % — HIGH (ref 94–98)
SODIUM SERPL-SCNC: 142 MMOL/L — SIGNIFICANT CHANGE UP (ref 135–145)
SODIUM SERPL-SCNC: 143 MMOL/L — SIGNIFICANT CHANGE UP (ref 135–145)
SPECIMEN SOURCE: SIGNIFICANT CHANGE UP
WBC # BLD: 12.23 K/UL — HIGH (ref 3.8–10.5)
WBC # BLD: 12.57 K/UL — HIGH (ref 3.8–10.5)
WBC # FLD AUTO: 12.23 K/UL — HIGH (ref 3.8–10.5)
WBC # FLD AUTO: 12.57 K/UL — HIGH (ref 3.8–10.5)

## 2025-04-11 PROCEDURE — 99291 CRITICAL CARE FIRST HOUR: CPT

## 2025-04-11 PROCEDURE — 93308 TTE F-UP OR LMTD: CPT | Mod: 26

## 2025-04-11 PROCEDURE — 99497 ADVNCD CARE PLAN 30 MIN: CPT | Mod: 25

## 2025-04-11 PROCEDURE — 99223 1ST HOSP IP/OBS HIGH 75: CPT

## 2025-04-11 RX ORDER — SODIUM CHLORIDE 9 G/1000ML
1000 INJECTION, SOLUTION INTRAVENOUS
Refills: 0 | Status: DISCONTINUED | OUTPATIENT
Start: 2025-04-11 | End: 2025-04-11

## 2025-04-11 RX ORDER — HEPARIN SODIUM 1000 [USP'U]/ML
2500 INJECTION INTRAVENOUS; SUBCUTANEOUS EVERY 6 HOURS
Refills: 0 | Status: DISCONTINUED | OUTPATIENT
Start: 2025-04-11 | End: 2025-04-12

## 2025-04-11 RX ORDER — POTASSIUM PHOSPHATE, MONOBASIC POTASSIUM PHOSPHATE, DIBASIC INJECTION, 236; 224 MG/ML; MG/ML
30 SOLUTION, CONCENTRATE INTRAVENOUS ONCE
Refills: 0 | Status: COMPLETED | OUTPATIENT
Start: 2025-04-11 | End: 2025-04-11

## 2025-04-11 RX ORDER — SODIUM CHLORIDE 9 G/1000ML
1000 INJECTION, SOLUTION INTRAVENOUS
Refills: 0 | Status: DISCONTINUED | OUTPATIENT
Start: 2025-04-11 | End: 2025-04-12

## 2025-04-11 RX ORDER — HEPARIN SODIUM 1000 [USP'U]/ML
INJECTION INTRAVENOUS; SUBCUTANEOUS
Qty: 25000 | Refills: 0 | Status: DISCONTINUED | OUTPATIENT
Start: 2025-04-11 | End: 2025-04-12

## 2025-04-11 RX ORDER — HEPARIN SODIUM 1000 [USP'U]/ML
5000 INJECTION INTRAVENOUS; SUBCUTANEOUS EVERY 6 HOURS
Refills: 0 | Status: DISCONTINUED | OUTPATIENT
Start: 2025-04-11 | End: 2025-04-12

## 2025-04-11 RX ORDER — ACETAMINOPHEN 500 MG/5ML
1000 LIQUID (ML) ORAL ONCE
Refills: 0 | Status: COMPLETED | OUTPATIENT
Start: 2025-04-11 | End: 2025-04-11

## 2025-04-11 RX ADMIN — Medication 1000 MILLIGRAM(S): at 14:50

## 2025-04-11 RX ADMIN — HEPARIN SODIUM 1100 UNIT(S)/HR: 1000 INJECTION INTRAVENOUS; SUBCUTANEOUS at 10:11

## 2025-04-11 RX ADMIN — HEPARIN SODIUM 0 UNIT(S)/HR: 1000 INJECTION INTRAVENOUS; SUBCUTANEOUS at 17:14

## 2025-04-11 RX ADMIN — Medication 1 APPLICATION(S): at 05:02

## 2025-04-11 RX ADMIN — Medication 25 GRAM(S): at 01:01

## 2025-04-11 RX ADMIN — Medication 400 MILLIGRAM(S): at 14:12

## 2025-04-11 RX ADMIN — Medication 100 MEQ/KG/HR: at 05:02

## 2025-04-11 RX ADMIN — MIDODRINE HYDROCHLORIDE 10 MILLIGRAM(S): 5 TABLET ORAL at 22:17

## 2025-04-11 RX ADMIN — Medication 25 GRAM(S): at 11:42

## 2025-04-11 RX ADMIN — Medication 100 MILLIEQUIVALENT(S): at 08:13

## 2025-04-11 RX ADMIN — MIDODRINE HYDROCHLORIDE 10 MILLIGRAM(S): 5 TABLET ORAL at 05:02

## 2025-04-11 RX ADMIN — MIDODRINE HYDROCHLORIDE 10 MILLIGRAM(S): 5 TABLET ORAL at 14:12

## 2025-04-11 RX ADMIN — Medication 40 MILLIGRAM(S): at 10:19

## 2025-04-11 RX ADMIN — SODIUM CHLORIDE 75 MILLILITER(S): 9 INJECTION, SOLUTION INTRAVENOUS at 16:53

## 2025-04-11 RX ADMIN — Medication 100 MILLIEQUIVALENT(S): at 09:46

## 2025-04-11 RX ADMIN — HEPARIN SODIUM 900 UNIT(S)/HR: 1000 INJECTION INTRAVENOUS; SUBCUTANEOUS at 18:20

## 2025-04-11 NOTE — PROGRESS NOTE ADULT - ASSESSMENT
89 y/o F w/ PMH of PE, DVT, HTN, dyslipidemia, colon cancer s/p partial colectomy w/ colostomy, ovarian Ca s/p ORA, glaucoma Brought in by EMS for decreased mental status, decreased oral intake, dark urine, baseline AAOx2.  Patient was found hypothermic, hypotensive refractory to 3L started on pressor, zeenat, UTI, JOEY admitted to CCU for possible septic shock.    Per GOC with family by BRUNILDA Francis, patient is made DNR/DNI    Shock  acute kidney injury  metabolic acidosis  hematuria - improved  possible UTI    - off bicarb drip, start 1/2 NS at 75   -empiric antibiotic, pending cultures   -start heparin drip   -TTE limited - systolic function normal   -wean pressor as tolerated , midodrine added   -lactate from 2.5 to 2.0   palliative care consult, recs appreciated   - passed SLP, start mince and moist   prognosis is very guarded,    will remain in CCU  89 y/o F w/ PMH of PE, DVT, HTN, dyslipidemia, colon cancer s/p partial colectomy w/ colostomy, ovarian Ca s/p ORA, glaucoma Brought in by EMS for decreased mental status, decreased oral intake, dark urine, baseline AAOx2.  Patient was found hypothermic, hypotensive refractory to 3L started on pressor, zeenat, UTI, JOEY admitted to CCU for possible septic shock.    Per GOC with family by BRUNILDA Francis, patient is made DNR/DNI    Shock  acute kidney injury  metabolic acidosis  hematuria - improved  possible UTI    - off bicarb drip, start 1/2 NS at 75  (LR not compatible with zosyn)   -empiric antibiotic, pending cultures   -start heparin drip   -TTE limited - systolic function normal   -wean pressor as tolerated , midodrine added   -lactate from 2.5 to 2.0   palliative care consult, recs appreciated   - passed SLP, start mince and moist   prognosis is very guarded,    will remain in CCU

## 2025-04-11 NOTE — SWALLOW BEDSIDE ASSESSMENT ADULT - ASR SWALLOW LABIAL MOBILITY
Effort was reduced when executing volitional labial actions without evidence of an overt lip focality.

## 2025-04-11 NOTE — SWALLOW BEDSIDE ASSESSMENT ADULT - COMMENTS
The pt was admitted to  with lethargy, altered mentation and reduced PO intake. Hospital course is remarkable for urosepsis, hypothermia, hypotension, bradycardia, hypoalbuminemia and encephalopathy. This profile is superimposed upon a history of HTN, HLD, glaucoma, past PE/DVT and prior Colon cancer for which she is status post partial colectomy/colostomy.

## 2025-04-11 NOTE — DIETITIAN INITIAL EVALUATION ADULT - PERTINENT MEDS FT
MEDICATIONS  (STANDING):  chlorhexidine 2% Cloths 1 Application(s) Topical <User Schedule>  heparin  Infusion.  Unit(s)/Hr (11 mL/Hr) IV Continuous <Continuous>  midodrine 10 milliGRAM(s) Oral every 8 hours  norepinephrine Infusion 0.05 MICROgram(s)/kG/Min (6.02 mL/Hr) IV Continuous <Continuous>  pantoprazole  Injectable 40 milliGRAM(s) IV Push daily  piperacillin/tazobactam IVPB.. 3.375 Gram(s) IV Intermittent every 12 hours  potassium chloride  10 mEq/100 mL IVPB 10 milliEquivalent(s) IV Intermittent every 1 hour  sodium bicarbonate  Infusion 0.234 mEq/kG/Hr (100 mL/Hr) IV Continuous <Continuous>    MEDICATIONS  (PRN):  heparin   Injectable 5000 Unit(s) IV Push every 6 hours PRN For aPTT less than 40  heparin   Injectable 2500 Unit(s) IV Push every 6 hours PRN For aPTT between 40 - 57    Home Medications:  aspirin 81 mg oral delayed release tablet: 1 tab(s) orally once a day (10 Mar 2025 20:07)  Eye Health Formula oral capsule: 2 cap(s) orally once a day (10 Mar 2025 20:07)  Metoprolol Tartrate 25 mg oral tablet: 0.5 tab(s) orally 2 times a day (10 Mar 2025 19:42)  pantoprazole 40 mg oral delayed release tablet: 1 tab(s) orally once a day (10 Mar 2025 19:39)  potassium chloride 10 mEq oral tablet, extended release: 1 tab(s) orally 2 times a day (10 Mar 2025 19:39)  Rocklatan 0.02%-0.005% ophthalmic solution: 1 drop(s) in the right eye once a day (in the evening) (11 Mar 2025 01:35)  simvastatin 20 mg oral tablet: 1 tab(s) orally once a day (at bedtime) (10 Mar 2025 19:39)  Vitamin B-12 1000 mcg oral tablet: 1 tab(s) orally once a day (11 Mar 2025 01:35)  Vitamin D3 125 mcg (5000 intl units) oral capsule: 1 cap(s) orally once a day (11 Mar 2025 01:35)

## 2025-04-11 NOTE — DIETITIAN INITIAL EVALUATION ADULT - NS FNS DIET ORDER
Diet, NPO:   Except Medications     Special Instructions for Nursing:  Except Medications (04-10-25 @ 15:27)

## 2025-04-11 NOTE — SWALLOW BEDSIDE ASSESSMENT ADULT - ASR SWALLOW LINGUAL MOBILITY
Effort was reduced when executing volitional lingual actions without evidence of an overt tongue focality.

## 2025-04-11 NOTE — SWALLOW BEDSIDE ASSESSMENT ADULT - SWALLOW EVAL: RECOMMENDED DIET
SUGGEST A MINCED AND MOIST DIET WITH THIN LIQUID TEXTURES AS PATIENT APPEARED CLINICALLY TOLERANT OF THESE FOOD CONSISTENCIES FROM AN OROPHARYNGEAL SWALLOWING PERSPECTIVE ON EXAM AND FOOD TEXTURES ON THIS DIET ACCOMMODATES HER ORAL DYSPHAGIC SEQUEL.

## 2025-04-11 NOTE — DIETITIAN INITIAL EVALUATION ADULT - NSFNSPHYEXAMSKINFT_GEN_A_CORE
Pressure Injury 1: Right:, heel, Stage I  Pressure Injury 2: Left:, heel, none  Pressure Injury 3: Right:, buttocks, Stage II  Pressure Injury 4: Left:, buttocks, Stage II  Pressure Injury 5: coccyx, Stage II  Pressure Injury 6: Left:, first toe, Suspected deep tissue injury    Jonathan Score 15. Pressure Injury 1: Right:, heel, Stage I  Pressure Injury 2: Left:, heel, none  Pressure Injury 3: Right:, buttocks, Stage II  Pressure Injury 4: Left:, buttocks, Stage II  Pressure Injury 5: coccyx, Stage II  Pressure Injury 6: Left:, first toe, Suspected deep tissue injury    Jonathan Score 10.

## 2025-04-11 NOTE — SWALLOW BEDSIDE ASSESSMENT ADULT - ASR SWALLOW RECOMMEND DIAG
DEFER MBS GIVEN OBSERVED CLINICAL TOLERANCE TO SUGGESTED FOOD CONSISTENCIES FROM AN OROPHARYNGEAL SWALLOWING STANCE ON EXAM

## 2025-04-11 NOTE — CONSULT NOTE ADULT - SUBJECTIVE AND OBJECTIVE BOX
HPI: Pt is a 89y old Female with hx of PE, DVT, HTN, dyslipidemia, colonc ancer s/p partial colectomy w/ colostomy, ovarian Ca s/p ORA, glaucoma Brought in by EMS for decreased mental status, decreased oral intake, dark urine progressively worsening over the last few days according to family. She is usually A+Ox2 per family. In the ER found to hypothermic, hypotensive, bradycardic. Found to have +UA, give 3L IVF and started on vasopressors.     Unable to obtain history from patient due to clinical condition.       Palliative consulted for [facility].    : Seen and examined at bedside. Alert, oriented x2. Denies pain, sob. On room air in NAD. Remains on Levophed, per RN being titrated down. Son, Bhupinder at bedside., Will reach out to daughter, Barak, as she is alternate HCP.        PAIN: ( )Yes   (X)No  denies     DYSPNEA: ( ) Yes  (X) No  denies, on room air in NAD      PAST MEDICAL & SURGICAL HISTORY:  Pulmonary embolism        SOCIAL HX:    Hx opiate tolerance ( )YES  ( )NO    Baseline ADLs  (Prior to Admission)  ( ) Independent   (X)Dependent    FAMILY HISTORY:      Review of Systems:    Unable to obtain/Limited due to: speech limited, poor historian      PHYSICAL EXAM:    Vital Signs Last 24 Hrs  T(C): 35.9 (2025 10:00), Max: 35.9 (2025 09:00)  T(F): 96.6 (2025 10:00), Max: 96.6 (2025 09:00)  HR: 99 (2025 10:00) (51 - 132)  BP: 103/59 (2025 10:00) (74/43 - 134/90)  BP(mean): 71 (2025 10:00) (48 - 103)  RR: 19 (2025 10:00) (16 - 31)  SpO2: 97% (2025 10:00) (91% - 100%)    Parameters below as of 2025 10:00  Patient On (Oxygen Delivery Method): room air      Daily Height in cm: 165.1 (10 Apr 2025 11:06)    Daily Weight in k.7 (2025 06:05)    PPSV2:  30%  FAST:    General: alert, weak-appearing, in NAD  Mental Status: A&Ox2  HEENT: b/l temporal wasting  Lungs: cta b/l; poor inspiratory effort  Cardiac: tachycardic  GI: abdomen soft, nontender with active bs x4  : mejia draining dark davonte urine  Ext: no edema  Neuro: A&Ox2.       LABS:                        11.4   12.57 )-----------( 203      ( 2025 04:37 )             34.0     04-11    142  |  115[H]  |  78[H]  ----------------------------<  206[H]  3.4[L]   |  17[L]  |  2.09[H]    Ca    8.8      2025 04:37  Phos  3.0     11  Mg     2.0     11    TPro  5.3[L]  /  Alb  2.1[L]  /  TBili  0.3  /  DBili  x   /  AST  20  /  ALT  36  /  AlkPhos  90  04-11    PT/INR - ( 10 Apr 2025 18:48 )   PT: 21.8 sec;   INR: 1.91 ratio         PTT - ( 10 Apr 2025 18:48 )  PTT:31.3 sec  Albumin: Albumin: 2.1 g/dL ( @ 04:37)      Allergies    No Known Drug Allergies  Originally Entered as [Rash] reaction to [adhesive tape] (Unknown)    Intolerances      MEDICATIONS  (STANDING):  chlorhexidine 2% Cloths 1 Application(s) Topical <User Schedule>  heparin  Infusion.  Unit(s)/Hr (11 mL/Hr) IV Continuous <Continuous>  midodrine 10 milliGRAM(s) Oral every 8 hours  norepinephrine Infusion 0.05 MICROgram(s)/kG/Min (6.02 mL/Hr) IV Continuous <Continuous>  pantoprazole  Injectable 40 milliGRAM(s) IV Push daily  piperacillin/tazobactam IVPB.. 3.375 Gram(s) IV Intermittent every 12 hours  sodium bicarbonate  Infusion 0.234 mEq/kG/Hr (100 mL/Hr) IV Continuous <Continuous>    MEDICATIONS  (PRN):  heparin   Injectable 5000 Unit(s) IV Push every 6 hours PRN For aPTT less than 40  heparin   Injectable 2500 Unit(s) IV Push every 6 hours PRN For aPTT between 40 - 57      RADIOLOGY/ADDITIONAL STUDIES:  < from: CT Abdomen and Pelvis No Cont (04.10.25 @ 15:46) >    ACC: [id number] EXAM:  CT ABDOMEN AND PELVIS   ORDERED BY: NICA BARROS     PROCEDURE DATE:  04/10/2025          INTERPRETATION:  CLINICAL INFORMATION: urosepsis r/o stone, possibly   hydro on POCUS JCT    COMPARISON: 5.24.22.    CONTRAST/COMPLICATIONS:  IV Contrast: None  Oral Contrast: None  Complications: None  .    PROCEDURE:  CT of the Abdomen and Pelvis was performed.  Sagittal and coronal reformats were performed.    FINDINGS:    LOWER CHEST: Within normal limits.    LIVER: Within normal limits.  BILE DUCTS: Normal caliber.  GALLBLADDER: Cholelithiasis.  SPLEEN: Within normal limits.  PANCREAS: Within normal limits.  ADRENALS: Within normal limits.  KIDNEYS/URETERS: Cysts and other lesions too small to characterize.    Renal atrophy. No hydronephrosis.    BLADDER: Within normal limits.  REPRODUCTIVE ORGANS: Hysterectomy. No adnexal mass.    BOWEL: No bowel obstruction. The appendix is normal.  Loop colostomy is   noted. A rectosigmoid anastomosis. Metallic density left hemiabdomen  again noted.  PERITONEUM/RETROPERITONEUM: Within normal limits.  VESSELS:  Within normal limits.  LYMPH NODES: Within normal limits.  ABDOMINAL WALL: Within normal limits.  BONES: Degenerative changes.    IMPRESSION: No hydronephrosis.        --- End of Report ---            JOSE ARMANDO PETERSON MD; Attending Radiologist  This document has been electronically signed. Apr 10 2025  4:11PM    < end of copied text >    < from: Xray Chest 1 View-PORTABLE IMMEDIATE (04.10.25 @ 13:15) >    ACC: [id number] EXAM:  XR CHEST PORTABLE IMMED 1V   ORDERED BY: FAHAD AUGUSTINE     PROCEDURE DATE:  04/10/2025          INTERPRETATION:  AP chest on April 10, 2025 at 12:16 PM. Patient has   sepsis.    Heart magnified by technique.    There is a small process at the left base increased from March 10.    IMPRESSION: Small process at left base increased from prior.    --- End of Report ---            REYES LIVINGSTON MD; Attending Radiologist  This document has been electronically signed. Apr 10 2025  1:42PM    < end of copied text >    < from: CT Head No Cont (04.10.25 @ 12:44) >    ACC: [id number] EXAM:  CT BRAIN   ORDERED BY: FAHAD AUGUSTINE     PROCEDURE DATE:  04/10/2025          INTERPRETATION:  CLINICAL INFORMATION: Progressively worsening weakness   and AMS.    COMPARISON: CT brain 3/10/2025.    CONTRAST:  IV Contrast:      TECHNIQUE:  Serial axial images were obtained from the skull base to the   vertex using multi-slice helical technique. Sagittal and coronal   reformats were obtained.    FINDINGS:    Multiple images degraded by motion, limiting evaluation.    VENTRICLES AND SULCI: Again seen is age-appropriate atrophy.  INTRA-AXIAL: No gross evidence of mass effect, acute hemorrhage, or   midline shift.  No gross acute territorial infarct. Subcentimeter chronic   lacunar infarcts versus perivascular spaces bilateral basal ganglia.   Stable white matter hypodensities; a nonspecific finding which   statistically reflects chronic microvascular ischemic change.  EXTRA-AXIAL: Stable approximate 1 cm sessile, partially calcified nodule   left frontal region, likely meningioma. No underlying mass effect. Basal   cisterns are normal in appearance.    VISUALIZED SINUSES:  Scattered mucosal thickening, without air-fluid   level. Rightward deviation nasal septum.  TYMPANOMASTOID CAVITIES:  Again seen is partial opacification right   greater than left mastoid air cells. No middle ear effusion.  VISUALIZED ORBITS: Bilateral lens replacement.  CALVARIUM: Intact.    MISCELLANEOUS: Partial empty sella.    IMPRESSION:  1. No significant change.  2. No evidence of acutehemorrhage, territorial infarct or mass effect.  3. Additional findings as above, including senescent changes and partial   opacification right greater than left mastoid air cells.    --- End of Report ---            MEETA ACUNA M.D., ATTENDING RADIOLOGIST  This document has been electronically signed. Apr 10 2025 12:52PM    < end of copied text >

## 2025-04-11 NOTE — SWALLOW BEDSIDE ASSESSMENT ADULT - SWALLOW EVAL: DIAGNOSIS
1) The pt's willingness to accept PO is behaviorally reduced at times & oral aperture is decreased when she is willing to accept PO. During intakes, the pt exhibits Oral Dysphagia of mild to moderate severity which subjectively appears to be a grossly functional condition with a restricted inventory of modified food textures. Her underlying pharyngeal integrity subjectively appeared to be grossly within functional parameters for age. NO behavioral aspiration signs exhibited with thin liquids, mildly thick liquids, puree foods, minced/moist foods & soft/bite size foods but oral processing excessively prolonged with the latter food consistency. Coarse solids not offered given severity of pt's Oral Dysphagia. Pt's Oral Dysphagia is in setting of acute medical deconditioning(i.e urosepsis, hypothermia, hypotension, bradycardia, hypoalbuminemia, encephalopathy, etc) atop advanced age(89). Unclear if pt also with an underlying h/o Dementia which can be contributory as well.

## 2025-04-11 NOTE — DIETITIAN INITIAL EVALUATION ADULT - ETIOLOGY
r/t decreased ability to meet increased nutrient needs 2/2 advanced age, wound healing, poor appetite, septic shock

## 2025-04-11 NOTE — DIETITIAN INITIAL EVALUATION ADULT - PERTINENT LABORATORY DATA
04-11    142  |  115[H]  |  78[H]  ----------------------------<  206[H]  3.4[L]   |  17[L]  |  2.09[H]    Ca    8.8      11 Apr 2025 04:37  Phos  3.0     04-11  Mg     2.0     04-11    TPro  5.3[L]  /  Alb  2.1[L]  /  TBili  0.3  /  DBili  x   /  AST  20  /  ALT  36  /  AlkPhos  90  04-11  POCT Blood Glucose.: 140 mg/dL (04-10-25 @ 11:06)

## 2025-04-11 NOTE — DIETITIAN NUTRITION RISK NOTIFICATION - TREATMENT: THE FOLLOWING DIET HAS BEEN RECOMMENDED
Diet, NPO:   Except Medications     Special Instructions for Nursing:  Except Medications (04-10-25 @ 15:27) [Active]

## 2025-04-11 NOTE — CONSULT NOTE ADULT - STAFF/PROVIDER
Referred by: Angela Anderson DO; Medical Diagnosis (from order):    Diagnosis Information      Diagnosis    724.2 (ICD-9-CM) - M54.5, M79.605 (ICD-10-CM) - Lumbar pain with radiation down left leg                Physical Therapy -  Daily Treatment Note    Visit:  8     SUBJECTIVE                                                                                                             I have been so sick. Taking antibiotics for a sinus infection.   Some increased leg cramping because she has not been as active as she had been before getting sick.  She is seeing a podiatrist tomorrow to rule out any specific foot/toe issues unrelated to her back.       Pain / Symptoms:  Pain rating (out of 10): Current: 3     OBJECTIVE                                                                                                                          TREATMENT                                                                                                                initial evaluation completed  Therapeutic Exercise:  Repeated extension against table.  Reviewed plank, stretches  Dipping bird  Hip flexor stretch*- reviewed  Sitting piriformis stretch*  Standing back extension x5  Modified plank on table*  Bridge with swiss ball  Side stepping *     Skilled input: verbal instruction/cues and posture correction      ASSESSMENT                                                                                                             No significant change with medication prescribed by MD initially.  Ongoing left hamstring cramping with contraction in shortened range.  She felt improvement after gentle nerve tension and dipping bird exercise.    Pain/symptoms after session: 3  Patient Education:   Results of above outlined education: Verbalizes understanding and Demonstrates understanding    GOALS                                                                                                                       Long Term Goals: To  be met by end of plan of care:      Home Exercise Program: Independent with progressed and modified home exercise program (HEP)      Status:  Progressing/ongoing    Pain: Decrease pain/symptoms to 1    Status:  Progressing/ongoing  Strength: Improve involved strength to  5    Status:  Progressing/ongoing    Ambulation: Demonstrate ability to negotiate level and unlevel surfaces at variable velocities, including change of direction without increased pain or instability to return to age appropriate and community activities at prior level of function     Status:  Progressing/ongoing    Bend/Squat: Bend/squat for activities of daily living and instrumental activites of daily living with reported manageable/tolerable difficulty     Status:  Progressing/ongoing    Patient Reported Outcome Measure: Improvement in function /disability/impairment as indicated by Oswestry (minimal detectable change - 12%) , or =   5     Status:  Progressing/ongoing      Procedures and total treatment time documented Time Entry flowsheet.     Anh Morales NP

## 2025-04-11 NOTE — SWALLOW BEDSIDE ASSESSMENT ADULT - SLP GENERAL OBSERVATIONS
On encounter, an O2 cannula & warming blanket were in place. The pt was awake. Her affect was flat. Pt was interactive but communicatively passive. The pt was able to responsively verbalize during low structured personally relevant Q/A dyads without evidence of a samuel primary speech-language pathology. However, pt's articulatory/phonatory effort were reduced when she attempted to speak resulting in decreased vocal intensity. Moreover, her verbalizations were often brief/vague & variably reflective of decreased memory/insight indicative of Cognitive Dysfunction. Pt's reduced articulatory/phonatory effort when attempting to speak as well as her Cognitive Dysfunction are in setting of acute medical deconditioning(i.e urosepsis, hypothermia, hypotension, bradycardia, hypoalbuminemia, encephalopathy, etc) atop advanced age(89). Unclear if pt with any underlying h/o Dementia. In any case, pt's response effectiveness during Q/A dyads varied from 60% to 80%. Volumizing cues, verbal elaboration cues and cognitive redirection cues were of mild transient benefit.

## 2025-04-11 NOTE — SWALLOW BEDSIDE ASSESSMENT ADULT - NS SPL SWALLOW CLINIC TRIAL FT
The pt's willingness to accept PO is behaviorally reduced at times & oral aperture was decreased when she was willing to accept PO. During intakes, the pt exhibited Oral Dysphagia of mild to moderate severity which subjectively appeared to be a grossly functional condition with a restricted inventory of modified food textures. Bolus formation/transfer were mildly to moderately prolonged and discontinuous but felt to be grossly mechanically functional with multiple modified food consistencies. Piecemeal deglutition was evident. Mild+ tongue debris noted with minced/moist foods. Her underlying pharyngeal integrity subjectively appeared to be grossly within functional parameters for age. Swallow triggered in an acceptable time frame for age and laryngeal lift on palpation during swallowing trials was felt to be grossly functional for age as well. NO behavioral aspiration signs exhibited with thin liquids, mildly thick liquids, puree foods, minced/moist foods & soft/bite size foods but oral processing was excessively prolonged with the latter food consistency. Coarse solids not offered given the severity of pt's Oral Dysphagia. Suggest a Minced and Moist Diet with Thin Liquids to accommodate pt's oral dysphagic sequel. Pt's Oral Dysphagia is in setting of acute medical deconditioning(i.e urosepsis, hypothermia, hypotension, bradycardia, hypoalbuminemia, encephalopathy, etc) atop advanced age(89). Unclear if pt also with an underlying h/o Dementia which can be contributory as well.

## 2025-04-11 NOTE — CONSULT NOTE ADULT - ASSESSMENT
Pt is a 89y old Female with hx of PE, DVT, HTN, dyslipidemia, colonc ancer s/p partial colectomy w/ colostomy, ovarian Ca s/p ORA, glaucoma Brought in by EMS for decreased mental status, decreased oral intake, dark urine progressively worsening over the last few days according to family. She is usually A+Ox2 per family. In the ER found to hypothermic, hypotensive, bradycardic. Found to have +UA, give 3L IVF and started on vasopressors.     1. Urosepsis  -currently in CCU requiring vasopressors  -on midodrine  -IV abx  -creatinine improving, now 2.09 today  -MOLST on chart with DNR/DNI trial NIV    2. Severe protein calorie malnutrition  -appreciate dietary input  -NPO, awaiting SLP eval      Process of Care    --Reviewed dx/treatment problems and alignment with Goals of Care      Physical Aspects of Care    --Pain    patient denies at this time    c/w current management      --Bowel Regimen    risk for constipation d/t immobility    daily dulcolax as needed      --Dyspnea    No SOB at this time    on room air  comfortable and in NAD      --Nausea Vomiting    denies      --Weakness    PT as tolerated           Psychological and Psychiatric Aspects of Care:     --Grief/ Bereavement: emotional support provided    --Hx of psychiatric dx: none    --Pastoral Care Available PRN           Social Aspects of Care    --SW involved    Cultural Aspects    --Primary Language: English            Goals of Care:   see GOC documented above          We discussed Palliative Care team being a supportive team when a patient has ongoing illnesses.  We also discussed that it is not an end of life care service, but can help navigate symptoms and emotional support throughout their hospital stay here.            Ethical and Legal Aspects: NA     Capacity-  A&Ox2 does not appear to have medical decision making capacity at time of my assessment    HCP/Surrogate:   Barak Puga, daughter [phone]          Code Status:  DNR/DNI trial NIV    MOLST:  MOLST from 4/10/25 with limitations of DNR/DNI trial NIV    Dispo Plan:  TBD, remains critically ill in ICU               Discussed With: Dr. Hart coordinated with attending and SW and RN            Time Spent: 60 minutes including the care, coordination and counseling of this patient, 50% of which was spent coordinating and counseling.

## 2025-04-11 NOTE — DIETITIAN INITIAL EVALUATION ADULT - NSFNSGIIOFT_GEN_A_CORE
I&O's Detail    10 Apr 2025 07:01  -  11 Apr 2025 07:00  --------------------------------------------------------  IN:    IV PiggyBack: 100 mL    Norepinephrine: 92 mL    Sodium Bicarbonate: 1312 mL  Total IN: 1504 mL    OUT:    Indwelling Catheter - Urethral (mL): 750 mL  Total OUT: 750 mL    Total NET: 754 mL

## 2025-04-11 NOTE — DIETITIAN INITIAL EVALUATION ADULT - ENTER FROM (G/KG)
From: Rowena Ho  To: Holly Shipley NP  Sent: 3/3/2020 9:17 PM EST  Subject: Prescription Question    I have an appointment scheduled for 3/16 but there is no refill on the [facility] which runs out next week. Can you please prescribe. Thanks!
1.4

## 2025-04-11 NOTE — DIETITIAN INITIAL EVALUATION ADULT - NSFNSGIASSESSMENTFT_GEN_A_CORE
(4/11) No BM doc'd; pt is not receiving bowel regimen.  (4/11) No BM doc'd + colostomy; pt is not receiving bowel regimen.

## 2025-04-11 NOTE — DIETITIAN INITIAL EVALUATION ADULT - OTHER INFO
89 y/o F with a PMHx of PE, DVT, HTN, dyslipidemia, colon cancer s/p partial colectomy w/ colostomy, ovarian Ca s/p ORA, glaucoma Brought in by EMS for decreased mental status, decreased oral intake, dark urine progressively worsening over the last few days according to family. She is usually A+Ox2 per family. In the ER found to hypothermic, hypotensive, bradycardic. Found to have +UA, give 3L IVF and started on vasopressors. Admitted for septic shock, ARF, metabolic acidosis, urosepsis, and protein caloric malnutrition; admitted to CCU. Pt is DNR/DNI, however TF IS NOT ADDRESSED IN MOLST.    Unable to obtain meaningful information 2/2 pt's mental status; NPO at time of visit. RD obtained bedscale wt on 4/11 - 140#. Weight hx reviewed: 150# (taken by RN on 3/10/25); weight loss of 10# (6.7%) x 1 mon - clinsig and severe. Pt very thin, frail appearing. NFPE limited d/t medical equipment, however reveals moderate to severe muscle/ fat wasting. Pt meets criteria for PCM at this time. Discussed in IDR this morning, SLP consulted d/t granddaughter noting trouble swallowing. Recommend to advance diet to Regular + SLP recs as soon as medically feasible. Will trial Ensure Plus High Protein BID in effort to optimize PO intake. Please see additional recommendations below.

## 2025-04-11 NOTE — DIETITIAN INITIAL EVALUATION ADULT - ADD RECOMMEND
1) Advance diet to Regular + SLP recs as soon as medically feasible  2) Add ensure plus high protein BID to optimize PO intake (provides 350 kcal, 20g protein/ shake) when diet is advanced  3) consider checking B6, B12, thiamine, folate, carnitine, and copper levels as malnutrition in cause these to be deficient  4) Please obtain daily weights  5) Consider adding thiamine 100 mg daily 2/2 poor PO intake/ malnutrition  6) MVI w/ minerals daily to ensure 100% RDA met  7) Encourage protein-rich foods, maximize food preferences  8) Monitor bowel movements, if no BM for >3 days, consider implementing bowel regimen.  9) Confirm goals of care regarding nutrition support - Nutrition support is not recommended due to overall declining medical status which evidenced based studies indicate EN is not effective in prolonging survival and improving quality of life. It can also increase risk of aspiration pneumonia as well as other related issues (infection, GI complications, and worsening/ non-healing PI's). However, will provide nutrition/ hydration within GOC.   Please see additional recommendations below.

## 2025-04-11 NOTE — CONSULT NOTE ADULT - CONVERSATION DETAILS
Team met with pt. and pts son Bhupinder at bedside. Pt. is alert but confused at times. Pt. was living with Bhupinder prior to admission. Pt. was walking minimally with RW and pts daughter would help with showering her. Pts son is not HCP, pts daughter Barak is. Pts son had expressed he wanted pt. resuscitated and intubated however, team explained that the decision is his sisters as she is HCP. Pts son was understanding of this. Pts sister had put DNR/DNI in place. We informed him we would call her.     We spoke with pts HCP/Daughter Barak via phone. She shared that pts wishes were always DNR/DNI and she is respecting her wishes. Pts daughter confirmed DNR/DNI that is on chart. She discussed how there are some disagreements with her brother but she is going by what pt. wanted and she has the ability to make the decision. We discussed giving pt. the weekend to see how she does. [facility] VS Hospice and comfort focus discussed depending on if pt. improves or declines further. Hospice services and philosophy explained. Team explained we will follow up on Monday to further determined plan based on how pt. is doing. Pts daughter expressed understanding. Feelings explored and support provided.    Plan to continue with current medical management to see if pt. improves or declines. Emotional support provided. Our team will continue to follow.

## 2025-04-11 NOTE — GOALS OF CARE CONVERSATION - ADVANCED CARE PLANNING - CONVERSATION DETAILS
HPI:  89 y/o F w/ PMH of PE, DVT, HTN, dyslipidemia, colonc ancer s/p partial colectomy w/ colostomy, ovarian Ca s/p ORA, glaucoma Brought in by EMS for decreased mental status, decreased oral intake, dark urine progressively worsening over the last few days according to family.  (10 Apr 2025 15:59)      PERTINENT PMH REVIEWED:  [ X ] YES [ ] NO           Primary Contact:  Pts daughter Barak Young             HCP [  X ] Surrogate [   ] Guardian [   ]    Mental Status: [ ] Alert  [  ] Oriented [  ] Confused [ X  ] Lethargic [  ]  Concerns of Depression [  ]- Unable to assess, not identified by family   Anxiety [   ]- Unable to assess, not identified by family   Baseline ADLs (prior to admission):  Independent [ ] moderately [ ] fully   Dependent   [ ] moderately [ ]fully    Anticipated Grief: Patient [  ] Family [  ]    Caregiver [place] Assessed: Yes [  ] No [  ]    Temple:    Spirtual Concerns:    Goals of Care: Comfort [  ] Rehabiltation [  ] Curative [  ] Life Prolonging [  ]    Previous Services:    ADVANCE DIRECTIVES:  [ ] YES [ ] NO   DNR [ ] YES [ ] NO  DNI [ ] YES [ ] NO Completed on:                     MOLST  [ ] YES [ ] NO   Completed on:  Living Will  [ ] YES [ ] NO   Completed on:    Anticipated D/C Plan:                     Summary: HPI:  89 y/o F w/ PMH of PE, DVT, HTN, dyslipidemia, colonc ancer s/p partial colectomy w/ colostomy, ovarian Ca s/p ORA, glaucoma Brought in by EMS for decreased mental status, decreased oral intake, dark urine progressively worsening over the last few days according to family.  (10 Apr 2025 15:59)      PERTINENT PMH REVIEWED:  [ X ] YES [ ] NO           Primary Contact:  Pts daughter Barak Young             HCP [  X ] Surrogate [   ] Guardian [   ]    Mental Status: [ ] Alert  [  ] Oriented [  ] Confused [ X  ] Lethargic [  ]  Concerns of Depression [  ]- Unable to assess, not identified by family   Anxiety [   ]- Unable to assess, not identified by family   Baseline ADLs (prior to admission):  Independent [ ] moderately [ ] fully   Dependent   [ X ] moderately [ ] fully    Anticipated Grief: Patient [  ] Family [  X  ]    Caregiver [place] Assessed: Yes [ X ] No [  ]    Mandaen: Rastafari     Spiritual Concerns: Pts daughter requesting a - team put in for this     Goals of Care: Continue with current medical management     Previous Services: None    ADVANCE DIRECTIVES:  DNR/DNI Trial NIV    Anticipated D/C Plan: to be further determined based on pts needs                     Summary:    Team met with pt. and pts son Bhupinder at bedside. Pt. is alert but confused at times. Pt. was living with Bhupinder prior to admission. Pt. was walking minimally with RW and pts daughter would help with showering her. Pts son is not HCP, pts daughter Barak is. Pts son had expressed he wanted pt. resuscitated and intubated however, team explained that the decision is his sisters as she is HCP. Pts son was understanding of this. Pts sister had put DNR/DNI in place. We informed him we would call her.     We spoke with pts HCP/Daughter Barak via phone. She shared that pts wishes were always DNR/DNI and she is respecting her wishes. Pts daughter confirmed DNR/DNI that is on chart. She discussed how there are some disagreements with her brother but she is going by what pt. wanted and she has the ability to make the decision. We discussed giving pt. the weekend to see how she does. [facility] VS Hospice and comfort focus discussed depending on if pt. improves or declines further. Hospice services and philosophy explained. Team explained we will follow up on Monday to further determined plan based on how pt. is doing. Pts daughter expressed understanding. Feelings explored and support provided.    Plan to continue with current medical management to see if pt. improves or declines. Emotional support provided. Our team will continue to follow.

## 2025-04-11 NOTE — SWALLOW BEDSIDE ASSESSMENT ADULT - SWALLOW EVAL: PROGNOSIS
2) On encounter, an O2 cannula & warming blanket were in place. The pt was awake. Her affect was flat. Pt was interactive but communicatively passive. The pt was able to verbalize during communicative probes without evidence of a samuel primary speech-language pathology. However, pt's articulatory/phonatory effort were reduced when she attempted to speak resulting in decreased vocal intensity. Moreover, her verbalizations were often brief/vague & variably reflective of decreased memory/insight indicative of Cognitive Dysfunction. Pt's reduced effort when attempting to speak as well as her Cognitive Dysfunction are in setting of acute medical deconditioning(i.e urosepsis, hypothermia, hypotension, bradycardia, hypoalbuminemia, encephalopathy, etc) atop advanced age(89). Unclear if pt with any underlying h/o Dementia.

## 2025-04-12 LAB
ALBUMIN SERPL ELPH-MCNC: 1.8 G/DL — LOW (ref 3.3–5)
ALP SERPL-CCNC: 79 U/L — SIGNIFICANT CHANGE UP (ref 40–120)
ALT FLD-CCNC: 28 U/L — SIGNIFICANT CHANGE UP (ref 12–78)
ANION GAP SERPL CALC-SCNC: 8 MMOL/L — SIGNIFICANT CHANGE UP (ref 5–17)
APTT BLD: 134.1 SEC — CRITICAL HIGH (ref 24.5–35.6)
APTT BLD: > 200 SEC (ref 24.5–35.6)
AST SERPL-CCNC: 23 U/L — SIGNIFICANT CHANGE UP (ref 15–37)
BILIRUB SERPL-MCNC: 0.3 MG/DL — SIGNIFICANT CHANGE UP (ref 0.2–1.2)
BUN SERPL-MCNC: 61 MG/DL — HIGH (ref 7–23)
CALCIUM SERPL-MCNC: 8.3 MG/DL — LOW (ref 8.5–10.1)
CHLORIDE SERPL-SCNC: 115 MMOL/L — HIGH (ref 96–108)
CO2 SERPL-SCNC: 20 MMOL/L — LOW (ref 22–31)
CREAT SERPL-MCNC: 1.24 MG/DL — SIGNIFICANT CHANGE UP (ref 0.5–1.3)
EGFR: 40 ML/MIN/1.73M2 — LOW
EGFR: 40 ML/MIN/1.73M2 — LOW
GLUCOSE SERPL-MCNC: 117 MG/DL — HIGH (ref 70–99)
HCT VFR BLD CALC: 25.6 % — LOW (ref 34.5–45)
HCT VFR BLD CALC: 28.5 % — LOW (ref 34.5–45)
HGB BLD-MCNC: 8.7 G/DL — LOW (ref 11.5–15.5)
HGB BLD-MCNC: 9.9 G/DL — LOW (ref 11.5–15.5)
LACTATE SERPL-SCNC: 1.5 MMOL/L — SIGNIFICANT CHANGE UP (ref 0.7–2)
MAGNESIUM SERPL-MCNC: 1.5 MG/DL — LOW (ref 1.6–2.6)
MCHC RBC-ENTMCNC: 32.6 PG — SIGNIFICANT CHANGE UP (ref 27–34)
MCHC RBC-ENTMCNC: 32.8 PG — SIGNIFICANT CHANGE UP (ref 27–34)
MCHC RBC-ENTMCNC: 34 G/DL — SIGNIFICANT CHANGE UP (ref 32–36)
MCHC RBC-ENTMCNC: 34.7 G/DL — SIGNIFICANT CHANGE UP (ref 32–36)
MCV RBC AUTO: 94.4 FL — SIGNIFICANT CHANGE UP (ref 80–100)
MCV RBC AUTO: 95.9 FL — SIGNIFICANT CHANGE UP (ref 80–100)
NRBC # BLD AUTO: 0.03 K/UL — HIGH (ref 0–0)
NRBC # BLD AUTO: 0.04 K/UL — HIGH (ref 0–0)
NRBC # FLD: 0.03 K/UL — HIGH (ref 0–0)
NRBC # FLD: 0.04 K/UL — HIGH (ref 0–0)
NRBC BLD AUTO-RTO: 0 /100 WBCS — SIGNIFICANT CHANGE UP (ref 0–0)
NRBC BLD AUTO-RTO: 0 /100 WBCS — SIGNIFICANT CHANGE UP (ref 0–0)
PHOSPHATE SERPL-MCNC: 4 MG/DL — SIGNIFICANT CHANGE UP (ref 2.5–4.5)
PLATELET # BLD AUTO: 147 K/UL — LOW (ref 150–400)
PLATELET # BLD AUTO: 163 K/UL — SIGNIFICANT CHANGE UP (ref 150–400)
PMV BLD: 10 FL — SIGNIFICANT CHANGE UP (ref 7–13)
PMV BLD: 9.9 FL — SIGNIFICANT CHANGE UP (ref 7–13)
POTASSIUM SERPL-MCNC: 3.7 MMOL/L — SIGNIFICANT CHANGE UP (ref 3.5–5.3)
POTASSIUM SERPL-SCNC: 3.7 MMOL/L — SIGNIFICANT CHANGE UP (ref 3.5–5.3)
PROT SERPL-MCNC: 4.6 GM/DL — LOW (ref 6–8.3)
RBC # BLD: 2.67 M/UL — LOW (ref 3.8–5.2)
RBC # BLD: 3.02 M/UL — LOW (ref 3.8–5.2)
RBC # FLD: 14.6 % — HIGH (ref 10.3–14.5)
RBC # FLD: 15 % — HIGH (ref 10.3–14.5)
SODIUM SERPL-SCNC: 143 MMOL/L — SIGNIFICANT CHANGE UP (ref 135–145)
WBC # BLD: 10.24 K/UL — SIGNIFICANT CHANGE UP (ref 3.8–10.5)
WBC # BLD: 11.09 K/UL — HIGH (ref 3.8–10.5)
WBC # FLD AUTO: 10.24 K/UL — SIGNIFICANT CHANGE UP (ref 3.8–10.5)
WBC # FLD AUTO: 11.09 K/UL — HIGH (ref 3.8–10.5)

## 2025-04-12 PROCEDURE — 99291 CRITICAL CARE FIRST HOUR: CPT

## 2025-04-12 RX ORDER — ACETAMINOPHEN 500 MG/5ML
1000 LIQUID (ML) ORAL ONCE
Refills: 0 | Status: COMPLETED | OUTPATIENT
Start: 2025-04-12 | End: 2025-04-12

## 2025-04-12 RX ORDER — SODIUM CHLORIDE 9 G/1000ML
1000 INJECTION, SOLUTION INTRAVENOUS
Refills: 0 | Status: DISCONTINUED | OUTPATIENT
Start: 2025-04-12 | End: 2025-04-12

## 2025-04-12 RX ORDER — MAGNESIUM SULFATE 500 MG/ML
2 SYRINGE (ML) INJECTION ONCE
Refills: 0 | Status: DISCONTINUED | OUTPATIENT
Start: 2025-04-12 | End: 2025-04-12

## 2025-04-12 RX ORDER — DIPHENHYDRAMINE HYDROCHLORIDE AND LIDOCAINE HYDROCHLORIDE AND ALUMINUM HYDROXIDE AND MAGNESIUM HYDRO
5 KIT EVERY 6 HOURS
Refills: 0 | Status: DISCONTINUED | OUTPATIENT
Start: 2025-04-12 | End: 2025-04-16

## 2025-04-12 RX ORDER — ALBUMIN (HUMAN) 12.5 G/50ML
100 INJECTION, SOLUTION INTRAVENOUS EVERY 8 HOURS
Refills: 0 | Status: COMPLETED | OUTPATIENT
Start: 2025-04-12 | End: 2025-04-12

## 2025-04-12 RX ORDER — MAGNESIUM SULFATE 500 MG/ML
2 SYRINGE (ML) INJECTION ONCE
Refills: 0 | Status: COMPLETED | OUTPATIENT
Start: 2025-04-12 | End: 2025-04-12

## 2025-04-12 RX ADMIN — HEPARIN SODIUM 0 UNIT(S)/HR: 1000 INJECTION INTRAVENOUS; SUBCUTANEOUS at 09:09

## 2025-04-12 RX ADMIN — HEPARIN SODIUM 700 UNIT(S)/HR: 1000 INJECTION INTRAVENOUS; SUBCUTANEOUS at 02:29

## 2025-04-12 RX ADMIN — Medication 20 MILLIEQUIVALENT(S): at 10:41

## 2025-04-12 RX ADMIN — MIDODRINE HYDROCHLORIDE 10 MILLIGRAM(S): 5 TABLET ORAL at 21:21

## 2025-04-12 RX ADMIN — Medication 25 GRAM(S): at 12:00

## 2025-04-12 RX ADMIN — Medication 1 APPLICATION(S): at 09:11

## 2025-04-12 RX ADMIN — NOREPINEPHRINE BITARTRATE 6.02 MICROGRAM(S)/KG/MIN: 8 SOLUTION at 16:50

## 2025-04-12 RX ADMIN — SODIUM CHLORIDE 75 MILLILITER(S): 9 INJECTION, SOLUTION INTRAVENOUS at 06:40

## 2025-04-12 RX ADMIN — HEPARIN SODIUM 0 UNIT(S)/HR: 1000 INJECTION INTRAVENOUS; SUBCUTANEOUS at 01:24

## 2025-04-12 RX ADMIN — ALBUMIN (HUMAN) 50 MILLILITER(S): 12.5 INJECTION, SOLUTION INTRAVENOUS at 14:29

## 2025-04-12 RX ADMIN — POTASSIUM PHOSPHATE, MONOBASIC POTASSIUM PHOSPHATE, DIBASIC INJECTION, 83.33 MILLIMOLE(S): 236; 224 SOLUTION, CONCENTRATE INTRAVENOUS at 01:13

## 2025-04-12 RX ADMIN — MIDODRINE HYDROCHLORIDE 10 MILLIGRAM(S): 5 TABLET ORAL at 05:46

## 2025-04-12 RX ADMIN — MIDODRINE HYDROCHLORIDE 10 MILLIGRAM(S): 5 TABLET ORAL at 15:06

## 2025-04-12 RX ADMIN — ALBUMIN (HUMAN) 50 MILLILITER(S): 12.5 INJECTION, SOLUTION INTRAVENOUS at 09:37

## 2025-04-12 RX ADMIN — Medication 40 MILLIGRAM(S): at 09:36

## 2025-04-12 RX ADMIN — Medication 25 GRAM(S): at 06:39

## 2025-04-12 RX ADMIN — Medication 400 MILLIGRAM(S): at 20:30

## 2025-04-12 RX ADMIN — Medication 25 GRAM(S): at 00:46

## 2025-04-12 RX ADMIN — ALBUMIN (HUMAN) 50 MILLILITER(S): 12.5 INJECTION, SOLUTION INTRAVENOUS at 21:20

## 2025-04-12 RX ADMIN — HEPARIN SODIUM 500 UNIT(S)/HR: 1000 INJECTION INTRAVENOUS; SUBCUTANEOUS at 10:13

## 2025-04-12 NOTE — PROGRESS NOTE ADULT - ASSESSMENT
89 y/o F w/ PMH of PE, DVT, pA.fib on apixaban, HTN, dyslipidemia, colon cancer s/p partial colectomy w/ colostomy, ovarian Ca s/p ORA, glaucoma Brought in by EMS for decreased mental status, decreased oral intake, dark urine, baseline AAOx2.  Patient was found hypothermic, hypotensive refractory to 3L started on pressor, zeenat, UTI, JOEY admitted to CCU for possible septic shock.      Problem list:  Severe sepsis with septic shock (POA)   Acute cystitis   Prerenal JOEY   Metabolic acidosis   Acute metabolic encephalopathy     Plan:  -shock state persistent but slowly improving, on low dose Levo, goal MAP>65, continue midodrine, hold antihypertensives   -s/p IVF resus, LA normal   -TTE with nl EF, no pericardial effusion   -daily reorientation   -renal fn improving, d/c IVF today   -hematuria today, will repeat CBC at 1200, may need to hold iv heparin if significant drop. Maintain Morgan   -po diet as tolerated   -DVT ppx with iv heparin   -poor prognosis overall, patient DNR, DNI     Patient critically ill on pressor     Sepsis criteria met? - NO 89 y/o F w/ PMH of PE, DVT, pA.fib on apixaban, HTN, dyslipidemia, colon cancer s/p partial colectomy w/ colostomy, ovarian Ca s/p ORA, glaucoma Brought in by EMS for decreased mental status, decreased oral intake, dark urine, baseline AAOx2.  Patient was found hypothermic, hypotensive refractory to 3L started on pressor, zeenat, UTI, JOEY admitted to CCU for possible septic shock.      Problem list:  Severe sepsis with septic shock (POA)   Acute cystitis   Prerenal JOEY   Metabolic acidosis   Acute metabolic encephalopathy     Plan:  -shock state persistent but slowly improving, on low dose Levo, goal MAP>65, continue midodrine, hold antihypertensives   -continue Zosyn, BCx neg, UCx shows contamination, previous cx reviewed   -s/p IVF resus, LA normal   -TTE with nl EF, no pericardial effusion   -daily reorientation   -renal fn improving, acidosis resolved, d/c IVF today   -hematuria today, will repeat CBC at 1200, may need to hold iv heparin if significant drop. Maintain Morgan   -po diet as tolerated   -DVT ppx with iv heparin   -poor prognosis overall, patient DNR, DNI     Patient critically ill on pressor     Sepsis criteria met? - NO 89 y/o F w/ PMH of PE, DVT, pA.fib on apixaban, HTN, dyslipidemia, colon cancer s/p partial colectomy w/ colostomy, ovarian Ca s/p ORA, glaucoma Brought in by EMS for decreased mental status, decreased oral intake, dark urine, baseline AAOx2.  Patient was found hypothermic, hypotensive refractory to 3L started on pressor, zeenat, UTI, JOEY admitted to CCU for possible septic shock.      Problem list:  Severe sepsis with septic shock (POA)   Acute cystitis   Prerenal JOEY   Metabolic acidosis   Acute metabolic encephalopathy     Plan:  -shock state persistent but slowly improving, on low dose Levo, goal MAP>65, continue midodrine, hold antihypertensives   -continue Zosyn, BCx neg, UCx shows contamination, previous cx reviewed   -s/p IVF resus, LA normal   -TTE with nl EF, no pericardial effusion   -daily reorientation   -renal fn improving, acidosis resolved, d/c IVF today   -hematuria today, will repeat CBC at 1200, may need to hold iv heparin if significant drop. Maintain Morgan   -po diet as tolerated   -DVT ppx with iv heparin   -poor prognosis overall, patient DNR, DNI     Patient critically ill on pressor     Family updated at bedside     Sepsis criteria met? - NO

## 2025-04-12 NOTE — CHART NOTE - NSCHARTNOTEFT_GEN_A_CORE
Repeat H/H with a drop, Levo requirement unchanged, urine clearing up  Will hold iv heparin today and reassess in am   Family updated

## 2025-04-12 NOTE — PROVIDER CONTACT NOTE (CRITICAL VALUE NOTIFICATION) - DATE AND TIME:
10-Apr-2025 11:54
10-Apr-2025 12:45
10-Apr-2025 18:08
12-Apr-2025 01:25
10-Apr-2025 15:23
10-Apr-2025 20:06

## 2025-04-12 NOTE — PROVIDER CONTACT NOTE (CRITICAL VALUE NOTIFICATION) - TEST AND RESULT REPORTED:
co2 - 10   RN - vandana goodwin
lactate 2.5
ABG- pH 7.18  HCO3- 9
aPTT >200
RN - vandana ruelas   VBG - ph: 7.03
carbon dioxide 9

## 2025-04-12 NOTE — PROVIDER CONTACT NOTE (CRITICAL VALUE NOTIFICATION) - PERSON GIVING RESULT:
Lab - timo herron
LAB
Kena Piedra
lab
[facility]

## 2025-04-13 LAB
ANION GAP SERPL CALC-SCNC: 6 MMOL/L — SIGNIFICANT CHANGE UP (ref 5–17)
BLD GP AB SCN SERPL QL: SIGNIFICANT CHANGE UP
BUN SERPL-MCNC: 57 MG/DL — HIGH (ref 7–23)
CALCIUM SERPL-MCNC: 9.5 MG/DL — SIGNIFICANT CHANGE UP (ref 8.5–10.1)
CHLORIDE SERPL-SCNC: 117 MMOL/L — HIGH (ref 96–108)
CO2 SERPL-SCNC: 23 MMOL/L — SIGNIFICANT CHANGE UP (ref 22–31)
CREAT SERPL-MCNC: 1.09 MG/DL — SIGNIFICANT CHANGE UP (ref 0.5–1.3)
EGFR: 46 ML/MIN/1.73M2 — LOW
EGFR: 46 ML/MIN/1.73M2 — LOW
FERRITIN SERPL-MCNC: 261 NG/ML — SIGNIFICANT CHANGE UP (ref 13–330)
GLUCOSE SERPL-MCNC: 96 MG/DL — SIGNIFICANT CHANGE UP (ref 70–99)
HCT VFR BLD CALC: 22.3 % — LOW (ref 34.5–45)
HCT VFR BLD CALC: 22.8 % — LOW (ref 34.5–45)
HGB BLD-MCNC: 7.6 G/DL — LOW (ref 11.5–15.5)
HGB BLD-MCNC: 7.7 G/DL — LOW (ref 11.5–15.5)
IRON SATN MFR SERPL: 60 % — HIGH (ref 14–50)
IRON SATN MFR SERPL: 87 UG/DL — SIGNIFICANT CHANGE UP (ref 30–160)
MAGNESIUM SERPL-MCNC: 2.1 MG/DL — SIGNIFICANT CHANGE UP (ref 1.6–2.6)
MCHC RBC-ENTMCNC: 32.2 PG — SIGNIFICANT CHANGE UP (ref 27–34)
MCHC RBC-ENTMCNC: 32.6 PG — SIGNIFICANT CHANGE UP (ref 27–34)
MCHC RBC-ENTMCNC: 33.3 G/DL — SIGNIFICANT CHANGE UP (ref 32–36)
MCHC RBC-ENTMCNC: 34.5 G/DL — SIGNIFICANT CHANGE UP (ref 32–36)
MCV RBC AUTO: 94.5 FL — SIGNIFICANT CHANGE UP (ref 80–100)
MCV RBC AUTO: 96.6 FL — SIGNIFICANT CHANGE UP (ref 80–100)
NRBC # BLD AUTO: 0.02 K/UL — HIGH (ref 0–0)
NRBC # BLD AUTO: 0.03 K/UL — HIGH (ref 0–0)
NRBC # FLD: 0.02 K/UL — HIGH (ref 0–0)
NRBC # FLD: 0.03 K/UL — HIGH (ref 0–0)
NRBC BLD AUTO-RTO: 0 /100 WBCS — SIGNIFICANT CHANGE UP (ref 0–0)
NRBC BLD AUTO-RTO: 0 /100 WBCS — SIGNIFICANT CHANGE UP (ref 0–0)
PHOSPHATE SERPL-MCNC: 1.6 MG/DL — LOW (ref 2.5–4.5)
PLATELET # BLD AUTO: 123 K/UL — LOW (ref 150–400)
PLATELET # BLD AUTO: 135 K/UL — LOW (ref 150–400)
PMV BLD: 10.1 FL — SIGNIFICANT CHANGE UP (ref 7–13)
PMV BLD: 10.2 FL — SIGNIFICANT CHANGE UP (ref 7–13)
POTASSIUM SERPL-MCNC: 3.2 MMOL/L — LOW (ref 3.5–5.3)
POTASSIUM SERPL-SCNC: 3.2 MMOL/L — LOW (ref 3.5–5.3)
RBC # BLD: 2.36 M/UL — LOW (ref 3.8–5.2)
RBC # BLD: 2.36 M/UL — LOW (ref 3.8–5.2)
RBC # FLD: 14.9 % — HIGH (ref 10.3–14.5)
RBC # FLD: 15 % — HIGH (ref 10.3–14.5)
SODIUM SERPL-SCNC: 146 MMOL/L — HIGH (ref 135–145)
TIBC SERPL-MCNC: 146 UG/DL — LOW (ref 220–430)
TRANSFERRIN SERPL-MCNC: 110 MG/DL — LOW (ref 200–360)
UIBC SERPL-MCNC: 59 UG/DL — LOW (ref 110–370)
WBC # BLD: 5.77 K/UL — SIGNIFICANT CHANGE UP (ref 3.8–10.5)
WBC # BLD: 7.54 K/UL — SIGNIFICANT CHANGE UP (ref 3.8–10.5)
WBC # FLD AUTO: 5.77 K/UL — SIGNIFICANT CHANGE UP (ref 3.8–10.5)
WBC # FLD AUTO: 7.54 K/UL — SIGNIFICANT CHANGE UP (ref 3.8–10.5)

## 2025-04-13 PROCEDURE — 99233 SBSQ HOSP IP/OBS HIGH 50: CPT

## 2025-04-13 RX ORDER — POTASSIUM PHOSPHATE, MONOBASIC POTASSIUM PHOSPHATE, DIBASIC INJECTION, 236; 224 MG/ML; MG/ML
30 SOLUTION, CONCENTRATE INTRAVENOUS ONCE
Refills: 0 | Status: COMPLETED | OUTPATIENT
Start: 2025-04-13 | End: 2025-04-13

## 2025-04-13 RX ADMIN — Medication 40 MILLIEQUIVALENT(S): at 09:59

## 2025-04-13 RX ADMIN — Medication 1 APPLICATION(S): at 09:58

## 2025-04-13 RX ADMIN — POTASSIUM PHOSPHATE, MONOBASIC POTASSIUM PHOSPHATE, DIBASIC INJECTION, 83.33 MILLIMOLE(S): 236; 224 SOLUTION, CONCENTRATE INTRAVENOUS at 09:57

## 2025-04-13 RX ADMIN — DIPHENHYDRAMINE HYDROCHLORIDE AND LIDOCAINE HYDROCHLORIDE AND ALUMINUM HYDROXIDE AND MAGNESIUM HYDRO 5 MILLILITER(S): KIT at 06:15

## 2025-04-13 RX ADMIN — Medication 40 MILLIGRAM(S): at 09:58

## 2025-04-13 RX ADMIN — Medication 25 GRAM(S): at 01:02

## 2025-04-13 RX ADMIN — MIDODRINE HYDROCHLORIDE 10 MILLIGRAM(S): 5 TABLET ORAL at 06:42

## 2025-04-13 RX ADMIN — MIDODRINE HYDROCHLORIDE 10 MILLIGRAM(S): 5 TABLET ORAL at 22:03

## 2025-04-13 RX ADMIN — Medication 25 GRAM(S): at 13:10

## 2025-04-13 RX ADMIN — MIDODRINE HYDROCHLORIDE 10 MILLIGRAM(S): 5 TABLET ORAL at 13:11

## 2025-04-13 NOTE — PROGRESS NOTE ADULT - ASSESSMENT
89 y/o F w/ PMH of PE, DVT, pA.fib on apixaban, HTN, dyslipidemia, colon cancer s/p partial colectomy w/ colostomy, ovarian Ca s/p ORA, glaucoma Brought in by EMS for decreased mental status, decreased oral intake, dark urine, baseline AAOx2.  Patient was found hypothermic, hypotensive refractory to 3L started on pressor, zeenat, UTI, JOEY admitted to CCU for possible septic shock.      Problem list:  Severe sepsis with septic shock (POA)   Acute cystitis   Prerenal JOEY   Metabolic acidosis   Acute metabolic encephalopathy   Worsening anemia    Plan:  -off Levo, continue midodrine, hold antihypertensives   -continue Zosyn, BCx neg, UCx shows contamination, previous cx reviewed, will do a 5-7 day course of Zosyn   -s/p IVF resus, LA normal   -TTE with nl EF, no pericardial effusion   -daily reorientation, mentation improving   -renal fn improving, acidosis resolved, d/c Morgan    -hematuria resolved but H/H continues to drop, no active bleeding, recent CT without hemorrhage, abdominal exam benign - will repeat CBC in pm today, will transfuse PRBC if Hb<7. Hold iv heparin    -po diet as tolerated   -DVT ppx with SCDs  -poor prognosis overall, patient DNR, DNI     Family updated at bedside     Sepsis criteria met? - NO

## 2025-04-14 ENCOUNTER — APPOINTMENT (OUTPATIENT)
Dept: HOME HEALTH SERVICES | Facility: HOME HEALTH | Age: 89
End: 2025-04-14

## 2025-04-14 LAB
ANION GAP SERPL CALC-SCNC: 6 MMOL/L — SIGNIFICANT CHANGE UP (ref 5–17)
BUN SERPL-MCNC: 42 MG/DL — HIGH (ref 7–23)
CALCIUM SERPL-MCNC: 9.1 MG/DL — SIGNIFICANT CHANGE UP (ref 8.5–10.1)
CHLORIDE SERPL-SCNC: 122 MMOL/L — HIGH (ref 96–108)
CO2 SERPL-SCNC: 21 MMOL/L — LOW (ref 22–31)
CREAT SERPL-MCNC: 1.12 MG/DL — SIGNIFICANT CHANGE UP (ref 0.5–1.3)
EGFR: 45 ML/MIN/1.73M2 — LOW
EGFR: 45 ML/MIN/1.73M2 — LOW
GLUCOSE SERPL-MCNC: 107 MG/DL — HIGH (ref 70–99)
HCT VFR BLD CALC: 25.1 % — LOW (ref 34.5–45)
HGB BLD-MCNC: 8.4 G/DL — LOW (ref 11.5–15.5)
MAGNESIUM SERPL-MCNC: 1.9 MG/DL — SIGNIFICANT CHANGE UP (ref 1.6–2.6)
MCHC RBC-ENTMCNC: 32.6 PG — SIGNIFICANT CHANGE UP (ref 27–34)
MCHC RBC-ENTMCNC: 33.5 G/DL — SIGNIFICANT CHANGE UP (ref 32–36)
MCV RBC AUTO: 97.3 FL — SIGNIFICANT CHANGE UP (ref 80–100)
NRBC # BLD AUTO: 0.05 K/UL — HIGH (ref 0–0)
NRBC # FLD: 0.05 K/UL — HIGH (ref 0–0)
NRBC BLD AUTO-RTO: 0 /100 WBCS — SIGNIFICANT CHANGE UP (ref 0–0)
PHOSPHATE SERPL-MCNC: 3 MG/DL — SIGNIFICANT CHANGE UP (ref 2.5–4.5)
PLATELET # BLD AUTO: 124 K/UL — LOW (ref 150–400)
PMV BLD: 10.2 FL — SIGNIFICANT CHANGE UP (ref 7–13)
POTASSIUM SERPL-MCNC: 4 MMOL/L — SIGNIFICANT CHANGE UP (ref 3.5–5.3)
POTASSIUM SERPL-SCNC: 4 MMOL/L — SIGNIFICANT CHANGE UP (ref 3.5–5.3)
RBC # BLD: 2.58 M/UL — LOW (ref 3.8–5.2)
RBC # FLD: 15.8 % — HIGH (ref 10.3–14.5)
SODIUM SERPL-SCNC: 149 MMOL/L — HIGH (ref 135–145)
WBC # BLD: 7.74 K/UL — SIGNIFICANT CHANGE UP (ref 3.8–10.5)
WBC # FLD AUTO: 7.74 K/UL — SIGNIFICANT CHANGE UP (ref 3.8–10.5)

## 2025-04-14 PROCEDURE — 99497 ADVNCD CARE PLAN 30 MIN: CPT

## 2025-04-14 PROCEDURE — 99232 SBSQ HOSP IP/OBS MODERATE 35: CPT

## 2025-04-14 PROCEDURE — 99233 SBSQ HOSP IP/OBS HIGH 50: CPT

## 2025-04-14 RX ORDER — APIXABAN 2.5 MG/1
2.5 TABLET, FILM COATED ORAL
Refills: 0 | Status: DISCONTINUED | OUTPATIENT
Start: 2025-04-14 | End: 2025-04-16

## 2025-04-14 RX ORDER — ACETAMINOPHEN 500 MG/5ML
650 LIQUID (ML) ORAL EVERY 6 HOURS
Refills: 0 | Status: DISCONTINUED | OUTPATIENT
Start: 2025-04-14 | End: 2025-04-16

## 2025-04-14 RX ORDER — ATORVASTATIN CALCIUM 80 MG/1
20 TABLET, FILM COATED ORAL AT BEDTIME
Refills: 0 | Status: DISCONTINUED | OUTPATIENT
Start: 2025-04-14 | End: 2025-04-16

## 2025-04-14 RX ADMIN — Medication 25 GRAM(S): at 11:03

## 2025-04-14 RX ADMIN — ATORVASTATIN CALCIUM 20 MILLIGRAM(S): 80 TABLET, FILM COATED ORAL at 21:32

## 2025-04-14 RX ADMIN — Medication 25 GRAM(S): at 23:28

## 2025-04-14 RX ADMIN — MIDODRINE HYDROCHLORIDE 10 MILLIGRAM(S): 5 TABLET ORAL at 21:32

## 2025-04-14 RX ADMIN — MIDODRINE HYDROCHLORIDE 10 MILLIGRAM(S): 5 TABLET ORAL at 05:29

## 2025-04-14 RX ADMIN — Medication 40 MILLIGRAM(S): at 09:27

## 2025-04-14 RX ADMIN — APIXABAN 2.5 MILLIGRAM(S): 2.5 TABLET, FILM COATED ORAL at 17:36

## 2025-04-14 RX ADMIN — Medication 650 MILLIGRAM(S): at 16:36

## 2025-04-14 RX ADMIN — MIDODRINE HYDROCHLORIDE 10 MILLIGRAM(S): 5 TABLET ORAL at 14:07

## 2025-04-14 RX ADMIN — Medication 25 GRAM(S): at 00:06

## 2025-04-14 RX ADMIN — Medication 1 APPLICATION(S): at 05:29

## 2025-04-14 NOTE — PROGRESS NOTE ADULT - ASSESSMENT
89 y/o F w/ PMH of PE, DVT, pA.fib on apixaban, HTN, dyslipidemia, colon cancer s/p partial colectomy w/ colostomy, ovarian Ca s/p ORA, glaucoma Brought in by EMS for decreased mental status, decreased oral intake, dark urine, baseline AAOx2.  Patient was found hypothermic, hypotensive refractory to 3L started on pressor, zeenat, UTI, JOEY admitted to CCU for possible septic shock.      Problem list:  Severe sepsis with septic shock (POA)   Acute cystitis   Prerenal JOEY   Metabolic acidosis   Acute metabolic encephalopathy   Worsening anemia    Plan:  -off Levo, continue midodrine, hold antihypertensives (was on metoprolol at home, probably can restart in day or 2  -continue Zosyn, BCx neg, UCx shows 3 organismd ? continant  Day 3/7 of Zosyn   -TTE with nl EF, no pericardial effusion   -daily reorientation, mentation improving   -renal fn improving, acidosis resolved, d/c Morgan    -hematuria resolved no active bleeding, recent CT without hemorrhage, abdominal exam benign - restart eliquis  -po diet as tolerated   -DVT ppx with SCDs   patient DNR, DNI

## 2025-04-14 NOTE — PROGRESS NOTE ADULT - ASSESSMENT
Pt is a 89y old Female with hx of PE, DVT, HTN, dyslipidemia, colonc ancer s/p partial colectomy w/ colostomy, ovarian Ca s/p ORA, glaucoma Brought in by EMS for decreased mental status, decreased oral intake, dark urine progressively worsening over the last few days according to family. She is usually A+Ox2 per family. In the ER found to hypothermic, hypotensive, bradycardic. Found to have +UA, give 3L IVF and started on vasopressors.     1. Urosepsis  -off vasopressors  -on midodrine  -IV abx  -creatinine normalized  -MOLST on chart with DNR/DNI trial NIV    2. Severe protein calorie malnutrition  -appreciate dietary input  -minced and moist diet, poor PO intake      Process of Care    --Reviewed dx/treatment problems and alignment with Goals of Care      Physical Aspects of Care    --Pain    patient denies at this time    c/w current management      --Bowel Regimen    risk for constipation d/t immobility    daily dulcolax as needed      --Dyspnea    No SOB at this time    on room air  comfortable and in NAD      --Nausea Vomiting    denies      --Weakness    PT as tolerated           Psychological and Psychiatric Aspects of Care:     --Grief/ Bereavement: emotional support provided    --Hx of psychiatric dx: none    --Pastoral Care Available PRN           Social Aspects of Care    --SW involved    Cultural Aspects    --Primary Language: English            Goals of Care:   see GOC documented above          We discussed Palliative Care team being a supportive team when a patient has ongoing illnesses.  We also discussed that it is not an end of life care service, but can help navigate symptoms and emotional support throughout their hospital stay here.            Ethical and Legal Aspects: NA     Capacity-  A&Ox2 does not appear to have medical decision making capacity at time of my assessment    HCP/Surrogate:   Barak Puga, daughter [phone]          Code Status:  DNR/DNI trial NIV    MOLST:  MOLST from 4/10/25 with limitations of DNR/DNI trial NIV    Dispo Plan:  referral to home hospice               Discussed With: Dr. Hart coordinated with attending and SW and RN            Time Spent: 40 minutes including the care, coordination and counseling of this patient, 50% of which was spent coordinating and counseling.

## 2025-04-14 NOTE — PROGRESS NOTE ADULT - ASSESSMENT
A/P:    Severe sepsis with septic shock (POA)   Acute cystitis   Prerenal JOEY   Metabolic acidosis   Acute metabolic encephalopathy   Worsening anemia    Plan:  -off Levo, continue midodrine, hold antihypertensives (was on metoprolol at home, probably can restart in day or 2  -continue Zosyn, BCx neg, UCx shows 3 organismd ? continant  Day 3/7 of Zosyn   -TTE with nl EF, no pericardial effusion   -daily reorientation, mentation improving   -renal fn improving, acidosis resolved, d/c Morgan    -hematuria resolved no active bleeding, recent CT without hemorrhage, abdominal exam benign - restart eliquis  -po diet as tolerated   -DVT ppx with SCDs    GOC: DNR/DNI with Trial NIV

## 2025-04-14 NOTE — GOALS OF CARE CONVERSATION - ADVANCED CARE PLANNING - TREATMENT GUIDELINE COMMENT
Case discussed via phone as HCP not at bedside
Case discussed via phone as HCP/Daughter not at bedside

## 2025-04-14 NOTE — GOALS OF CARE CONVERSATION - ADVANCED CARE PLANNING - CONVERSATION DETAILS
Team spoke with pts daughter/HCP Barak to follow up and provide support. Pt. is now off pressors and has improved slightly. We discussed the overall Goals for pt. Pts daughter shared she does not think pt. would benefit from [facility] as she sees pt. overall has been declining. She would like to bring pt. back to her house and will be pts full time caregiver. We discussed option of focusing on her comfort at home with Hospice services. Hospice services and philosophy of care as well as the difference between home and inpatient discussed in detail. Pts daughter would like pt. to return home on Hospice once medically d/c and requests a referral to [facility]. She wishes to continue with current medical management here in the hospital and start comfort focus once pt. is home on Hospice. Floor SUSAN Liao made aware and will place referral. Plan for home hospice [facility]. Emotional support provided. Our team will continue to follow.

## 2025-04-15 DIAGNOSIS — E43 UNSPECIFIED SEVERE PROTEIN-CALORIE MALNUTRITION: ICD-10-CM

## 2025-04-15 LAB
ANION GAP SERPL CALC-SCNC: 6 MMOL/L — SIGNIFICANT CHANGE UP (ref 5–17)
BUN SERPL-MCNC: 36 MG/DL — HIGH (ref 7–23)
CALCIUM SERPL-MCNC: 9.4 MG/DL — SIGNIFICANT CHANGE UP (ref 8.5–10.1)
CHLORIDE SERPL-SCNC: 122 MMOL/L — HIGH (ref 96–108)
CO2 SERPL-SCNC: 21 MMOL/L — LOW (ref 22–31)
CREAT SERPL-MCNC: 1.12 MG/DL — SIGNIFICANT CHANGE UP (ref 0.5–1.3)
CULTURE RESULTS: SIGNIFICANT CHANGE UP
CULTURE RESULTS: SIGNIFICANT CHANGE UP
EGFR: 45 ML/MIN/1.73M2 — LOW
EGFR: 45 ML/MIN/1.73M2 — LOW
GLUCOSE SERPL-MCNC: 103 MG/DL — HIGH (ref 70–99)
HCT VFR BLD CALC: 25.9 % — LOW (ref 34.5–45)
HGB BLD-MCNC: 8.3 G/DL — LOW (ref 11.5–15.5)
MCHC RBC-ENTMCNC: 31.8 PG — SIGNIFICANT CHANGE UP (ref 27–34)
MCHC RBC-ENTMCNC: 32 G/DL — SIGNIFICANT CHANGE UP (ref 32–36)
MCV RBC AUTO: 99.2 FL — SIGNIFICANT CHANGE UP (ref 80–100)
NRBC # BLD AUTO: 0.03 K/UL — HIGH (ref 0–0)
NRBC # FLD: 0.03 K/UL — HIGH (ref 0–0)
NRBC BLD AUTO-RTO: 0 /100 WBCS — SIGNIFICANT CHANGE UP (ref 0–0)
PLATELET # BLD AUTO: 113 K/UL — LOW (ref 150–400)
PMV BLD: 10.5 FL — SIGNIFICANT CHANGE UP (ref 7–13)
POTASSIUM SERPL-MCNC: 3.9 MMOL/L — SIGNIFICANT CHANGE UP (ref 3.5–5.3)
POTASSIUM SERPL-SCNC: 3.9 MMOL/L — SIGNIFICANT CHANGE UP (ref 3.5–5.3)
RBC # BLD: 2.61 M/UL — LOW (ref 3.8–5.2)
RBC # FLD: 16.5 % — HIGH (ref 10.3–14.5)
SODIUM SERPL-SCNC: 149 MMOL/L — HIGH (ref 135–145)
SPECIMEN SOURCE: SIGNIFICANT CHANGE UP
SPECIMEN SOURCE: SIGNIFICANT CHANGE UP
WBC # BLD: 7.16 K/UL — SIGNIFICANT CHANGE UP (ref 3.8–10.5)
WBC # FLD AUTO: 7.16 K/UL — SIGNIFICANT CHANGE UP (ref 3.8–10.5)

## 2025-04-15 PROCEDURE — 99233 SBSQ HOSP IP/OBS HIGH 50: CPT

## 2025-04-15 PROCEDURE — 99232 SBSQ HOSP IP/OBS MODERATE 35: CPT

## 2025-04-15 RX ORDER — SODIUM CHLORIDE 9 G/1000ML
1000 INJECTION, SOLUTION INTRAVENOUS
Refills: 0 | Status: DISCONTINUED | OUTPATIENT
Start: 2025-04-15 | End: 2025-04-16

## 2025-04-15 RX ADMIN — MIDODRINE HYDROCHLORIDE 10 MILLIGRAM(S): 5 TABLET ORAL at 05:53

## 2025-04-15 RX ADMIN — Medication 1 APPLICATION(S): at 05:52

## 2025-04-15 RX ADMIN — SODIUM CHLORIDE 75 MILLILITER(S): 9 INJECTION, SOLUTION INTRAVENOUS at 14:08

## 2025-04-15 RX ADMIN — Medication 25 GRAM(S): at 14:09

## 2025-04-15 RX ADMIN — Medication 2.5 MILLIGRAM(S): at 15:45

## 2025-04-15 RX ADMIN — Medication 2.5 MILLIGRAM(S): at 15:52

## 2025-04-15 RX ADMIN — Medication 40 MILLIGRAM(S): at 10:30

## 2025-04-15 RX ADMIN — APIXABAN 2.5 MILLIGRAM(S): 2.5 TABLET, FILM COATED ORAL at 05:53

## 2025-04-15 RX ADMIN — ATORVASTATIN CALCIUM 20 MILLIGRAM(S): 80 TABLET, FILM COATED ORAL at 21:55

## 2025-04-15 RX ADMIN — MIDODRINE HYDROCHLORIDE 10 MILLIGRAM(S): 5 TABLET ORAL at 21:55

## 2025-04-15 NOTE — PHYSICAL THERAPY INITIAL EVALUATION ADULT - PERTINENT HX OF CURRENT PROBLEM, REHAB EVAL
89 y/o F w/ PMH of PE, DVT, HTN, dyslipidemia, colonc ancer s/p partial colectomy w/ colostomy, ovarian Ca s/p ORA, glaucoma Brought in by EMS for decreased mental status, decreased oral intake, dark urine progressively worsening over the last few days according to family. She is usually A+Ox2 per family. In the ER found to hypothermic, hypotensive, bradycardic. Found to have +UA, give 3L IVF and started on vasopressors. Severe sepsis with septic shock

## 2025-04-15 NOTE — PHYSICAL THERAPY INITIAL EVALUATION ADULT - PASSIVE RANGE OF MOTION EXAMINATION, REHAB EVAL
no Passive ROM deficits were identified/bilateral lower extremity Passive ROM was WNL/Right UE Passive ROM was WFL (within functional limits)

## 2025-04-15 NOTE — PROGRESS NOTE ADULT - ASSESSMENT
A/P:    Severe sepsis with septic shock (POA)   Acute cystitis   Prerenal JOEY   Metabolic acidosis   Acute metabolic encephalopathy   Worsening anemia    Plan:  -family has decided to make patient hospice care at home, dc plan in progress with home hospice care   -off Levo, continue midodrine, hold antihypertensives (was on metoprolol at home, probably can restart in day or 2  -completed Zosyn, BCx neg,   -TTE with nl EF, no pericardial effusion   -daily reorientation, mentation improving   -renal fn improving, acidosis resolved, d/c Morgan    -hematuria resolved no active bleeding, recent CT without hemorrhage, abdominal exam benign - restart eliquis  -po diet as tolerated   -DVT ppx with SCDs    GOC: DNR/DNI with Trial NIV  Spoke with daughter on the phone, no more blood drawn and no more Abx.

## 2025-04-15 NOTE — PROGRESS NOTE ADULT - REASON FOR ADMISSION
Urosepsis, septic shock

## 2025-04-15 NOTE — CHART NOTE - NSCHARTNOTEFT_GEN_A_CORE
Clinical Nutrition Follow Up Note:    *89 y/o F with a PMHx of PE, DVT, HTN, dyslipidemia, colon cancer s/p partial colectomy w/ colostomy, ovarian Ca s/p ORA, glaucoma Brought in by EMS for decreased mental status, decreased oral intake, dark urine progressively worsening over the last few days according to family. She is usually A+Ox2 per family. In the ER found to hypothermic, hypotensive, bradycardic. Found to have +UA, give 3L IVF and started on vasopressors. Admitted for septic shock, ARF, metabolic acidosis, urosepsis, and protein caloric malnutrition; admitted to CCU. Pt is DNR/DNI, however TF IS NOT ADDRESSED IN MOLST.    *current status: SLP Azalia (4/11): Minced & Moist w/ thin liquids. Palliative care following, plan for home hospice. Downgraded from CCU to med-surg. Pt lethargic appearing this AM. With very poor PO intake since admission, noted to be refusing most meals. Nutrition interventions not appropriate at this time as plan for pt to be dc'd to home hospice at [facility]. C/w minced & moist & ensure plus high protein TID. RD will no longer actively follow. Please reconsult RD PRN.    *Labs Reviewed:  04-15    149[H]  |  122[H]  |  36[H]  ----------------------------<  103[H]  3.9   |  21[L]  |  1.12    Ca    9.4      15 Apr 2025 05:42  Phos  3.0     04-14  Mg     1.9     04-14    BMI: BMI (kg/m2): 23.6 (04-10-25 @ 13:04)  HbA1c:   Glucose: POCT Blood Glucose.: 140 mg/dL (04-10-25 @ 11:06)    BP: 117/39 (04-15-25 @ 08:00) (80/69 - 125/52)Vital Signs Last 24 Hrs  T(C): 36.4 (04-15-25 @ 09:00), Max: 36.7 (04-14-25 @ 19:00)  T(F): 97.5 (04-15-25 @ 09:00), Max: 98.1 (04-14-25 @ 19:00)  HR: 66 (04-15-25 @ 09:00) (61 - 86)  BP: 117/39 (04-15-25 @ 08:00) (103/43 - 124/49)  BP(mean): 62 (04-15-25 @ 08:00) (62 - 87)  RR: 17 (04-15-25 @ 09:00) (17 - 29)  SpO2: 95% (04-15-25 @ 09:00) (94% - 100%)    *pertinent meds: midodrine, protonix, abx, acetaminophen     *I and O's:    04-14-25 @ 07:01  -  04-15-25 @ 07:00  --------------------------------------------------------  IN:  Total IN: 0 mL    OUT:    Post-Void Residual per Intermittent Catheterization (mL): 400 mL  Total OUT: 400 mL    Total NET: -400 mL    *(+) BM on 4/14 - moderate, soft, dark brown, colostomy; pt not on bowel regimen.    *suri score of 12 : PIs documented - L/R Heel (stage 1), L/R buttocks (stage 2), Coccyx (stage 2), L 1st Toe (DTI). 1+ generalized edema documented.    *TF intake, meeting ~ <25% of estimated nutr needs.    *Malnutrition dx: Pt continues to meet criteria for severe malnutrition in context of acute on chronic disease r/t decreased ability to meet increased nutrient needs 2/2 advanced age, wound healing, poor appetite, septic shock AEB moderate to severe muscle/ fat wasting, 6.7% wt loss x 1 mon    Diet, Minced and Moist:   Supplement Feeding Modality:  Oral  Ensure Plus High Protein Cans or Servings Per Day:  2       Frequency:  Two Times a day (04-13-25 @ 13:16) [Active]    Estimated Needs: Based on 63.5Kg   Calories: 7949-2061 Kcal (30-35 Kcal/Kg)  Protein:  g (1.4-1.6 g/Kg)  Fluids: 8245-7662 mL (25-30 mL/Kg)    *Wt Hx:    Height (cm): 165.1 (04-10-25 @ 11:06)  Weight (kg): 64.2 (04-10-25 @ 13:04)  BMI (kg/m2): 23.6 (04-10-25 @ 13:04)  BSA (m2): 1.71 (04-10-25 @ 13:04)    Recommendations:  1) C/w Minced & Moist w/ Ensure Plus High Protein TID   2) Nutrition interventions not appropriate as plan for home hospice. Will sign off. Please reconsult RD HAIN    Lizbeth Muller, MS, RDN, CDN, [facility] [phone]  Certified Nutrition

## 2025-04-15 NOTE — PHYSICAL THERAPY INITIAL EVALUATION ADULT - ADDITIONAL COMMENTS
Pt. will be d/c to her dtr.'s home for [facility] services. Pt's dtr has 3STE from front, 2STE from back porch doors, pt will be on main level.

## 2025-04-15 NOTE — PROGRESS NOTE ADULT - ASSESSMENT
Pt is a 89y old Female with hx of PE, DVT, HTN, dyslipidemia, colonc ancer s/p partial colectomy w/ colostomy, ovarian Ca s/p ORA, glaucoma Brought in by EMS for decreased mental status, decreased oral intake, dark urine progressively worsening over the last few days according to family. She is usually A+Ox2 per family. In the ER found to hypothermic, hypotensive, bradycardic. Found to have +UA, give 3L IVF and started on vasopressors.     1. Urosepsis  -off vasopressors  -on midodrine  -IV abx  -creatinine normalized  -MOLST on chart with DNR/DNI trial NIV  -referral to home hospice    2. Severe protein calorie malnutrition  -appreciate dietary input  -minced and moist diet, poor PO intake      Process of Care    --Reviewed dx/treatment problems and alignment with Goals of Care      Physical Aspects of Care    --Pain    patient denies at this time    c/w current management      --Bowel Regimen    risk for constipation d/t immobility    daily dulcolax as needed      --Dyspnea    No SOB at this time    on room air  comfortable and in NAD      --Nausea Vomiting    denies      --Weakness    PT as tolerated           Psychological and Psychiatric Aspects of Care:     --Grief/ Bereavement: emotional support provided    --Hx of psychiatric dx: none    --Pastoral Care Available PRN           Social Aspects of Care    --SW involved    Cultural Aspects    --Primary Language: English            Goals of Care:   see GOC documented above          We discussed Palliative Care team being a supportive team when a patient has ongoing illnesses.  We also discussed that it is not an end of life care service, but can help navigate symptoms and emotional support throughout their hospital stay here.            Ethical and Legal Aspects: NA     Capacity-  A&Ox2 does not appear to have medical decision making capacity at time of my assessment    HCP/Surrogate:   Barak Puga, daughter [phone]          Code Status:  DNR/DNI trial NIV    MOLST:  MOLST from 4/10/25 with limitations of DNR/DNI trial NIV    Dispo Plan:  d/c to daughter's house with home hospice               Discussed With: Dr. Hart coordinated with attending and SW and RN            Time Spent: 20 minutes including the care, coordination and counseling of this patient, 50% of which was spent coordinating and counseling.

## 2025-04-15 NOTE — PHYSICAL THERAPY INITIAL EVALUATION ADULT - GENERAL OBSERVATIONS, REHAB EVAL
Plan is for home hospice with [facility]. Pt. received semi supine in bed CCU. Drowsy this AM arouses to voice. Asked for head of bed to get down. PROM checked all ext. Pt. with no c/o discomfort. Plan is for home hospice with [facility].

## 2025-04-15 NOTE — PROGRESS NOTE ADULT - SUBJECTIVE AND OBJECTIVE BOX
Patient is a 89y old  Female who presents with a chief complaint of Urosepsis, septic shock (11 Apr 2025 15:49)    Allergies    No Known Drug Allergies  Originally Entered as [Rash] reaction to [adhesive tape] (Unknown)    Intolerances      REVIEW OF SYSTEMS: SEE BELOW       ICU Vital Signs Last 24 Hrs  T(C): 36.1 (12 Apr 2025 05:00), Max: 36.4 (11 Apr 2025 16:00)  T(F): 97 (12 Apr 2025 05:00), Max: 97.5 (11 Apr 2025 16:00)  HR: 89 (12 Apr 2025 09:00) (67 - 126)  BP: 88/45 (12 Apr 2025 09:00) (87/62 - 148/123)  BP(mean): 59 (12 Apr 2025 09:00) (51 - 132)  ABP: --  ABP(mean): --  RR: 19 (12 Apr 2025 09:00) (16 - 23)  SpO2: 98% (12 Apr 2025 09:00) (94% - 100%)    O2 Parameters below as of 12 Apr 2025 07:00  Patient On (Oxygen Delivery Method): room air            CAPILLARY BLOOD GLUCOSE          I&O's Summary    11 Apr 2025 07:01  -  12 Apr 2025 07:00  --------------------------------------------------------  IN: 1383 mL / OUT: 1100 mL / NET: 283 mL            MEDICATIONS  (STANDING):  albumin human 25% IVPB 100 milliLiter(s) IV Intermittent every 8 hours  chlorhexidine 2% Cloths 1 Application(s) Topical <User Schedule>  heparin  Infusion.  Unit(s)/Hr (11 mL/Hr) IV Continuous <Continuous>  midodrine 10 milliGRAM(s) Oral every 8 hours  norepinephrine Infusion 0.05 MICROgram(s)/kG/Min (6.02 mL/Hr) IV Continuous <Continuous>  pantoprazole  Injectable 40 milliGRAM(s) IV Push daily  piperacillin/tazobactam IVPB.. 3.375 Gram(s) IV Intermittent every 12 hours  potassium chloride   Powder 20 milliEquivalent(s) Oral once  sodium chloride 0.45%. 1000 milliLiter(s) (75 mL/Hr) IV Continuous <Continuous>      MEDICATIONS  (PRN):  heparin   Injectable 5000 Unit(s) IV Push every 6 hours PRN For aPTT less than 40  heparin   Injectable 2500 Unit(s) IV Push every 6 hours PRN For aPTT between 40 - 57      PHYSICAL EXAM: SEE BELOW                          9.9    11.09 )-----------( 163      ( 12 Apr 2025 05:17 )             28.5       04-12    143  |  115[H]  |  61[H]  ----------------------------<  117[H]  3.7   |  20[L]  |  1.24    Ca    8.3[L]      12 Apr 2025 05:17  Phos  4.0     04-12  Mg     1.5     04-12    TPro  4.6[L]  /  Alb  1.8[L]  /  TBili  0.3  /  DBili  x   /  AST  23  /  ALT  28  /  AlkPhos  79  04-12    Lactate 1.5           04-12 @ 05:17          PT/INR - ( 10 Apr 2025 18:48 )   PT: 21.8 sec;   INR: 1.91 ratio         PTT - ( 12 Apr 2025 08:24 )  PTT:134.1 sec  Urinalysis Basic - ( 12 Apr 2025 05:17 )    Color: x / Appearance: x / SG: x / pH: x  Gluc: 117 mg/dL / Ketone: x  / Bili: x / Urobili: x   Blood: x / Protein: x / Nitrite: x   Leuk Esterase: x / RBC: x / WBC x   Sq Epi: x / Non Sq Epi: x / Bacteria: x      Blood Blood-Peripheral   No growth at 24 hours -- 04-10 @ 11:10          
Patient is seen and examined, Chart is reviewed.     Gen: No fever, chills, weakness  ENT: No visual changes or throat pain  Neck: No pain or stiffness  Respiratory: No cough or wheezing  Cardiovascular: No chest pain or palpitations  Gastrointestinal: No abdominal pain, nausea, vomiting, constipation, or diarrhea  Hematologic: No easy bleeding or bruising  Neurologic: No numbness or focal weakness  Psych: No depression or insomnia  Skin: No rash or itching      MEDICATIONS  (STANDING):  apixaban 2.5 milliGRAM(s) Oral two times a day  atorvastatin 20 milliGRAM(s) Oral at bedtime  chlorhexidine 2% Cloths 1 Application(s) Topical <User Schedule>  dextrose 5%. 1000 milliLiter(s) (75 mL/Hr) IV Continuous <Continuous>  midodrine 10 milliGRAM(s) Oral every 8 hours  pantoprazole  Injectable 40 milliGRAM(s) IV Push daily    MEDICATIONS  (PRN):  acetaminophen     Tablet .. 650 milliGRAM(s) Oral every 6 hours PRN Mild Pain (1 - 3)  FIRST- Mouthwash  BLM 5 milliLiter(s) Swish and Spit every 6 hours PRN Mouth Care  morphine Concentrate 2.5 milliGRAM(s) Oral every 4 hours PRN moderate-severe pain or dyspnea      Vital Signs Last 24 Hrs  T(C): 36.7 (15 Apr 2025 13:00), Max: 36.7 (14 Apr 2025 19:00)  T(F): 98.1 (15 Apr 2025 13:00), Max: 98.1 (14 Apr 2025 19:00)  HR: 67 (15 Apr 2025 13:00) (61 - 78)  BP: 121/50 (15 Apr 2025 12:00) (103/43 - 124/49)  BP(mean): 67 (15 Apr 2025 12:00) (62 - 87)  RR: 18 (15 Apr 2025 13:00) (17 - 27)  SpO2: 93% (15 Apr 2025 13:00) (93% - 100%)    Parameters below as of 14 Apr 2025 18:00  Patient On (Oxygen Delivery Method): room air      General - no distress, frail  HEENT - nc/at  neck supple  lungs clear  cv rrr  abdomen soft, s/p colostomy   voiding  extremity warm  neuro non  focal weak        LABS:                          8.3    7.16  )-----------( 113      ( 15 Apr 2025 05:42 )             25.9     15 Apr 2025 05:42    149    |  122    |  36     ----------------------------<  103    3.9     |  21     |  1.12     Ca    9.4        15 Apr 2025 05:42  Phos  3.0       14 Apr 2025 05:31  Mg     1.9       14 Apr 2025 05:31          CAPILLARY BLOOD GLUCOSE            Urinalysis Basic - ( 15 Apr 2025 05:42 )    Color: x / Appearance: x / SG: x / pH: x  Gluc: 103 mg/dL / Ketone: x  / Bili: x / Urobili: x   Blood: x / Protein: x / Nitrite: x   Leuk Esterase: x / RBC: x / WBC x   Sq Epi: x / Non Sq Epi: x / Bacteria: x        RADIOLOGY:        
HPI: Pt is a 89y old Female with hx of PE, DVT, HTN, dyslipidemia, colonc ancer s/p partial colectomy w/ colostomy, ovarian Ca s/p ORA, glaucoma Brought in by EMS for decreased mental status, decreased oral intake, dark urine progressively worsening over the last few days according to family. She is usually A+Ox2 per family. In the ER found to hypothermic, hypotensive, bradycardic. Found to have +UA, give 3L IVF and started on vasopressors.     Unable to obtain history from patient due to clinical condition.       Palliative consulted for GOC.    4/11: Seen and examined at bedside. Alert, oriented x2. Denies pain, sob. On room air in NAD. Remains on Levophed, per RN being titrated down. Son, Bhupinder at bedside., Will reach out to daughter, Barak, as she is alternate HCP.  4/14: Alert this AM, A&Ox2. Off pressors. Minimal PO intake per son at bedside. Spoke with daughter - see GOC below.  4/15: Drowsy this AM arouses to voice. Denies pain or any discomfort. Asking for some water. Plan to d/c to daughter's house with support of hospice.      CODE STATUS: DNR/DNI trial NIV      Pain and Dyspnea:     denies pain  denies dyspnea, on room air in NAD      Review of Systems:    unable to obtain: poor historian        PHYSICAL EXAM:    Vital Signs Last 24 Hrs  T(C): 36.4 (15 Apr 2025 09:00), Max: 36.7 (14 Apr 2025 19:00)  T(F): 97.5 (15 Apr 2025 09:00), Max: 98.1 (14 Apr 2025 19:00)  HR: 66 (15 Apr 2025 09:00) (61 - 86)  BP: 117/39 (15 Apr 2025 08:00) (103/43 - 124/49)  BP(mean): 62 (15 Apr 2025 08:00) (60 - 87)  RR: 17 (15 Apr 2025 09:00) (17 - 29)  SpO2: 95% (15 Apr 2025 09:00) (94% - 100%)    Parameters below as of 14 Apr 2025 18:00  Patient On (Oxygen Delivery Method): room air      Daily     Daily     PPSV2:  20%  FAST:    General: alert, weak-appearing, in NAD  Mental Status: A&Ox2  HEENT: b/l temporal wasting  Lungs: cta b/l; poor inspiratory effort  Cardiac: S1S2  GI: abdomen soft, nontender with active bs x4  : no suprapubic tenderness  Ext: no edema  Neuro: A&Ox2.         LABS and RADIOLOGY: REVIEWED
Interval History:      off  pressors no complaints      on zosyn       no complaints    HPI:       89 y/o F w/ PMH of PE, DVT, HTN, dyslipidemia, colonc ancer s/p partial colectomy w/ colostomy, ovarian Ca s/p ORA, glaucoma Brought in by EMS for decreased mental status, decreased oral intake, dark urine progressively worsening over the last few days according to family. She is usually A+Ox2 per family. In the ER found to hypothermic, hypotensive, bradycardic. Found to have +UA, give 3L IVF and started on vasopressors.   urine culture with 3 organisms  Patient off pressors more awake, no complaints      ROS no chest pain, no sob, no abdominal pain, eating      PMH:       above     MEDICATIONS  (STANDING):  chlorhexidine 2% Cloths 1 Application(s) Topical <User Schedule>  midodrine 10 milliGRAM(s) Oral every 8 hours  pantoprazole  Injectable 40 milliGRAM(s) IV Push daily  piperacillin/tazobactam IVPB.. 3.375 Gram(s) IV Intermittent every 12 hours    MEDICATIONS  (PRN):  FIRST- Mouthwash  BLM 5 milliLiter(s) Swish and Spit every 6 hours PRN Mouth Care    ICU Vital Signs Last 24 Hrs  T(C): 36.2 (14 Apr 2025 12:00), Max: 36.5 (13 Apr 2025 21:00)  T(F): 97.2 (14 Apr 2025 12:00), Max: 97.7 (13 Apr 2025 21:00)  HR: 81 (14 Apr 2025 12:00) (63 - 81)  BP: 109/43 (14 Apr 2025 12:00) (93/50 - 120/47)  BP(mean): 63 (14 Apr 2025 12:00) (54 - 71)  ABP: --  ABP(mean): --  RR: 23 (14 Apr 2025 12:00) (19 - 28)  SpO2: 100% (14 Apr 2025 12:00) (94% - 100%)    O2 Parameters below as of 14 Apr 2025 10:00  Patient On (Oxygen Delivery Method): room air    Physical Exam    General - no distress, frail  HEENT - nc/at  neck supple  lungs clear  cv rrr  abdomen soft, s/p colostomy   voiding  extremity warm  neuro non  focal weak    I&O's Summary    13 Apr 2025 07:01  -  14 Apr 2025 07:00  --------------------------------------------------------  IN: 100 mL / OUT: 1200 mL / NET: -1100 mL                        8.4    7.74  )-----------( 124      ( 14 Apr 2025 05:31 )             25.1       04-14    149[H]  |  122[H]  |  42[H]  ----------------------------<  107[H]  4.0   |  21[L]  |  1.12    Ca    9.1      14 Apr 2025 05:31  Phos  3.0     04-14  Mg     1.9     04-14        Urinalysis Basic - ( 14 Apr 2025 05:31 )    Color: x / Appearance: x / SG: x / pH: x  Gluc: 107 mg/dL / Ketone: x  / Bili: x / Urobili: x   Blood: x / Protein: x / Nitrite: x   Leuk Esterase: x / RBC: x / WBC x   Sq Epi: x / Non Sq Epi: x / Bacteria: x    I personally reviewed the CXR:    DVT Prophylaxis:  eliquis                                                               Advanced Directives: dnr/dni     Sepsis Criteria Met - no    Labs, Notes, Orders, radiologic studies reviewed and care coordinated with multidisciplinary team              
Patient is a 89y old  Female who presents with a chief complaint of Urosepsis, septic shock (11 Apr 2025 10:00)      BRIEF HOSPITAL COURSE:  per HPI:   89 y/o F w/ PMH of PE, DVT, HTN, dyslipidemia, colonc ancer s/p partial colectomy w/ colostomy, ovarian Ca s/p ORA, glaucoma Brought in by EMS for decreased mental status, decreased oral intake, dark urine progressively worsening over the last few days according to family. She is usually A+Ox2 per family. In the ER found to hypothermic, hypotensive, bradycardic. Found to have +UA, give 3L IVF and started on vasopressors.     Unable to obtain history from patient due to clinical condition.       Interval history:   4/11 - still on levo 0.03 mcg, passed SLP  AAOx2     PAST MEDICAL & SURGICAL HISTORY:  Pulmonary embolism        Allergies    No Known Drug Allergies  Originally Entered as [Rash] reaction to [adhesive tape] (Unknown)    Intolerances      FAMILY HISTORY:      Family history otherwise noncontributory.    Social History: ***    Review of Systems:  negative except as above     ALL OTHER REVIEW OF SYSTEMS EXCEPT PER HPI NEGATIVE.    MEDICATIONS  (STANDING):  chlorhexidine 2% Cloths 1 Application(s) Topical <User Schedule>  heparin  Infusion.  Unit(s)/Hr (11 mL/Hr) IV Continuous <Continuous>  midodrine 10 milliGRAM(s) Oral every 8 hours  norepinephrine Infusion 0.05 MICROgram(s)/kG/Min (6.02 mL/Hr) IV Continuous <Continuous>  pantoprazole  Injectable 40 milliGRAM(s) IV Push daily  piperacillin/tazobactam IVPB.. 3.375 Gram(s) IV Intermittent every 12 hours  sodium bicarbonate  Infusion 0.234 mEq/kG/Hr (100 mL/Hr) IV Continuous <Continuous>    MEDICATIONS  (PRN):  heparin   Injectable 5000 Unit(s) IV Push every 6 hours PRN For aPTT less than 40  heparin   Injectable 2500 Unit(s) IV Push every 6 hours PRN For aPTT between 40 - 57        ICU Vital Signs Last 24 Hrs  T(C): 36.1 (11 Apr 2025 14:00), Max: 36.1 (11 Apr 2025 13:00)  T(F): 97 (11 Apr 2025 14:00), Max: 97 (11 Apr 2025 13:00)  HR: 126 (11 Apr 2025 14:00) (70 - 132)  BP: 114/66 (11 Apr 2025 14:00) (86/61 - 134/90)  BP(mean): 81 (11 Apr 2025 14:00) (57 - 102)  ABP: --  ABP(mean): --  RR: 21 (11 Apr 2025 14:00) (16 - 22)  SpO2: 100% (11 Apr 2025 14:00) (91% - 100%)    O2 Parameters below as of 11 Apr 2025 10:00  Patient On (Oxygen Delivery Method): room air          ABG - ( 11 Apr 2025 10:34 )  pH, Arterial: 7.40  pH, Blood: x     /  pCO2: 32    /  pO2: 93    / HCO3: 20    / Base Excess: -4.0  /  SaO2: 99                  I&O's Detail    10 Apr 2025 07:01  -  11 Apr 2025 07:00  --------------------------------------------------------  IN:    IV PiggyBack: 100 mL    Norepinephrine: 92 mL    Sodium Bicarbonate: 1312 mL  Total IN: 1504 mL    OUT:    Indwelling Catheter - Urethral (mL): 750 mL  Total OUT: 750 mL    Total NET: 754 mL      11 Apr 2025 07:01  -  11 Apr 2025 15:52  --------------------------------------------------------  IN:    Heparin Infusion: 53 mL    IV PiggyBack: 300 mL    Sodium Bicarbonate: 700 mL  Total IN: 1053 mL    OUT:  Total OUT: 0 mL    Total NET: 1053 mL            LABS:                        11.4   12.57 )-----------( 203      ( 11 Apr 2025 04:37 )             34.0     04-11    142  |  115[H]  |  78[H]  ----------------------------<  206[H]  3.4[L]   |  17[L]  |  2.09[H]    Ca    8.8      11 Apr 2025 04:37  Phos  3.0     04-11  Mg     2.0     04-11    TPro  5.3[L]  /  Alb  2.1[L]  /  TBili  0.3  /  DBili  x   /  AST  20  /  ALT  36  /  AlkPhos  90  04-11          CAPILLARY BLOOD GLUCOSE      POCT Blood Glucose.: 140 mg/dL (10 Apr 2025 11:06)    PT/INR - ( 10 Apr 2025 18:48 )   PT: 21.8 sec;   INR: 1.91 ratio         PTT - ( 10 Apr 2025 18:48 )  PTT:31.3 sec  Urinalysis Basic - ( 11 Apr 2025 04:37 )    Color: x / Appearance: x / SG: x / pH: x  Gluc: 206 mg/dL / Ketone: x  / Bili: x / Urobili: x   Blood: x / Protein: x / Nitrite: x   Leuk Esterase: x / RBC: x / WBC x   Sq Epi: x / Non Sq Epi: x / Bacteria: x      CULTURES:      Physical Examination:  GENERAL: In NAD   HEENT: NC/AT  NECK: Supple, trachea midline  PULM: CTA b/l   CVS: +S1, S2, RRR  ABD: Soft, non-tender  EXTREMITIES: No pedal edema B/L  SKIN: No open wounds  NEURO: Grossly non-focal    DEVICES:     RADIOLOGY: reviewed   
HPI: 89 y/o F w/ PMH of PE, DVT, HTN, dyslipidemia, colonc ancer s/p partial colectomy w/ colostomy, ovarian Ca s/p ORA, glaucoma Brought in by EMS for decreased mental status, decreased oral intake, dark urine progressively worsening over the last few days according to family. She is usually A+Ox2 per family. In the ER found to hypothermic, hypotensive, bradycardic. Found to have +UA, give 3L IVF and started on vasopressors.     Patient is now off pressors more awake, no complaints. Transferred to medical floor.    Gen: No fever, chills, weakness  ENT: No visual changes or throat pain  Neck: No pain or stiffness  Respiratory: No cough or wheezing  Cardiovascular: No chest pain or palpitations  Gastrointestinal: No abdominal pain, nausea, vomiting, constipation, or diarrhea  Hematologic: No easy bleeding or bruising  Neurologic: No numbness or focal weakness  Psych: No depression or insomnia  Skin: No rash or itching      MEDICATIONS  (STANDING):  apixaban 2.5 milliGRAM(s) Oral two times a day  atorvastatin 20 milliGRAM(s) Oral at bedtime  chlorhexidine 2% Cloths 1 Application(s) Topical <User Schedule>  midodrine 10 milliGRAM(s) Oral every 8 hours  pantoprazole  Injectable 40 milliGRAM(s) IV Push daily  piperacillin/tazobactam IVPB.. 3.375 Gram(s) IV Intermittent every 12 hours    MEDICATIONS  (PRN):  FIRST- Mouthwash  BLM 5 milliLiter(s) Swish and Spit every 6 hours PRN Mouth Care      Vital Signs Last 24 Hrs  T(C): 36.1 (14 Apr 2025 15:00), Max: 36.5 (13 Apr 2025 21:00)  T(F): 97 (14 Apr 2025 15:00), Max: 97.7 (13 Apr 2025 21:00)  HR: 69 (14 Apr 2025 15:00) (63 - 86)  BP: 114/60 (14 Apr 2025 15:00) (93/50 - 120/47)  BP(mean): 75 (14 Apr 2025 15:00) (56 - 75)  RR: 29 (14 Apr 2025 15:00) (19 - 29)  SpO2: 100% (14 Apr 2025 15:00) (94% - 100%)    Parameters below as of 14 Apr 2025 14:00  Patient On (Oxygen Delivery Method): room air    General - no distress, frail  HEENT - nc/at  neck supple  lungs clear  cv rrr  abdomen soft, s/p colostomy   voiding  extremity warm  neuro non  focal weak        LABS:                          8.4    7.74  )-----------( 124      ( 14 Apr 2025 05:31 )             25.1     14 Apr 2025 05:31    149    |  122    |  42     ----------------------------<  107    4.0     |  21     |  1.12     Ca    9.1        14 Apr 2025 05:31  Phos  3.0       14 Apr 2025 05:31  Mg     1.9       14 Apr 2025 05:31          CAPILLARY BLOOD GLUCOSE            Urinalysis Basic - ( 14 Apr 2025 05:31 )    Color: x / Appearance: x / SG: x / pH: x  Gluc: 107 mg/dL / Ketone: x  / Bili: x / Urobili: x   Blood: x / Protein: x / Nitrite: x   Leuk Esterase: x / RBC: x / WBC x   Sq Epi: x / Non Sq Epi: x / Bacteria: x        RADIOLOGY:        
Patient is a 89y old  Female who presents with a chief complaint of Urosepsis, septic shock (12 Apr 2025 10:03)    Allergies    No Known Drug Allergies  Originally Entered as [Rash] reaction to [adhesive tape] (Unknown)    Intolerances      REVIEW OF SYSTEMS: SEE BELOW       ICU Vital Signs Last 24 Hrs  T(C): 35.5 (13 Apr 2025 09:00), Max: 36.5 (12 Apr 2025 22:00)  T(F): 95.9 (13 Apr 2025 09:00), Max: 97.7 (12 Apr 2025 22:00)  HR: 63 (13 Apr 2025 09:00) (63 - 110)  BP: 102/37 (13 Apr 2025 09:00) (80/69 - 125/52)  BP(mean): 54 (13 Apr 2025 09:00) (54 - 76)  ABP: --  ABP(mean): --  RR: 22 (13 Apr 2025 09:00) (19 - 26)  SpO2: 97% (13 Apr 2025 09:00) (95% - 100%)    O2 Parameters below as of 12 Apr 2025 16:00  Patient On (Oxygen Delivery Method): room air            CAPILLARY BLOOD GLUCOSE          I&O's Summary    12 Apr 2025 07:01  -  13 Apr 2025 07:00  --------------------------------------------------------  IN: 749.2 mL / OUT: 1600 mL / NET: -850.8 mL            MEDICATIONS  (STANDING):  chlorhexidine 2% Cloths 1 Application(s) Topical <User Schedule>  midodrine 10 milliGRAM(s) Oral every 8 hours  pantoprazole  Injectable 40 milliGRAM(s) IV Push daily  piperacillin/tazobactam IVPB.. 3.375 Gram(s) IV Intermittent every 12 hours  potassium chloride   Powder 40 milliEquivalent(s) Oral once  potassium phosphate IVPB 30 milliMole(s) IV Intermittent once      MEDICATIONS  (PRN):  FIRST- Mouthwash  BLM 5 milliLiter(s) Swish and Spit every 6 hours PRN Mouth Care      PHYSICAL EXAM: SEE BELOW                          7.6    5.77  )-----------( 135      ( 13 Apr 2025 06:02 )             22.8       04-13    146[H]  |  117[H]  |  57[H]  ----------------------------<  96  3.2[L]   |  23  |  1.09    Ca    9.5      13 Apr 2025 06:02  Phos  1.6     04-13  Mg     2.1     04-13    TPro  4.6[L]  /  Alb  1.8[L]  /  TBili  0.3  /  DBili  x   /  AST  23  /  ALT  28  /  AlkPhos  79  04-12          PTT - ( 12 Apr 2025 08:24 )  PTT:134.1 sec  Urinalysis Basic - ( 13 Apr 2025 06:02 )    Color: x / Appearance: x / SG: x / pH: x  Gluc: 96 mg/dL / Ketone: x  / Bili: x / Urobili: x   Blood: x / Protein: x / Nitrite: x   Leuk Esterase: x / RBC: x / WBC x   Sq Epi: x / Non Sq Epi: x / Bacteria: x      Blood Blood-Peripheral   No growth at 48 Hours -- 04-10 @ 11:10

## 2025-04-15 NOTE — PROGRESS NOTE ADULT - TIME BILLING
- Reviewing, and interpreting labs and testing.  - Independently obtaining a review of systems and performing a physical exam  - Reviewing consultant documentation/recommendations in addition to discussing plan of care with consultants.  - Counselling and educating patient and family regarding interpretation of aforementioned items and plan of care.
Patient seen labs reviewed d/w staff on rounds
- Reviewing, and interpreting labs and testing.  - Independently obtaining a review of systems and performing a physical exam  - Reviewing consultant documentation/recommendations in addition to discussing plan of care with consultants.  - Counselling and educating patient and family regarding interpretation of aforementioned items and plan of care.
.

## 2025-04-16 ENCOUNTER — TRANSCRIPTION ENCOUNTER (OUTPATIENT)
Age: 89
End: 2025-04-16

## 2025-04-16 VITALS — OXYGEN SATURATION: 95 % | RESPIRATION RATE: 15 BRPM | HEART RATE: 69 BPM

## 2025-04-16 PROCEDURE — 99239 HOSP IP/OBS DSCHRG MGMT >30: CPT

## 2025-04-16 RX ORDER — GLYCOPYRROLATE 0.2 MG/ML
0.4 INJECTION INTRAMUSCULAR; INTRAVENOUS
Refills: 0 | Status: DISCONTINUED | OUTPATIENT
Start: 2025-04-16 | End: 2025-04-16

## 2025-04-16 RX ORDER — GLYCOPYRROLATE 0.2 MG/ML
0.4 INJECTION INTRAMUSCULAR; INTRAVENOUS
Qty: 0 | Refills: 0 | DISCHARGE
Start: 2025-04-16

## 2025-04-16 RX ORDER — MIDODRINE HYDROCHLORIDE 5 MG/1
1 TABLET ORAL
Qty: 0 | Refills: 0 | DISCHARGE
Start: 2025-04-16

## 2025-04-16 RX ADMIN — Medication 40 MILLIGRAM(S): at 11:09

## 2025-04-16 RX ADMIN — Medication 2 MILLIGRAM(S): at 13:00

## 2025-04-16 NOTE — PROVIDER CONTACT NOTE (CHANGE IN STATUS NOTIFICATION) - SITUATION
IV lock placed prior to DC to out patient hospice.
Pt came in for urosepsis, hypotensive and hypothermic. Levo @0.05,

## 2025-04-16 NOTE — DISCHARGE NOTE PROVIDER - NSDCCPCAREPLAN_GEN_ALL_CORE_FT
PRINCIPAL DISCHARGE DIAGNOSIS  Diagnosis: Septic shock  Assessment and Plan of Treatment:       SECONDARY DISCHARGE DIAGNOSES  Diagnosis: Urinary tract infection  Assessment and Plan of Treatment:

## 2025-04-16 NOTE — DISCHARGE NOTE NURSING/CASE MANAGEMENT/SOCIAL WORK - NSDCPEFALRISK_GEN_ALL_CORE
For information on Fall & Injury Prevention, visit: [email or web address] OR  [email or web address] OR  https://www.cdc.gov/steadi/patient.html

## 2025-04-16 NOTE — DISCHARGE NOTE PROVIDER - DETAILS OF MALNUTRITION DIAGNOSIS/DIAGNOSES
This patient has been assessed with a concern for Malnutrition and was treated during this hospitalization for the following Nutrition diagnosis/diagnoses:     -  04/11/2025: Severe protein-calorie malnutrition

## 2025-04-16 NOTE — DISCHARGE NOTE NURSING/CASE MANAGEMENT/SOCIAL WORK - PATIENT PORTAL LINK FT
You can access the FollowMyHealth Patient Portal offered by [facility] by registering at the following website: [email or web address]. By joining [employer]’s FollowMyHealth portal, you will also be able to view your health information using other applications (apps) compatible with our system.

## 2025-04-16 NOTE — PROVIDER CONTACT NOTE (CHANGE IN STATUS NOTIFICATION) - ACTION/TREATMENT ORDERED:
BRUNILDA Mancilla assessed pt at bedside. No new orders, will continue to monitor patient
IVL placed for dc to hospice.

## 2025-04-16 NOTE — DISCHARGE NOTE PROVIDER - NSDCMRMEDTOKEN_GEN_ALL_CORE_FT
midodrine 10 mg oral tablet: 1 tab(s) orally every 8 hours  morphine: 2 milligram(s) intravenous every 4 hours as needed for moderate-severe pain and/or dyspnea for comfort care  pantoprazole 40 mg intravenous injection: 40 milligram(s) intravenous once a day  Rocklatan 0.02%-0.005% ophthalmic solution: 1 drop(s) in the right eye once a day (in the evening)   glycopyrrolate 0.2 mg/mL injectable solution: 0.4 milligram(s) injectable 4 times a day as needed for secretions  morphine: 2 milligram(s) intravenous every 4 hours as needed for moderate-severe pain and/or dyspnea for comfort care   glycopyrrolate 0.2 mg/mL injectable solution: 0.4 milligram(s) intravenous 4 times a day as needed for secretions  morphine: 2 milligram(s) intravenous every 4 hours as needed for moderate-severe pain and/or dyspnea for comfort care

## 2025-04-16 NOTE — DISCHARGE NOTE NURSING/CASE MANAGEMENT/SOCIAL WORK - FINANCIAL ASSISTANCE
[facility] provides services at a reduced cost to those who are determined to be eligible through [facility]’s financial assistance program. Information regarding [facility]’s financial assistance program can be found by going to [email or web address] or by calling [phone].

## 2025-04-16 NOTE — DISCHARGE NOTE PROVIDER - CARE PROVIDER_API CALL
Octavio Tello  Internal Medicine  N  ILIANA MORATAYA DO,    Phone: ([phone]  Fax: [phone]  Established Patient  Follow Up Time: 1-3 days

## 2025-04-16 NOTE — DISCHARGE NOTE PROVIDER - HOSPITAL COURSE
89 y/o F w/ PMH of PE, DVT, HTN, dyslipidemia, colon cancer s/p partial colectomy w/ colostomy, ovarian Ca s/p ORA, glaucoma Brought in by EMS for decreased mental status, decreased oral intake, dark urine progressively worsening over the last few days according to family. She is usually A+Ox2 per family. In the ER found to hypothermic, hypotensive, bradycardic. Found to have +UA, give 3L IVF and started on vasopressors.     Severe sepsis with septic shock (POA)   Acute cystitis   Prerenal JOEY   Metabolic acidosis   Acute metabolic encephalopathy   Worsening anemia  -family has decided to make patient hospice care at home, dc plan in progress with home hospice care   -off Levo, hold antihypertensives (was on metoprolol at home, probably can restart in day or 2  -completed Zosyn, BCx neg,   -TTE with nl EF, no pericardial effusion   -daily reorientation, mentation not improving   -hematuria resolved no active bleeding, recent CT without hemorrhage, abdominal exam benign   -po diet as tolerated     Patient is not clinically improving with her mental status. Family has decided to make her inpatient hospice care. Patient will dc to hospice care today.  Vital Signs Last 24 Hrs  T(C): 36.1 (15 Apr 2025 20:00), Max: 36.7 (15 Apr 2025 12:00)  T(F): 97 (15 Apr 2025 20:00), Max: 98.1 (15 Apr 2025 12:00)  HR: 71 (16 Apr 2025 08:00) (62 - 79)  BP: 95/71 (16 Apr 2025 08:00) (95/71 - 127/114)  BP(mean): 78 (16 Apr 2025 08:00) (67 - 120)  RR: 13 (16 Apr 2025 08:00) (13 - 22)  SpO2: 95% (16 Apr 2025 08:00) (93% - 96%)    General - no distress, frail  HEENT - nc/at  neck supple  lungs clear  cv rrr  abdomen soft, s/p colostomy   voiding  extremity warm  neuro non  focal weak         89 y/o F w/ PMH of PE, DVT, HTN, dyslipidemia, colon cancer s/p partial colectomy w/ colostomy, ovarian Ca s/p ORA, glaucoma Brought in by EMS for decreased mental status, decreased oral intake, dark urine progressively worsening over the last few days according to family. She is usually A+Ox2 per family. In the ER found to hypothermic, hypotensive, bradycardic. Found to have +UA, give 3L IVF and started on vasopressors.     Severe sepsis with septic shock (POA)   Acute cystitis   Prerenal JOEY   Metabolic acidosis   Acute metabolic encephalopathy   Worsening anemia  -family has decided to make patient hospice care at home, dc plan in progress with home hospice care   -off Levo, hold antihypertensives (was on metoprolol at home, probably can restart in day or 2  -completed Zosyn, BCx neg,   -TTE with nl EF, no pericardial effusion   -daily reorientation, mentation not improving   -hematuria resolved no active bleeding, recent CT without hemorrhage, abdominal exam benign   -po diet as tolerated     Patient is not clinically improving with her mental status. Family has decided to make her inpatient hospice care. Patient will dc to inpatient hospice care today.  Vital Signs Last 24 Hrs  T(C): 36.1 (15 Apr 2025 20:00), Max: 36.7 (15 Apr 2025 12:00)  T(F): 97 (15 Apr 2025 20:00), Max: 98.1 (15 Apr 2025 12:00)  HR: 71 (16 Apr 2025 08:00) (62 - 79)  BP: 95/71 (16 Apr 2025 08:00) (95/71 - 127/114)  BP(mean): 78 (16 Apr 2025 08:00) (67 - 120)  RR: 13 (16 Apr 2025 08:00) (13 - 22)  SpO2: 95% (16 Apr 2025 08:00) (93% - 96%)    General - no distress, frail  HEENT - nc/at  neck supple  lungs clear  cv rrr  abdomen soft, s/p colostomy   voiding  extremity warm  neuro non  focal weak

## 2025-04-17 ENCOUNTER — TRANSCRIPTION ENCOUNTER (OUTPATIENT)
Age: 89
End: 2025-04-17

## 2025-04-21 DIAGNOSIS — I10 ESSENTIAL (PRIMARY) HYPERTENSION: ICD-10-CM

## 2025-04-21 DIAGNOSIS — R65.21 SEVERE SEPSIS WITH SEPTIC SHOCK: ICD-10-CM

## 2025-04-21 DIAGNOSIS — Z66 DO NOT RESUSCITATE: ICD-10-CM

## 2025-04-21 DIAGNOSIS — Z85.43 PERSONAL HISTORY OF MALIGNANT NEOPLASM OF OVARY: ICD-10-CM

## 2025-04-21 DIAGNOSIS — A41.9 SEPSIS, UNSPECIFIED ORGANISM: ICD-10-CM

## 2025-04-21 DIAGNOSIS — E78.5 HYPERLIPIDEMIA, UNSPECIFIED: ICD-10-CM

## 2025-04-21 DIAGNOSIS — Z86.711 PERSONAL HISTORY OF PULMONARY EMBOLISM: ICD-10-CM

## 2025-04-21 DIAGNOSIS — H40.9 UNSPECIFIED GLAUCOMA: ICD-10-CM

## 2025-04-21 DIAGNOSIS — G93.41 METABOLIC ENCEPHALOPATHY: ICD-10-CM

## 2025-04-21 DIAGNOSIS — Z86.718 PERSONAL HISTORY OF OTHER VENOUS THROMBOSIS AND EMBOLISM: ICD-10-CM

## 2025-04-21 DIAGNOSIS — Z51.5 ENCOUNTER FOR PALLIATIVE CARE: ICD-10-CM

## 2025-04-21 DIAGNOSIS — J18.9 PNEUMONIA, UNSPECIFIED ORGANISM: ICD-10-CM

## 2025-04-21 DIAGNOSIS — Z85.038 PERSONAL HISTORY OF OTHER MALIGNANT NEOPLASM OF LARGE INTESTINE: ICD-10-CM

## 2025-04-21 DIAGNOSIS — R31.9 HEMATURIA, UNSPECIFIED: ICD-10-CM

## 2025-04-21 DIAGNOSIS — N30.00 ACUTE CYSTITIS WITHOUT HEMATURIA: ICD-10-CM

## 2025-04-21 DIAGNOSIS — E43 UNSPECIFIED SEVERE PROTEIN-CALORIE MALNUTRITION: ICD-10-CM

## 2025-04-21 DIAGNOSIS — Z74.01 BED CONFINEMENT STATUS: ICD-10-CM

## 2025-04-21 DIAGNOSIS — N17.9 ACUTE KIDNEY FAILURE, UNSPECIFIED: ICD-10-CM

## 2025-04-21 DIAGNOSIS — Z79.01 LONG TERM (CURRENT) USE OF ANTICOAGULANTS: ICD-10-CM

## 2025-04-21 DIAGNOSIS — E87.20 ACIDOSIS, UNSPECIFIED: ICD-10-CM

## 2025-04-21 DIAGNOSIS — Z79.82 LONG TERM (CURRENT) USE OF ASPIRIN: ICD-10-CM
